# Patient Record
Sex: FEMALE | Race: WHITE | Employment: OTHER | URBAN - METROPOLITAN AREA
[De-identification: names, ages, dates, MRNs, and addresses within clinical notes are randomized per-mention and may not be internally consistent; named-entity substitution may affect disease eponyms.]

---

## 2017-05-26 ENCOUNTER — APPOINTMENT (OUTPATIENT)
Dept: GENERAL RADIOLOGY | Age: 77
DRG: 641 | End: 2017-05-26
Payer: MEDICARE

## 2017-05-26 ENCOUNTER — HOSPITAL ENCOUNTER (INPATIENT)
Age: 77
LOS: 3 days | Discharge: HOME HEALTH | DRG: 641 | End: 2017-05-29
Admitting: FAMILY MEDICINE
Payer: MEDICARE

## 2017-05-26 DIAGNOSIS — E86.0 DEHYDRATION: ICD-10-CM

## 2017-05-26 DIAGNOSIS — E87.6 HYPOKALEMIA: Primary | ICD-10-CM

## 2017-05-26 DIAGNOSIS — R53.81 OTHER MALAISE AND FATIGUE: ICD-10-CM

## 2017-05-26 DIAGNOSIS — R53.83 OTHER MALAISE AND FATIGUE: ICD-10-CM

## 2017-05-26 LAB
-: ABNORMAL
ABSOLUTE EOS #: 0 K/UL (ref 0–0.4)
ABSOLUTE LYMPH #: 0.7 K/UL (ref 1–4.8)
ABSOLUTE MONO #: 0.2 K/UL (ref 0.2–0.8)
ALBUMIN SERPL-MCNC: 4.3 G/DL (ref 3.5–5.2)
ALBUMIN/GLOBULIN RATIO: ABNORMAL (ref 1–2.5)
ALP BLD-CCNC: 86 U/L (ref 35–104)
ALT SERPL-CCNC: 31 U/L (ref 5–33)
AMORPHOUS: ABNORMAL
AMYLASE: 81 U/L (ref 28–100)
ANION GAP SERPL CALCULATED.3IONS-SCNC: 24 MMOL/L (ref 9–17)
ANION GAP SERPL CALCULATED.3IONS-SCNC: 24 MMOL/L (ref 9–17)
AST SERPL-CCNC: 33 U/L
BACTERIA: ABNORMAL
BASOPHILS # BLD: 0 %
BASOPHILS ABSOLUTE: 0 K/UL (ref 0–0.2)
BILIRUB SERPL-MCNC: 1.02 MG/DL (ref 0.3–1.2)
BILIRUBIN DIRECT: 0.26 MG/DL
BILIRUBIN URINE: ABNORMAL
BILIRUBIN, INDIRECT: 0.76 MG/DL (ref 0–1)
BUN BLDV-MCNC: 15 MG/DL (ref 8–23)
BUN BLDV-MCNC: 16 MG/DL (ref 8–23)
BUN/CREAT BLD: 21 (ref 9–20)
BUN/CREAT BLD: 23 (ref 9–20)
CALCIUM SERPL-MCNC: 9.2 MG/DL (ref 8.6–10.4)
CALCIUM SERPL-MCNC: 9.7 MG/DL (ref 8.6–10.4)
CASTS UA: ABNORMAL /LPF
CHLORIDE BLD-SCNC: 90 MMOL/L (ref 98–107)
CHLORIDE BLD-SCNC: 91 MMOL/L (ref 98–107)
CO2: 24 MMOL/L (ref 20–31)
CO2: 26 MMOL/L (ref 20–31)
COLOR: YELLOW
COMMENT UA: ABNORMAL
CREAT SERPL-MCNC: 0.66 MG/DL (ref 0.5–0.9)
CREAT SERPL-MCNC: 0.78 MG/DL (ref 0.5–0.9)
CRYSTALS, UA: ABNORMAL /HPF
DIFFERENTIAL TYPE: ABNORMAL
EOSINOPHILS RELATIVE PERCENT: 0 %
EPITHELIAL CELLS UA: ABNORMAL /HPF
GFR AFRICAN AMERICAN: >60 ML/MIN
GFR AFRICAN AMERICAN: >60 ML/MIN
GFR NON-AFRICAN AMERICAN: >60 ML/MIN
GFR NON-AFRICAN AMERICAN: >60 ML/MIN
GFR SERPL CREATININE-BSD FRML MDRD: ABNORMAL ML/MIN/{1.73_M2}
GLOBULIN: ABNORMAL G/DL (ref 1.5–3.8)
GLUCOSE BLD-MCNC: 55 MG/DL (ref 70–99)
GLUCOSE BLD-MCNC: 85 MG/DL (ref 70–99)
GLUCOSE URINE: NEGATIVE
HCT VFR BLD CALC: 53.6 % (ref 36–46)
HEMOGLOBIN: 18 G/DL (ref 12–16)
KETONES, URINE: ABNORMAL
LACTIC ACID, WHOLE BLOOD: ABNORMAL MMOL/L (ref 0.7–2.1)
LACTIC ACID: 2.9 MMOL/L (ref 0.5–2.2)
LEUKOCYTE ESTERASE, URINE: NEGATIVE
LIPASE: 50 U/L (ref 13–60)
LYMPHOCYTES # BLD: 8 %
MCH RBC QN AUTO: 29 PG (ref 26–34)
MCHC RBC AUTO-ENTMCNC: 33.6 G/DL (ref 31–37)
MCV RBC AUTO: 86.5 FL (ref 80–100)
MONOCYTES # BLD: 3 %
MUCUS: ABNORMAL
MYOGLOBIN: 180 NG/ML (ref 25–58)
NITRITE, URINE: NEGATIVE
OTHER OBSERVATIONS UA: ABNORMAL
PDW BLD-RTO: 13.6 % (ref 11.5–14.5)
PH UA: 6 (ref 5–8)
PLATELET # BLD: 148 K/UL (ref 130–400)
PLATELET ESTIMATE: ABNORMAL
PMV BLD AUTO: 9.9 FL (ref 6–12)
POTASSIUM SERPL-SCNC: 2.6 MMOL/L (ref 3.7–5.3)
POTASSIUM SERPL-SCNC: 3.1 MMOL/L (ref 3.7–5.3)
PROTEIN UA: ABNORMAL
RBC # BLD: 6.2 M/UL (ref 4–5.2)
RBC # BLD: ABNORMAL 10*6/UL
RBC UA: ABNORMAL /HPF (ref 0–2)
RENAL EPITHELIAL, UA: ABNORMAL /HPF
SEG NEUTROPHILS: 89 %
SEGMENTED NEUTROPHILS ABSOLUTE COUNT: 7.9 K/UL (ref 1.8–7.7)
SODIUM BLD-SCNC: 138 MMOL/L (ref 135–144)
SODIUM BLD-SCNC: 141 MMOL/L (ref 135–144)
SPECIFIC GRAVITY UA: 1.01 (ref 1–1.03)
TOTAL PROTEIN: 8 G/DL (ref 6.4–8.3)
TRICHOMONAS: ABNORMAL
TROPONIN INTERP: ABNORMAL
TROPONIN T: <0.03 NG/ML
TURBIDITY: CLEAR
URINE HGB: NEGATIVE
UROBILINOGEN, URINE: NORMAL
WBC # BLD: 8.9 K/UL (ref 3.5–11)
WBC # BLD: ABNORMAL 10*3/UL
WBC UA: ABNORMAL /HPF (ref 0–5)
YEAST: ABNORMAL

## 2017-05-26 PROCEDURE — 84484 ASSAY OF TROPONIN QUANT: CPT

## 2017-05-26 PROCEDURE — 71020 XR CHEST STANDARD TWO VW: CPT

## 2017-05-26 PROCEDURE — 83605 ASSAY OF LACTIC ACID: CPT

## 2017-05-26 PROCEDURE — 99285 EMERGENCY DEPT VISIT HI MDM: CPT

## 2017-05-26 PROCEDURE — 85025 COMPLETE CBC W/AUTO DIFF WBC: CPT

## 2017-05-26 PROCEDURE — 72100 X-RAY EXAM L-S SPINE 2/3 VWS: CPT

## 2017-05-26 PROCEDURE — 6360000002 HC RX W HCPCS: Performed by: NURSE PRACTITIONER

## 2017-05-26 PROCEDURE — 6370000000 HC RX 637 (ALT 250 FOR IP): Performed by: NURSE PRACTITIONER

## 2017-05-26 PROCEDURE — 82150 ASSAY OF AMYLASE: CPT

## 2017-05-26 PROCEDURE — 1200000000 HC SEMI PRIVATE

## 2017-05-26 PROCEDURE — 80048 BASIC METABOLIC PNL TOTAL CA: CPT

## 2017-05-26 PROCEDURE — 87040 BLOOD CULTURE FOR BACTERIA: CPT

## 2017-05-26 PROCEDURE — 83690 ASSAY OF LIPASE: CPT

## 2017-05-26 PROCEDURE — 72220 X-RAY EXAM SACRUM TAILBONE: CPT

## 2017-05-26 PROCEDURE — 80076 HEPATIC FUNCTION PANEL: CPT

## 2017-05-26 PROCEDURE — 6360000002 HC RX W HCPCS

## 2017-05-26 PROCEDURE — 73630 X-RAY EXAM OF FOOT: CPT

## 2017-05-26 PROCEDURE — 83874 ASSAY OF MYOGLOBIN: CPT

## 2017-05-26 PROCEDURE — 87086 URINE CULTURE/COLONY COUNT: CPT

## 2017-05-26 PROCEDURE — 90471 IMMUNIZATION ADMIN: CPT | Performed by: NURSE PRACTITIONER

## 2017-05-26 PROCEDURE — 81001 URINALYSIS AUTO W/SCOPE: CPT

## 2017-05-26 PROCEDURE — 90715 TDAP VACCINE 7 YRS/> IM: CPT | Performed by: NURSE PRACTITIONER

## 2017-05-26 PROCEDURE — 87205 SMEAR GRAM STAIN: CPT

## 2017-05-26 PROCEDURE — 93005 ELECTROCARDIOGRAM TRACING: CPT

## 2017-05-26 RX ORDER — DIPYRIDAMOLE 25 MG
25 TABLET ORAL 4 TIMES DAILY
COMMUNITY
End: 2019-02-13

## 2017-05-26 RX ORDER — ASPIRIN AND DIPYRIDAMOLE 25; 200 MG/1; MG/1
1 CAPSULE, EXTENDED RELEASE ORAL 2 TIMES DAILY
COMMUNITY
End: 2019-02-13

## 2017-05-26 RX ORDER — SODIUM CHLORIDE 0.9 % (FLUSH) 0.9 %
10 SYRINGE (ML) INJECTION PRN
Status: DISCONTINUED | OUTPATIENT
Start: 2017-05-26 | End: 2017-05-27 | Stop reason: SDUPTHER

## 2017-05-26 RX ORDER — LISINOPRIL 20 MG/1
20 TABLET ORAL DAILY
COMMUNITY

## 2017-05-26 RX ORDER — POT CHLORIDE/POT BICARB/CIT AC 25 MEQ
50 TABLET, EFFERVESCENT ORAL DAILY
Status: DISCONTINUED | OUTPATIENT
Start: 2017-05-26 | End: 2017-05-29 | Stop reason: HOSPADM

## 2017-05-26 RX ORDER — DONEPEZIL HYDROCHLORIDE 10 MG/1
10 TABLET, FILM COATED ORAL NIGHTLY
COMMUNITY

## 2017-05-26 RX ORDER — ACETAMINOPHEN 325 MG/1
650 TABLET ORAL EVERY 4 HOURS PRN
Status: DISCONTINUED | OUTPATIENT
Start: 2017-05-26 | End: 2017-05-27 | Stop reason: SDUPTHER

## 2017-05-26 RX ORDER — ATORVASTATIN CALCIUM 20 MG/1
20 TABLET, FILM COATED ORAL DAILY
COMMUNITY

## 2017-05-26 RX ORDER — GLIPIZIDE 5 MG/1
5-10 TABLET ORAL SEE ADMIN INSTRUCTIONS
COMMUNITY

## 2017-05-26 RX ORDER — SODIUM CHLORIDE 0.9 % (FLUSH) 0.9 %
10 SYRINGE (ML) INJECTION EVERY 12 HOURS SCHEDULED
Status: DISCONTINUED | OUTPATIENT
Start: 2017-05-27 | End: 2017-05-27 | Stop reason: SDUPTHER

## 2017-05-26 RX ORDER — POTASSIUM CHLORIDE 7.45 MG/ML
10 INJECTION INTRAVENOUS PRN
Status: DISCONTINUED | OUTPATIENT
Start: 2017-05-26 | End: 2017-05-27 | Stop reason: SDUPTHER

## 2017-05-26 RX ADMIN — Medication 50 MEQ: at 20:41

## 2017-05-26 RX ADMIN — TETANUS TOXOID, REDUCED DIPHTHERIA TOXOID AND ACELLULAR PERTUSSIS VACCINE, ADSORBED 0.5 ML: 5; 2.5; 8; 8; 2.5 SUSPENSION INTRAMUSCULAR at 19:06

## 2017-05-26 RX ADMIN — POTASSIUM CHLORIDE 10 MEQ: 7.46 INJECTION, SOLUTION INTRAVENOUS at 22:33

## 2017-05-26 ASSESSMENT — PAIN DESCRIPTION - ONSET: ONSET: ON-GOING

## 2017-05-26 ASSESSMENT — PAIN DESCRIPTION - LOCATION: LOCATION: FOOT

## 2017-05-26 ASSESSMENT — ENCOUNTER SYMPTOMS
ABDOMINAL PAIN: 0
VOMITING: 0
BACK PAIN: 1
SHORTNESS OF BREATH: 0
NAUSEA: 0

## 2017-05-26 ASSESSMENT — PAIN SCALES - GENERAL: PAINLEVEL_OUTOF10: 9

## 2017-05-26 ASSESSMENT — PAIN DESCRIPTION - PROGRESSION: CLINICAL_PROGRESSION: NOT CHANGED

## 2017-05-26 ASSESSMENT — PAIN DESCRIPTION - PAIN TYPE: TYPE: ACUTE PAIN

## 2017-05-26 ASSESSMENT — PAIN DESCRIPTION - ORIENTATION: ORIENTATION: LEFT

## 2017-05-26 ASSESSMENT — PAIN DESCRIPTION - FREQUENCY: FREQUENCY: CONTINUOUS

## 2017-05-27 PROBLEM — F02.80 ALZHEIMER'S DISEASE (HCC): Status: ACTIVE | Noted: 2017-05-27

## 2017-05-27 PROBLEM — E87.6 HYPOKALEMIA: Status: ACTIVE | Noted: 2017-05-27

## 2017-05-27 PROBLEM — E16.2 HYPOGLYCEMIA: Status: ACTIVE | Noted: 2017-05-27

## 2017-05-27 PROBLEM — G30.9 ALZHEIMER'S DISEASE (HCC): Status: ACTIVE | Noted: 2017-05-27

## 2017-05-27 LAB
ABSOLUTE EOS #: 0 K/UL (ref 0–0.4)
ABSOLUTE LYMPH #: 2 K/UL (ref 1–4.8)
ABSOLUTE MONO #: 0.5 K/UL (ref 0.2–0.8)
ANION GAP SERPL CALCULATED.3IONS-SCNC: 16 MMOL/L (ref 9–17)
ANION GAP SERPL CALCULATED.3IONS-SCNC: 20 MMOL/L (ref 9–17)
BASOPHILS # BLD: 0 %
BASOPHILS ABSOLUTE: 0 K/UL (ref 0–0.2)
BUN BLDV-MCNC: 11 MG/DL (ref 8–23)
BUN BLDV-MCNC: 13 MG/DL (ref 8–23)
BUN/CREAT BLD: 20 (ref 9–20)
BUN/CREAT BLD: 23 (ref 9–20)
CALCIUM SERPL-MCNC: 8.8 MG/DL (ref 8.6–10.4)
CALCIUM SERPL-MCNC: 8.9 MG/DL (ref 8.6–10.4)
CHLORIDE BLD-SCNC: 92 MMOL/L (ref 98–107)
CHLORIDE BLD-SCNC: 98 MMOL/L (ref 98–107)
CO2: 26 MMOL/L (ref 20–31)
CO2: 26 MMOL/L (ref 20–31)
CREAT SERPL-MCNC: 0.56 MG/DL (ref 0.5–0.9)
CREAT SERPL-MCNC: 0.56 MG/DL (ref 0.5–0.9)
CULTURE: NO GROWTH
CULTURE: NORMAL
DIFFERENTIAL TYPE: ABNORMAL
EKG ATRIAL RATE: 95 BPM
EKG P AXIS: 8 DEGREES
EKG P-R INTERVAL: 168 MS
EKG Q-T INTERVAL: 452 MS
EKG QRS DURATION: 86 MS
EKG QTC CALCULATION (BAZETT): 568 MS
EKG R AXIS: -27 DEGREES
EKG T AXIS: 57 DEGREES
EKG VENTRICULAR RATE: 95 BPM
EOSINOPHILS RELATIVE PERCENT: 1 %
GFR AFRICAN AMERICAN: >60 ML/MIN
GFR AFRICAN AMERICAN: >60 ML/MIN
GFR NON-AFRICAN AMERICAN: >60 ML/MIN
GFR NON-AFRICAN AMERICAN: >60 ML/MIN
GFR SERPL CREATININE-BSD FRML MDRD: ABNORMAL ML/MIN/{1.73_M2}
GLUCOSE BLD-MCNC: 108 MG/DL (ref 70–99)
GLUCOSE BLD-MCNC: 123 MG/DL (ref 65–105)
GLUCOSE BLD-MCNC: 168 MG/DL (ref 70–99)
GLUCOSE BLD-MCNC: 182 MG/DL (ref 65–105)
GLUCOSE BLD-MCNC: 202 MG/DL (ref 65–105)
GLUCOSE BLD-MCNC: 252 MG/DL (ref 65–105)
GLUCOSE BLD-MCNC: 275 MG/DL (ref 65–105)
GLUCOSE BLD-MCNC: 33 MG/DL (ref 65–105)
HCT VFR BLD CALC: 47.5 % (ref 36–46)
HEMOGLOBIN: 16 G/DL (ref 12–16)
LYMPHOCYTES # BLD: 24 %
Lab: NORMAL
MCH RBC QN AUTO: 29.3 PG (ref 26–34)
MCHC RBC AUTO-ENTMCNC: 33.6 G/DL (ref 31–37)
MCV RBC AUTO: 87.1 FL (ref 80–100)
MONOCYTES # BLD: 6 %
PDW BLD-RTO: 13.4 % (ref 11.5–14.5)
PLATELET # BLD: 140 K/UL (ref 130–400)
PLATELET ESTIMATE: ABNORMAL
PMV BLD AUTO: 10.4 FL (ref 6–12)
POTASSIUM SERPL-SCNC: 2.8 MMOL/L (ref 3.7–5.3)
POTASSIUM SERPL-SCNC: 3.2 MMOL/L (ref 3.7–5.3)
POTASSIUM SERPL-SCNC: 3.7 MMOL/L (ref 3.7–5.3)
RBC # BLD: 5.46 M/UL (ref 4–5.2)
RBC # BLD: ABNORMAL 10*6/UL
SEG NEUTROPHILS: 69 %
SEGMENTED NEUTROPHILS ABSOLUTE COUNT: 5.5 K/UL (ref 1.8–7.7)
SODIUM BLD-SCNC: 138 MMOL/L (ref 135–144)
SODIUM BLD-SCNC: 140 MMOL/L (ref 135–144)
SPECIMEN DESCRIPTION: NORMAL
SPECIMEN DESCRIPTION: NORMAL
STATUS: NORMAL
WBC # BLD: 8 K/UL (ref 3.5–11)
WBC # BLD: ABNORMAL 10*3/UL

## 2017-05-27 PROCEDURE — 6370000000 HC RX 637 (ALT 250 FOR IP): Performed by: NURSE PRACTITIONER

## 2017-05-27 PROCEDURE — 94760 N-INVAS EAR/PLS OXIMETRY 1: CPT

## 2017-05-27 PROCEDURE — 80048 BASIC METABOLIC PNL TOTAL CA: CPT

## 2017-05-27 PROCEDURE — G8987 SELF CARE CURRENT STATUS: HCPCS

## 2017-05-27 PROCEDURE — 36415 COLL VENOUS BLD VENIPUNCTURE: CPT

## 2017-05-27 PROCEDURE — G8988 SELF CARE GOAL STATUS: HCPCS

## 2017-05-27 PROCEDURE — 1200000000 HC SEMI PRIVATE

## 2017-05-27 PROCEDURE — 85025 COMPLETE CBC W/AUTO DIFF WBC: CPT

## 2017-05-27 PROCEDURE — 97166 OT EVAL MOD COMPLEX 45 MIN: CPT

## 2017-05-27 PROCEDURE — 97116 GAIT TRAINING THERAPY: CPT

## 2017-05-27 PROCEDURE — 94150 VITAL CAPACITY TEST: CPT

## 2017-05-27 PROCEDURE — 97535 SELF CARE MNGMENT TRAINING: CPT

## 2017-05-27 PROCEDURE — 2580000003 HC RX 258: Performed by: FAMILY MEDICINE

## 2017-05-27 PROCEDURE — 82947 ASSAY GLUCOSE BLOOD QUANT: CPT

## 2017-05-27 PROCEDURE — 6360000002 HC RX W HCPCS: Performed by: FAMILY MEDICINE

## 2017-05-27 PROCEDURE — 84132 ASSAY OF SERUM POTASSIUM: CPT

## 2017-05-27 PROCEDURE — 97530 THERAPEUTIC ACTIVITIES: CPT

## 2017-05-27 PROCEDURE — 97162 PT EVAL MOD COMPLEX 30 MIN: CPT

## 2017-05-27 PROCEDURE — G8978 MOBILITY CURRENT STATUS: HCPCS

## 2017-05-27 PROCEDURE — 83036 HEMOGLOBIN GLYCOSYLATED A1C: CPT

## 2017-05-27 PROCEDURE — G8979 MOBILITY GOAL STATUS: HCPCS

## 2017-05-27 PROCEDURE — 6370000000 HC RX 637 (ALT 250 FOR IP): Performed by: FAMILY MEDICINE

## 2017-05-27 RX ORDER — DEXTROSE AND SODIUM CHLORIDE 5; .45 G/100ML; G/100ML
INJECTION, SOLUTION INTRAVENOUS CONTINUOUS
Status: DISCONTINUED | OUTPATIENT
Start: 2017-05-27 | End: 2017-05-27

## 2017-05-27 RX ORDER — ATORVASTATIN CALCIUM 20 MG/1
20 TABLET, FILM COATED ORAL DAILY
Status: DISCONTINUED | OUTPATIENT
Start: 2017-05-27 | End: 2017-05-29 | Stop reason: HOSPADM

## 2017-05-27 RX ORDER — DONEPEZIL HYDROCHLORIDE 10 MG/1
10 TABLET, FILM COATED ORAL NIGHTLY
Status: DISCONTINUED | OUTPATIENT
Start: 2017-05-27 | End: 2017-05-29 | Stop reason: HOSPADM

## 2017-05-27 RX ORDER — ACETAMINOPHEN 325 MG/1
650 TABLET ORAL EVERY 4 HOURS PRN
Status: DISCONTINUED | OUTPATIENT
Start: 2017-05-27 | End: 2017-05-29 | Stop reason: HOSPADM

## 2017-05-27 RX ORDER — SODIUM CHLORIDE 0.9 % (FLUSH) 0.9 %
10 SYRINGE (ML) INJECTION EVERY 12 HOURS SCHEDULED
Status: DISCONTINUED | OUTPATIENT
Start: 2017-05-27 | End: 2017-05-29 | Stop reason: HOSPADM

## 2017-05-27 RX ORDER — POTASSIUM CHLORIDE 7.45 MG/ML
10 INJECTION INTRAVENOUS PRN
Status: DISCONTINUED | OUTPATIENT
Start: 2017-05-27 | End: 2017-05-27 | Stop reason: ALTCHOICE

## 2017-05-27 RX ORDER — POTASSIUM CHLORIDE 20MEQ/15ML
40 LIQUID (ML) ORAL PRN
Status: DISCONTINUED | OUTPATIENT
Start: 2017-05-27 | End: 2017-05-27 | Stop reason: ALTCHOICE

## 2017-05-27 RX ORDER — DEXTROSE MONOHYDRATE 25 G/50ML
12.5 INJECTION, SOLUTION INTRAVENOUS PRN
Status: DISCONTINUED | OUTPATIENT
Start: 2017-05-27 | End: 2017-05-27 | Stop reason: SDUPTHER

## 2017-05-27 RX ORDER — POTASSIUM CHLORIDE 29.8 MG/ML
20 INJECTION INTRAVENOUS PRN
Status: DISCONTINUED | OUTPATIENT
Start: 2017-05-27 | End: 2017-05-27 | Stop reason: ALTCHOICE

## 2017-05-27 RX ORDER — NICOTINE POLACRILEX 4 MG
15 LOZENGE BUCCAL PRN
Status: DISCONTINUED | OUTPATIENT
Start: 2017-05-27 | End: 2017-05-29 | Stop reason: HOSPADM

## 2017-05-27 RX ORDER — GLIPIZIDE 5 MG/1
5 TABLET ORAL
Status: DISCONTINUED | OUTPATIENT
Start: 2017-05-27 | End: 2017-05-29 | Stop reason: HOSPADM

## 2017-05-27 RX ORDER — DIPYRIDAMOLE 25 MG
25 TABLET ORAL 4 TIMES DAILY
Status: DISCONTINUED | OUTPATIENT
Start: 2017-05-27 | End: 2017-05-29 | Stop reason: HOSPADM

## 2017-05-27 RX ORDER — LISINOPRIL 20 MG/1
20 TABLET ORAL DAILY
Status: DISCONTINUED | OUTPATIENT
Start: 2017-05-27 | End: 2017-05-29 | Stop reason: HOSPADM

## 2017-05-27 RX ORDER — POTASSIUM CHLORIDE 7.45 MG/ML
10 INJECTION INTRAVENOUS PRN
Status: DISCONTINUED | OUTPATIENT
Start: 2017-05-27 | End: 2017-05-29 | Stop reason: HOSPADM

## 2017-05-27 RX ORDER — ASPIRIN AND DIPYRIDAMOLE 25; 200 MG/1; MG/1
1 CAPSULE, EXTENDED RELEASE ORAL 2 TIMES DAILY
Status: DISCONTINUED | OUTPATIENT
Start: 2017-05-27 | End: 2017-05-29 | Stop reason: HOSPADM

## 2017-05-27 RX ORDER — DEXTROSE MONOHYDRATE 50 MG/ML
100 INJECTION, SOLUTION INTRAVENOUS PRN
Status: DISCONTINUED | OUTPATIENT
Start: 2017-05-27 | End: 2017-05-27 | Stop reason: SDUPTHER

## 2017-05-27 RX ORDER — DEXTROSE MONOHYDRATE 25 G/50ML
12.5 INJECTION, SOLUTION INTRAVENOUS PRN
Status: DISCONTINUED | OUTPATIENT
Start: 2017-05-27 | End: 2017-05-29 | Stop reason: HOSPADM

## 2017-05-27 RX ORDER — DEXTROSE MONOHYDRATE 50 MG/ML
100 INJECTION, SOLUTION INTRAVENOUS PRN
Status: DISCONTINUED | OUTPATIENT
Start: 2017-05-27 | End: 2017-05-29 | Stop reason: HOSPADM

## 2017-05-27 RX ORDER — ONDANSETRON 2 MG/ML
4 INJECTION INTRAMUSCULAR; INTRAVENOUS EVERY 6 HOURS PRN
Status: DISCONTINUED | OUTPATIENT
Start: 2017-05-27 | End: 2017-05-29 | Stop reason: HOSPADM

## 2017-05-27 RX ORDER — SODIUM CHLORIDE 0.9 % (FLUSH) 0.9 %
10 SYRINGE (ML) INJECTION PRN
Status: DISCONTINUED | OUTPATIENT
Start: 2017-05-27 | End: 2017-05-29 | Stop reason: HOSPADM

## 2017-05-27 RX ORDER — NICOTINE POLACRILEX 4 MG
15 LOZENGE BUCCAL PRN
Status: DISCONTINUED | OUTPATIENT
Start: 2017-05-27 | End: 2017-05-27 | Stop reason: SDUPTHER

## 2017-05-27 RX ORDER — POTASSIUM CHLORIDE 20 MEQ/1
40 TABLET, EXTENDED RELEASE ORAL PRN
Status: DISCONTINUED | OUTPATIENT
Start: 2017-05-27 | End: 2017-05-27 | Stop reason: ALTCHOICE

## 2017-05-27 RX ORDER — ALBUTEROL SULFATE 2.5 MG/3ML
2.5 SOLUTION RESPIRATORY (INHALATION)
Status: DISCONTINUED | OUTPATIENT
Start: 2017-05-27 | End: 2017-05-29 | Stop reason: HOSPADM

## 2017-05-27 RX ADMIN — DIPYRIDAMOLE 25 MG: 25 TABLET ORAL at 21:15

## 2017-05-27 RX ADMIN — ATORVASTATIN CALCIUM 20 MG: 20 TABLET, FILM COATED ORAL at 08:45

## 2017-05-27 RX ADMIN — Medication 10 ML: at 21:15

## 2017-05-27 RX ADMIN — METFORMIN HYDROCHLORIDE 1000 MG: 500 TABLET, FILM COATED ORAL at 17:35

## 2017-05-27 RX ADMIN — POTASSIUM CHLORIDE 10 MEQ: 7.46 INJECTION, SOLUTION INTRAVENOUS at 07:51

## 2017-05-27 RX ADMIN — POTASSIUM CHLORIDE 10 MEQ: 7.46 INJECTION, SOLUTION INTRAVENOUS at 05:23

## 2017-05-27 RX ADMIN — POTASSIUM CHLORIDE 10 MEQ: 7.46 INJECTION, SOLUTION INTRAVENOUS at 03:27

## 2017-05-27 RX ADMIN — ASPIRIN AND DIPYRIDAMOLE 1 CAPSULE: 25; 200 CAPSULE ORAL at 02:30

## 2017-05-27 RX ADMIN — ASPIRIN AND DIPYRIDAMOLE 1 CAPSULE: 25; 200 CAPSULE ORAL at 08:44

## 2017-05-27 RX ADMIN — Medication 50 MEQ: at 09:09

## 2017-05-27 RX ADMIN — DONEPEZIL HYDROCHLORIDE 10 MG: 10 TABLET, FILM COATED ORAL at 02:30

## 2017-05-27 RX ADMIN — DIPYRIDAMOLE 25 MG: 25 TABLET ORAL at 08:46

## 2017-05-27 RX ADMIN — LISINOPRIL 20 MG: 20 TABLET ORAL at 08:45

## 2017-05-27 RX ADMIN — INSULIN LISPRO 1 UNITS: 100 INJECTION, SOLUTION INTRAVENOUS; SUBCUTANEOUS at 21:27

## 2017-05-27 RX ADMIN — DONEPEZIL HYDROCHLORIDE 10 MG: 10 TABLET, FILM COATED ORAL at 21:15

## 2017-05-27 RX ADMIN — DIPYRIDAMOLE 25 MG: 25 TABLET ORAL at 12:59

## 2017-05-27 RX ADMIN — GLIPIZIDE 5 MG: 5 TABLET ORAL at 08:44

## 2017-05-27 RX ADMIN — GLIPIZIDE 5 MG: 5 TABLET ORAL at 17:36

## 2017-05-27 RX ADMIN — DEXTROSE AND SODIUM CHLORIDE: 5; 450 INJECTION, SOLUTION INTRAVENOUS at 01:22

## 2017-05-27 RX ADMIN — METFORMIN HYDROCHLORIDE 1000 MG: 500 TABLET, FILM COATED ORAL at 08:44

## 2017-05-27 RX ADMIN — ENOXAPARIN SODIUM 40 MG: 40 INJECTION SUBCUTANEOUS at 08:45

## 2017-05-27 RX ADMIN — POTASSIUM CHLORIDE 10 MEQ: 7.46 INJECTION, SOLUTION INTRAVENOUS at 08:51

## 2017-05-27 RX ADMIN — ASPIRIN AND DIPYRIDAMOLE 1 CAPSULE: 25; 200 CAPSULE ORAL at 21:15

## 2017-05-27 RX ADMIN — DIPYRIDAMOLE 25 MG: 25 TABLET ORAL at 17:35

## 2017-05-27 RX ADMIN — POTASSIUM CHLORIDE 10 MEQ: 7.46 INJECTION, SOLUTION INTRAVENOUS at 06:31

## 2017-05-27 RX ADMIN — POTASSIUM CHLORIDE 10 MEQ: 7.46 INJECTION, SOLUTION INTRAVENOUS at 04:23

## 2017-05-27 ASSESSMENT — PAIN SCALES - GENERAL: PAINLEVEL_OUTOF10: 0

## 2017-05-28 LAB
ALBUMIN SERPL-MCNC: 3.4 G/DL (ref 3.5–5.2)
ANION GAP SERPL CALCULATED.3IONS-SCNC: 16 MMOL/L (ref 9–17)
BUN BLDV-MCNC: 8 MG/DL (ref 8–23)
BUN/CREAT BLD: 15 (ref 9–20)
CALCIUM SERPL-MCNC: 8.8 MG/DL (ref 8.6–10.4)
CHLORIDE BLD-SCNC: 101 MMOL/L (ref 98–107)
CO2: 24 MMOL/L (ref 20–31)
CREAT SERPL-MCNC: 0.52 MG/DL (ref 0.5–0.9)
GFR AFRICAN AMERICAN: >60 ML/MIN
GFR NON-AFRICAN AMERICAN: >60 ML/MIN
GFR SERPL CREATININE-BSD FRML MDRD: ABNORMAL ML/MIN/{1.73_M2}
GFR SERPL CREATININE-BSD FRML MDRD: ABNORMAL ML/MIN/{1.73_M2}
GLUCOSE BLD-MCNC: 107 MG/DL (ref 65–105)
GLUCOSE BLD-MCNC: 151 MG/DL (ref 70–99)
GLUCOSE BLD-MCNC: 166 MG/DL (ref 65–105)
GLUCOSE BLD-MCNC: 185 MG/DL (ref 65–105)
GLUCOSE BLD-MCNC: 232 MG/DL (ref 65–105)
GLUCOSE BLD-MCNC: 259 MG/DL (ref 65–105)
POTASSIUM SERPL-SCNC: 3.1 MMOL/L (ref 3.7–5.3)
PREALBUMIN: 12.3 MG/DL (ref 20–40)
SODIUM BLD-SCNC: 141 MMOL/L (ref 135–144)
T3 FREE: 2.94 PG/ML (ref 2.02–4.43)
THYROXINE, FREE: 1.52 NG/DL (ref 0.93–1.7)
TSH SERPL DL<=0.05 MIU/L-ACNC: 2.73 MIU/L (ref 0.3–5)

## 2017-05-28 PROCEDURE — 84443 ASSAY THYROID STIM HORMONE: CPT

## 2017-05-28 PROCEDURE — 93005 ELECTROCARDIOGRAM TRACING: CPT

## 2017-05-28 PROCEDURE — 6370000000 HC RX 637 (ALT 250 FOR IP): Performed by: FAMILY MEDICINE

## 2017-05-28 PROCEDURE — 1200000000 HC SEMI PRIVATE

## 2017-05-28 PROCEDURE — 84439 ASSAY OF FREE THYROXINE: CPT

## 2017-05-28 PROCEDURE — 82040 ASSAY OF SERUM ALBUMIN: CPT

## 2017-05-28 PROCEDURE — 84481 FREE ASSAY (FT-3): CPT

## 2017-05-28 PROCEDURE — 83036 HEMOGLOBIN GLYCOSYLATED A1C: CPT

## 2017-05-28 PROCEDURE — 2580000003 HC RX 258: Performed by: FAMILY MEDICINE

## 2017-05-28 PROCEDURE — 80048 BASIC METABOLIC PNL TOTAL CA: CPT

## 2017-05-28 PROCEDURE — 6370000000 HC RX 637 (ALT 250 FOR IP): Performed by: NURSE PRACTITIONER

## 2017-05-28 PROCEDURE — 36415 COLL VENOUS BLD VENIPUNCTURE: CPT

## 2017-05-28 PROCEDURE — 6360000002 HC RX W HCPCS: Performed by: FAMILY MEDICINE

## 2017-05-28 PROCEDURE — 82947 ASSAY GLUCOSE BLOOD QUANT: CPT

## 2017-05-28 PROCEDURE — 84134 ASSAY OF PREALBUMIN: CPT

## 2017-05-28 RX ORDER — POTASSIUM CHLORIDE 20 MEQ/1
40 TABLET, EXTENDED RELEASE ORAL PRN
Status: DISCONTINUED | OUTPATIENT
Start: 2017-05-28 | End: 2017-05-29 | Stop reason: HOSPADM

## 2017-05-28 RX ORDER — POTASSIUM CHLORIDE 7.45 MG/ML
10 INJECTION INTRAVENOUS PRN
Status: DISCONTINUED | OUTPATIENT
Start: 2017-05-28 | End: 2017-05-29 | Stop reason: HOSPADM

## 2017-05-28 RX ORDER — POTASSIUM CHLORIDE 20MEQ/15ML
40 LIQUID (ML) ORAL PRN
Status: DISCONTINUED | OUTPATIENT
Start: 2017-05-28 | End: 2017-05-29 | Stop reason: HOSPADM

## 2017-05-28 RX ADMIN — DIPYRIDAMOLE 25 MG: 25 TABLET ORAL at 08:40

## 2017-05-28 RX ADMIN — POTASSIUM CHLORIDE 40 MEQ: 20 TABLET, EXTENDED RELEASE ORAL at 11:25

## 2017-05-28 RX ADMIN — Medication 10 ML: at 22:22

## 2017-05-28 RX ADMIN — ASPIRIN AND DIPYRIDAMOLE 1 CAPSULE: 25; 200 CAPSULE ORAL at 22:21

## 2017-05-28 RX ADMIN — DIPYRIDAMOLE 25 MG: 25 TABLET ORAL at 13:00

## 2017-05-28 RX ADMIN — METFORMIN HYDROCHLORIDE 1000 MG: 500 TABLET, FILM COATED ORAL at 08:41

## 2017-05-28 RX ADMIN — DONEPEZIL HYDROCHLORIDE 10 MG: 10 TABLET, FILM COATED ORAL at 22:21

## 2017-05-28 RX ADMIN — DIPYRIDAMOLE 25 MG: 25 TABLET ORAL at 16:20

## 2017-05-28 RX ADMIN — ATORVASTATIN CALCIUM 20 MG: 20 TABLET, FILM COATED ORAL at 08:41

## 2017-05-28 RX ADMIN — GLIPIZIDE 5 MG: 5 TABLET ORAL at 16:20

## 2017-05-28 RX ADMIN — ASPIRIN AND DIPYRIDAMOLE 1 CAPSULE: 25; 200 CAPSULE ORAL at 08:40

## 2017-05-28 RX ADMIN — INSULIN LISPRO 3 UNITS: 100 INJECTION, SOLUTION INTRAVENOUS; SUBCUTANEOUS at 16:21

## 2017-05-28 RX ADMIN — GLIPIZIDE 5 MG: 5 TABLET ORAL at 08:41

## 2017-05-28 RX ADMIN — INSULIN LISPRO 1 UNITS: 100 INJECTION, SOLUTION INTRAVENOUS; SUBCUTANEOUS at 08:52

## 2017-05-28 RX ADMIN — Medication 50 MEQ: at 08:42

## 2017-05-28 RX ADMIN — ENOXAPARIN SODIUM 40 MG: 40 INJECTION SUBCUTANEOUS at 08:40

## 2017-05-28 RX ADMIN — INSULIN LISPRO 1 UNITS: 100 INJECTION, SOLUTION INTRAVENOUS; SUBCUTANEOUS at 13:01

## 2017-05-28 RX ADMIN — METFORMIN HYDROCHLORIDE 1000 MG: 500 TABLET, FILM COATED ORAL at 16:20

## 2017-05-28 RX ADMIN — Medication 10 ML: at 10:49

## 2017-05-28 RX ADMIN — LISINOPRIL 20 MG: 20 TABLET ORAL at 08:40

## 2017-05-28 RX ADMIN — DIPYRIDAMOLE 25 MG: 25 TABLET ORAL at 22:21

## 2017-05-28 ASSESSMENT — PAIN SCALES - GENERAL
PAINLEVEL_OUTOF10: 0

## 2017-05-29 VITALS
HEART RATE: 85 BPM | BODY MASS INDEX: 21.49 KG/M2 | OXYGEN SATURATION: 95 % | HEIGHT: 67 IN | DIASTOLIC BLOOD PRESSURE: 82 MMHG | TEMPERATURE: 98.5 F | RESPIRATION RATE: 16 BRPM | SYSTOLIC BLOOD PRESSURE: 144 MMHG | WEIGHT: 136.9 LBS

## 2017-05-29 LAB
ANION GAP SERPL CALCULATED.3IONS-SCNC: 15 MMOL/L (ref 9–17)
BUN BLDV-MCNC: 11 MG/DL (ref 8–23)
BUN/CREAT BLD: 22 (ref 9–20)
CALCIUM SERPL-MCNC: 8.6 MG/DL (ref 8.6–10.4)
CHLORIDE BLD-SCNC: 102 MMOL/L (ref 98–107)
CO2: 21 MMOL/L (ref 20–31)
CREAT SERPL-MCNC: 0.51 MG/DL (ref 0.5–0.9)
ESTIMATED AVERAGE GLUCOSE: 240 MG/DL
ESTIMATED AVERAGE GLUCOSE: 243 MG/DL
GFR AFRICAN AMERICAN: >60 ML/MIN
GFR NON-AFRICAN AMERICAN: >60 ML/MIN
GFR SERPL CREATININE-BSD FRML MDRD: ABNORMAL ML/MIN/{1.73_M2}
GFR SERPL CREATININE-BSD FRML MDRD: ABNORMAL ML/MIN/{1.73_M2}
GLUCOSE BLD-MCNC: 159 MG/DL (ref 70–99)
GLUCOSE BLD-MCNC: 161 MG/DL (ref 65–105)
GLUCOSE BLD-MCNC: 177 MG/DL (ref 65–105)
HBA1C MFR BLD: 10 % (ref 4–6)
HBA1C MFR BLD: 10.1 % (ref 4–6)
POTASSIUM SERPL-SCNC: 4.8 MMOL/L (ref 3.7–5.3)
SODIUM BLD-SCNC: 138 MMOL/L (ref 135–144)

## 2017-05-29 PROCEDURE — 2580000003 HC RX 258: Performed by: FAMILY MEDICINE

## 2017-05-29 PROCEDURE — 82947 ASSAY GLUCOSE BLOOD QUANT: CPT

## 2017-05-29 PROCEDURE — 6370000000 HC RX 637 (ALT 250 FOR IP): Performed by: FAMILY MEDICINE

## 2017-05-29 PROCEDURE — 80048 BASIC METABOLIC PNL TOTAL CA: CPT

## 2017-05-29 RX ORDER — POTASSIUM CHLORIDE 20 MEQ/1
20 TABLET, EXTENDED RELEASE ORAL
Status: DISCONTINUED | OUTPATIENT
Start: 2017-05-30 | End: 2017-05-29 | Stop reason: HOSPADM

## 2017-05-29 RX ORDER — POTASSIUM CHLORIDE 20 MEQ/1
20 TABLET, EXTENDED RELEASE ORAL 2 TIMES DAILY WITH MEALS
Status: DISCONTINUED | OUTPATIENT
Start: 2017-05-29 | End: 2017-05-29

## 2017-05-29 RX ORDER — POTASSIUM CHLORIDE 20 MEQ/1
20 TABLET, EXTENDED RELEASE ORAL
Qty: 60 TABLET | Refills: 3 | Status: SHIPPED | OUTPATIENT
Start: 2017-05-30

## 2017-05-29 RX ADMIN — METFORMIN HYDROCHLORIDE 1000 MG: 500 TABLET, FILM COATED ORAL at 07:55

## 2017-05-29 RX ADMIN — DIPYRIDAMOLE 25 MG: 25 TABLET ORAL at 07:55

## 2017-05-29 RX ADMIN — INSULIN LISPRO 1 UNITS: 100 INJECTION, SOLUTION INTRAVENOUS; SUBCUTANEOUS at 12:08

## 2017-05-29 RX ADMIN — ATORVASTATIN CALCIUM 20 MG: 20 TABLET, FILM COATED ORAL at 07:55

## 2017-05-29 RX ADMIN — Medication 10 ML: at 07:56

## 2017-05-29 RX ADMIN — GLIPIZIDE 5 MG: 5 TABLET ORAL at 07:55

## 2017-05-29 RX ADMIN — ASPIRIN AND DIPYRIDAMOLE 1 CAPSULE: 25; 200 CAPSULE ORAL at 07:55

## 2017-05-29 RX ADMIN — DIPYRIDAMOLE 25 MG: 25 TABLET ORAL at 12:12

## 2017-05-29 RX ADMIN — LISINOPRIL 20 MG: 20 TABLET ORAL at 07:55

## 2017-05-29 RX ADMIN — INSULIN LISPRO 1 UNITS: 100 INJECTION, SOLUTION INTRAVENOUS; SUBCUTANEOUS at 08:01

## 2017-05-29 ASSESSMENT — PAIN SCALES - GENERAL: PAINLEVEL_OUTOF10: 0

## 2017-05-30 LAB
EKG ATRIAL RATE: 85 BPM
EKG P AXIS: 14 DEGREES
EKG P-R INTERVAL: 178 MS
EKG Q-T INTERVAL: 382 MS
EKG QRS DURATION: 86 MS
EKG QTC CALCULATION (BAZETT): 454 MS
EKG R AXIS: -31 DEGREES
EKG T AXIS: 69 DEGREES
EKG VENTRICULAR RATE: 85 BPM

## 2017-06-01 LAB
CULTURE: ABNORMAL
CULTURE: NORMAL
CULTURE: NORMAL
Lab: ABNORMAL
Lab: NORMAL
SPECIMEN DESCRIPTION: ABNORMAL
SPECIMEN DESCRIPTION: ABNORMAL
SPECIMEN DESCRIPTION: NORMAL
SPECIMEN DESCRIPTION: NORMAL
STATUS: ABNORMAL
STATUS: NORMAL

## 2019-02-13 ENCOUNTER — HOSPITAL ENCOUNTER (EMERGENCY)
Age: 79
Discharge: HOME | End: 2019-02-13
Attending: EMERGENCY MEDICINE
Payer: MEDICARE

## 2019-02-13 ENCOUNTER — APPOINTMENT (OUTPATIENT)
Dept: CT IMAGING | Age: 79
End: 2019-02-13
Payer: MEDICARE

## 2019-02-13 ENCOUNTER — APPOINTMENT (OUTPATIENT)
Dept: GENERAL RADIOLOGY | Age: 79
End: 2019-02-13
Payer: MEDICARE

## 2019-02-13 VITALS
OXYGEN SATURATION: 98 % | RESPIRATION RATE: 15 BRPM | SYSTOLIC BLOOD PRESSURE: 150 MMHG | WEIGHT: 130 LBS | HEART RATE: 75 BPM | DIASTOLIC BLOOD PRESSURE: 72 MMHG | BODY MASS INDEX: 21.66 KG/M2 | HEIGHT: 65 IN | TEMPERATURE: 97.5 F

## 2019-02-13 DIAGNOSIS — R63.0 LACK OF APPETITE: ICD-10-CM

## 2019-02-13 DIAGNOSIS — R73.9 HYPERGLYCEMIA: Primary | ICD-10-CM

## 2019-02-13 LAB
-: ABNORMAL
ABSOLUTE EOS #: 0.1 K/UL (ref 0–0.4)
ABSOLUTE IMMATURE GRANULOCYTE: ABNORMAL K/UL (ref 0–0.3)
ABSOLUTE LYMPH #: 0.8 K/UL (ref 1–4.8)
ABSOLUTE MONO #: 0.3 K/UL (ref 0.2–0.8)
ALBUMIN SERPL-MCNC: 4.1 G/DL (ref 3.5–5.2)
ALBUMIN/GLOBULIN RATIO: ABNORMAL (ref 1–2.5)
ALP BLD-CCNC: 93 U/L (ref 35–104)
ALT SERPL-CCNC: 19 U/L (ref 5–33)
AMORPHOUS: ABNORMAL
ANION GAP SERPL CALCULATED.3IONS-SCNC: 20 MMOL/L (ref 9–17)
AST SERPL-CCNC: 20 U/L
BACTERIA: ABNORMAL
BASOPHILS # BLD: 0 % (ref 0–2)
BASOPHILS ABSOLUTE: 0 K/UL (ref 0–0.2)
BILIRUB SERPL-MCNC: 0.57 MG/DL (ref 0.3–1.2)
BILIRUBIN URINE: NEGATIVE
BUN BLDV-MCNC: 10 MG/DL (ref 8–23)
BUN/CREAT BLD: 14 (ref 9–20)
CALCIUM SERPL-MCNC: 9.9 MG/DL (ref 8.6–10.4)
CASTS UA: ABNORMAL /LPF
CHLORIDE BLD-SCNC: 100 MMOL/L (ref 98–107)
CO2: 22 MMOL/L (ref 20–31)
COLOR: YELLOW
COMMENT UA: ABNORMAL
CREAT SERPL-MCNC: 0.7 MG/DL (ref 0.5–0.9)
CRYSTALS, UA: ABNORMAL /HPF
DIFFERENTIAL TYPE: ABNORMAL
EOSINOPHILS RELATIVE PERCENT: 1 % (ref 1–4)
EPITHELIAL CELLS UA: ABNORMAL /HPF (ref 0–5)
GFR AFRICAN AMERICAN: >60 ML/MIN
GFR NON-AFRICAN AMERICAN: >60 ML/MIN
GFR SERPL CREATININE-BSD FRML MDRD: ABNORMAL ML/MIN/{1.73_M2}
GFR SERPL CREATININE-BSD FRML MDRD: ABNORMAL ML/MIN/{1.73_M2}
GLUCOSE BLD-MCNC: 185 MG/DL (ref 70–99)
GLUCOSE URINE: ABNORMAL
HCT VFR BLD CALC: 46.7 % (ref 36–46)
HEMOGLOBIN: 16 G/DL (ref 12–16)
IMMATURE GRANULOCYTES: ABNORMAL %
KETONES, URINE: ABNORMAL
LEUKOCYTE ESTERASE, URINE: NEGATIVE
LIPASE: 25 U/L (ref 13–60)
LYMPHOCYTES # BLD: 11 % (ref 24–44)
MCH RBC QN AUTO: 28.7 PG (ref 26–34)
MCHC RBC AUTO-ENTMCNC: 34.2 G/DL (ref 31–37)
MCV RBC AUTO: 83.9 FL (ref 80–100)
MONOCYTES # BLD: 4 % (ref 1–7)
MUCUS: ABNORMAL
NITRITE, URINE: NEGATIVE
NRBC AUTOMATED: ABNORMAL PER 100 WBC
OTHER OBSERVATIONS UA: ABNORMAL
PDW BLD-RTO: 12.6 % (ref 11.5–14.5)
PH UA: 6 (ref 5–8)
PLATELET # BLD: 183 K/UL (ref 130–400)
PLATELET ESTIMATE: ABNORMAL
PMV BLD AUTO: 8.7 FL (ref 6–12)
POTASSIUM SERPL-SCNC: 3.9 MMOL/L (ref 3.7–5.3)
PROTEIN UA: ABNORMAL
RBC # BLD: 5.56 M/UL (ref 4–5.2)
RBC # BLD: ABNORMAL 10*6/UL
RBC UA: ABNORMAL /HPF (ref 0–2)
RENAL EPITHELIAL, UA: ABNORMAL /HPF
SEG NEUTROPHILS: 84 % (ref 36–66)
SEGMENTED NEUTROPHILS ABSOLUTE COUNT: 5.6 K/UL (ref 1.8–7.7)
SODIUM BLD-SCNC: 142 MMOL/L (ref 135–144)
SPECIFIC GRAVITY UA: 1.02 (ref 1–1.03)
TOTAL PROTEIN: 7.2 G/DL (ref 6.4–8.3)
TRICHOMONAS: ABNORMAL
TROPONIN INTERP: NORMAL
TROPONIN T: NORMAL NG/ML
TROPONIN, HIGH SENSITIVITY: <6 NG/L (ref 0–14)
TURBIDITY: CLEAR
URINE HGB: NEGATIVE
UROBILINOGEN, URINE: NORMAL
WBC # BLD: 6.7 K/UL (ref 3.5–11)
WBC # BLD: ABNORMAL 10*3/UL
WBC UA: ABNORMAL /HPF (ref 0–5)
YEAST: ABNORMAL

## 2019-02-13 PROCEDURE — 84484 ASSAY OF TROPONIN QUANT: CPT

## 2019-02-13 PROCEDURE — 99285 EMERGENCY DEPT VISIT HI MDM: CPT

## 2019-02-13 PROCEDURE — 71045 X-RAY EXAM CHEST 1 VIEW: CPT

## 2019-02-13 PROCEDURE — 81001 URINALYSIS AUTO W/SCOPE: CPT

## 2019-02-13 PROCEDURE — 96360 HYDRATION IV INFUSION INIT: CPT

## 2019-02-13 PROCEDURE — 85025 COMPLETE CBC W/AUTO DIFF WBC: CPT

## 2019-02-13 PROCEDURE — 6360000004 HC RX CONTRAST MEDICATION: Performed by: NURSE PRACTITIONER

## 2019-02-13 PROCEDURE — 93005 ELECTROCARDIOGRAM TRACING: CPT

## 2019-02-13 PROCEDURE — 80053 COMPREHEN METABOLIC PANEL: CPT

## 2019-02-13 PROCEDURE — 2580000003 HC RX 258: Performed by: NURSE PRACTITIONER

## 2019-02-13 PROCEDURE — 96361 HYDRATE IV INFUSION ADD-ON: CPT

## 2019-02-13 PROCEDURE — 83690 ASSAY OF LIPASE: CPT

## 2019-02-13 PROCEDURE — 74177 CT ABD & PELVIS W/CONTRAST: CPT

## 2019-02-13 RX ORDER — CIPROFLOXACIN 500 MG/1
500 TABLET, FILM COATED ORAL 2 TIMES DAILY
COMMUNITY
Start: 2019-02-05 | End: 2019-02-15

## 2019-02-13 RX ORDER — 0.9 % SODIUM CHLORIDE 0.9 %
80 INTRAVENOUS SOLUTION INTRAVENOUS ONCE
Status: COMPLETED | OUTPATIENT
Start: 2019-02-13 | End: 2019-02-13

## 2019-02-13 RX ORDER — ESCITALOPRAM OXALATE 10 MG/1
10 TABLET ORAL DAILY
Status: ON HOLD | COMMUNITY
End: 2020-11-24 | Stop reason: ALTCHOICE

## 2019-02-13 RX ORDER — KETOCONAZOLE 20 MG/G
CREAM TOPICAL DAILY
Status: ON HOLD | COMMUNITY
End: 2020-11-24 | Stop reason: ALTCHOICE

## 2019-02-13 RX ORDER — 0.9 % SODIUM CHLORIDE 0.9 %
500 INTRAVENOUS SOLUTION INTRAVENOUS ONCE
Status: COMPLETED | OUTPATIENT
Start: 2019-02-13 | End: 2019-02-13

## 2019-02-13 RX ORDER — SODIUM CHLORIDE 0.9 % (FLUSH) 0.9 %
10 SYRINGE (ML) INJECTION PRN
Status: DISCONTINUED | OUTPATIENT
Start: 2019-02-13 | End: 2019-02-13 | Stop reason: HOSPADM

## 2019-02-13 RX ADMIN — Medication 10 ML: at 18:16

## 2019-02-13 RX ADMIN — IOPAMIDOL 75 ML: 755 INJECTION, SOLUTION INTRAVENOUS at 18:16

## 2019-02-13 RX ADMIN — SODIUM CHLORIDE 80 ML: 9 INJECTION, SOLUTION INTRAVENOUS at 18:16

## 2019-02-13 RX ADMIN — SODIUM CHLORIDE 500 ML: 9 INJECTION, SOLUTION INTRAVENOUS at 14:56

## 2019-02-14 LAB
EKG ATRIAL RATE: 71 BPM
EKG P AXIS: -11 DEGREES
EKG P-R INTERVAL: 182 MS
EKG Q-T INTERVAL: 422 MS
EKG QRS DURATION: 86 MS
EKG QTC CALCULATION (BAZETT): 458 MS
EKG R AXIS: -30 DEGREES
EKG T AXIS: 38 DEGREES
EKG VENTRICULAR RATE: 71 BPM

## 2019-08-11 ENCOUNTER — APPOINTMENT (OUTPATIENT)
Dept: CT IMAGING | Age: 79
End: 2019-08-11
Payer: MEDICARE

## 2019-08-11 ENCOUNTER — APPOINTMENT (OUTPATIENT)
Dept: GENERAL RADIOLOGY | Age: 79
End: 2019-08-11
Payer: MEDICARE

## 2019-08-11 ENCOUNTER — HOSPITAL ENCOUNTER (EMERGENCY)
Age: 79
Discharge: HOME | End: 2019-08-11
Attending: EMERGENCY MEDICINE
Payer: MEDICARE

## 2019-08-11 VITALS
RESPIRATION RATE: 16 BRPM | BODY MASS INDEX: 20.49 KG/M2 | WEIGHT: 120 LBS | HEART RATE: 72 BPM | DIASTOLIC BLOOD PRESSURE: 69 MMHG | HEIGHT: 64 IN | SYSTOLIC BLOOD PRESSURE: 127 MMHG | OXYGEN SATURATION: 95 % | TEMPERATURE: 98.5 F

## 2019-08-11 DIAGNOSIS — S16.1XXA STRAIN OF NECK MUSCLE, INITIAL ENCOUNTER: ICD-10-CM
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
## 2019-08-11 DIAGNOSIS — S70.00XA CONTUSION OF HIP, UNSPECIFIED LATERALITY, INITIAL ENCOUNTER: ICD-10-CM

## 2019-08-11 LAB
ABSOLUTE EOS #: 0.11 K/UL (ref 0–0.44)
ABSOLUTE IMMATURE GRANULOCYTE: 0.01 K/UL (ref 0–0.3)
ABSOLUTE LYMPH #: 1.27 K/UL (ref 1.1–3.7)
ABSOLUTE MONO #: 0.37 K/UL (ref 0.1–1.2)
ANION GAP SERPL CALCULATED.3IONS-SCNC: 12 MMOL/L (ref 9–17)
BASOPHILS # BLD: 1 % (ref 0–2)
BASOPHILS ABSOLUTE: 0.04 K/UL (ref 0–0.2)
BUN BLDV-MCNC: 23 MG/DL (ref 8–23)
BUN/CREAT BLD: 34 (ref 9–20)
CALCIUM SERPL-MCNC: 9.1 MG/DL (ref 8.6–10.4)
CHLORIDE BLD-SCNC: 106 MMOL/L (ref 98–107)
CO2: 24 MMOL/L (ref 20–31)
CREAT SERPL-MCNC: 0.68 MG/DL (ref 0.5–0.9)
DIFFERENTIAL TYPE: NORMAL
EOSINOPHILS RELATIVE PERCENT: 2 % (ref 1–4)
GFR AFRICAN AMERICAN: >60 ML/MIN
GFR NON-AFRICAN AMERICAN: >60 ML/MIN
GFR SERPL CREATININE-BSD FRML MDRD: ABNORMAL ML/MIN/{1.73_M2}
GFR SERPL CREATININE-BSD FRML MDRD: ABNORMAL ML/MIN/{1.73_M2}
GLUCOSE BLD-MCNC: 76 MG/DL (ref 65–105)
GLUCOSE BLD-MCNC: 80 MG/DL (ref 70–99)
HCT VFR BLD CALC: 40.3 % (ref 36.3–47.1)
HEMOGLOBIN: 12.9 G/DL (ref 11.9–15.1)
IMMATURE GRANULOCYTES: 0 %
LYMPHOCYTES # BLD: 25 % (ref 24–43)
MCH RBC QN AUTO: 28.4 PG (ref 25.2–33.5)
MCHC RBC AUTO-ENTMCNC: 32 G/DL (ref 28.4–34.8)
MCV RBC AUTO: 88.8 FL (ref 82.6–102.9)
MONOCYTES # BLD: 7 % (ref 3–12)
MYOGLOBIN: 41 NG/ML (ref 25–58)
NRBC AUTOMATED: 0 PER 100 WBC
PDW BLD-RTO: 13.4 % (ref 11.8–14.4)
PLATELET # BLD: 141 K/UL (ref 138–453)
PLATELET ESTIMATE: NORMAL
PMV BLD AUTO: 10.8 FL (ref 8.1–13.5)
POTASSIUM SERPL-SCNC: 4.1 MMOL/L (ref 3.7–5.3)
RBC # BLD: 4.54 M/UL (ref 3.95–5.11)
RBC # BLD: NORMAL 10*6/UL
SEG NEUTROPHILS: 65 % (ref 36–65)
SEGMENTED NEUTROPHILS ABSOLUTE COUNT: 3.25 K/UL (ref 1.5–8.1)
SODIUM BLD-SCNC: 142 MMOL/L (ref 135–144)
TROPONIN INTERP: NORMAL
TROPONIN T: NORMAL NG/ML
TROPONIN, HIGH SENSITIVITY: <6 NG/L (ref 0–14)
WBC # BLD: 5.1 K/UL (ref 3.5–11.3)
WBC # BLD: NORMAL 10*3/UL

## 2019-08-11 PROCEDURE — 99285 EMERGENCY DEPT VISIT HI MDM: CPT

## 2019-08-11 PROCEDURE — 80048 BASIC METABOLIC PNL TOTAL CA: CPT

## 2019-08-11 PROCEDURE — 83874 ASSAY OF MYOGLOBIN: CPT

## 2019-08-11 PROCEDURE — 73521 X-RAY EXAM HIPS BI 2 VIEWS: CPT

## 2019-08-11 PROCEDURE — 85025 COMPLETE CBC W/AUTO DIFF WBC: CPT

## 2019-08-11 PROCEDURE — 93005 ELECTROCARDIOGRAM TRACING: CPT | Performed by: NURSE PRACTITIONER

## 2019-08-11 PROCEDURE — 84484 ASSAY OF TROPONIN QUANT: CPT

## 2019-08-11 PROCEDURE — 2580000003 HC RX 258: Performed by: NURSE PRACTITIONER

## 2019-08-11 PROCEDURE — 72125 CT NECK SPINE W/O DYE: CPT

## 2019-08-11 PROCEDURE — 70450 CT HEAD/BRAIN W/O DYE: CPT

## 2019-08-11 PROCEDURE — 73060 X-RAY EXAM OF HUMERUS: CPT

## 2019-08-11 PROCEDURE — 82947 ASSAY GLUCOSE BLOOD QUANT: CPT

## 2019-08-11 PROCEDURE — 71045 X-RAY EXAM CHEST 1 VIEW: CPT

## 2019-08-11 PROCEDURE — 36415 COLL VENOUS BLD VENIPUNCTURE: CPT

## 2019-08-11 RX ORDER — 0.9 % SODIUM CHLORIDE 0.9 %
500 INTRAVENOUS SOLUTION INTRAVENOUS ONCE
Status: COMPLETED | OUTPATIENT
Start: 2019-08-11 | End: 2019-08-11

## 2019-08-11 RX ADMIN — SODIUM CHLORIDE 500 ML: 0.9 INJECTION, SOLUTION INTRAVENOUS at 18:43

## 2019-08-11 ASSESSMENT — PAIN SCALES - GENERAL: PAINLEVEL_OUTOF10: 7

## 2019-08-11 ASSESSMENT — PAIN DESCRIPTION - PAIN TYPE: TYPE: ACUTE PAIN

## 2019-08-11 NOTE — ED PROVIDER NOTES
Disp-60 tablet, R-3Print      atorvastatin (LIPITOR) 20 MG tablet Take 20 mg by mouth dailyHistorical Med      donepezil (ARICEPT) 10 MG tablet Take 10 mg by mouth nightlyHistorical Med      glipiZIDE (GLUCOTROL) 5 MG tablet Take 5 mg by mouth 2 times daily (before meals)Historical Med      lisinopril (PRINIVIL;ZESTRIL) 20 MG tablet Take 20 mg by mouth dailyHistorical Med      metFORMIN (GLUCOPHAGE) 1000 MG tablet Take 1,000 mg by mouth 2 times daily (with meals)Historical Med             PAST MEDICAL HISTORY         Diagnosis Date    Dementia     Dementia     Diabetes mellitus ([facility] [address]     Fracture     proximal lt humerus. no surgery. 10/2018    Hyperlipidemia     Hypertension     UTI (urinary tract infection)        SURGICAL HISTORY     History reviewed. No pertinent surgical history. FAMILY HISTORY     History reviewed. No pertinent family history. No family status information on file. SOCIAL HISTORY      reports that she has quit smoking. She has never used smokeless tobacco. She reports that she does not drink alcohol or use drugs. REVIEW OF SYSTEMS    (2-9 systems for level 4, 10 or more for level 5)     Review of Systems   Unable to perform ROS: Dementia        Except as noted above the remainder of the review of systems was reviewed and negative. PHYSICAL EXAM    (up to 7 for level 4, 8 or more for level 5)     Vitals:    08/11/19 2018 08/11/19 2033 08/11/19 2048 08/11/19 2103   BP: 112/61 (!) 140/74 126/68 127/69   Pulse: 69 74 73 72   Resp:       Temp:       SpO2:       Weight:       Height:             Physical Exam   Constitutional: She is oriented to person, place, and time. She appears well-developed and well-nourished. Eyes: Conjunctivae are normal. Right eye exhibits no discharge. Left eye exhibits no discharge. Neck: Normal range of motion. No spinous process tenderness and no muscular tenderness present.    Cardiovascular: Normal rate, regular rhythm and intact distal pulses. Pulmonary/Chest: Effort normal and breath sounds normal. No respiratory distress. Abdominal: Soft. Bowel sounds are normal. There is no tenderness. Musculoskeletal: Normal range of motion. She exhibits no edema. Neurological: She is alert and oriented to person, place, and time. Skin: Skin is warm and dry. Capillary refill takes less than 2 seconds. No erythema. Psychiatric: She has a normal mood and affect. Her behavior is normal.           DIAGNOSTIC RESULTS     EKG: All EKG's are interpreted by the Emergency Department Physician who either signs or Co-signs this chart in the absence of a cardiologist.    EKG was interpreted by Dr. Trever Burt:   Non-plain film images such as CT, Ultrasound and MRI are read by the radiologist. Trevin Navarro radiographic images are visualized and preliminarily interpreted by the emergency physician with the below findings:    Interpretation per the Radiologist below, if available at the time of this note:    XR HIP BILATERAL W AP PELVIS (2 VIEWS)   Final Result   Subacute appearing, impacted fracture of the left humeral neck. No pelvic fracture visualized. XR HUMERUS LEFT (MIN 2 VIEWS)   Final Result   Subacute appearing, impacted fracture of the left humeral neck. No pelvic fracture visualized. CT Head WO Contrast   Final Result   No acute intracranial abnormality. No cervical fracture identified. CT CERVICAL SPINE WO CONTRAST   Final Result   No acute intracranial abnormality. No cervical fracture identified. XR CHEST PORTABLE   Final Result   No acute pulmonary process is visualized. Incidentally noted acute, impacted fracture of the left humeral neck.                  LABS:  Labs Reviewed   BASIC METABOLIC PANEL - Abnormal; Notable for the following components:       Result Value    Bun/Cre Ratio 34 (*)     All other components within normal limits   CBC WITH AUTO DIFFERENTIAL   TROP/MYOGLOBIN   POC

## 2019-08-12 LAB
EKG ATRIAL RATE: 73 BPM
EKG P AXIS: 44 DEGREES
EKG P-R INTERVAL: 180 MS
EKG Q-T INTERVAL: 392 MS
EKG QRS DURATION: 82 MS
EKG QTC CALCULATION (BAZETT): 431 MS
EKG R AXIS: -22 DEGREES
EKG T AXIS: 77 DEGREES
EKG VENTRICULAR RATE: 73 BPM

## 2020-11-23 ENCOUNTER — APPOINTMENT (OUTPATIENT)
Dept: CT IMAGING | Age: 80
DRG: 871 | End: 2020-11-23
Payer: MEDICARE

## 2020-11-23 ENCOUNTER — APPOINTMENT (OUTPATIENT)
Dept: GENERAL RADIOLOGY | Age: 80
DRG: 871 | End: 2020-11-23
Payer: MEDICARE

## 2020-11-23 ENCOUNTER — HOSPITAL ENCOUNTER (INPATIENT)
Age: 80
LOS: 3 days | Discharge: HOME HEALTH | DRG: 871 | End: 2020-11-26
Attending: EMERGENCY MEDICINE | Admitting: FAMILY MEDICINE
Payer: MEDICARE

## 2020-11-23 PROBLEM — J18.9 COMMUNITY ACQUIRED PNEUMONIA OF LEFT LOWER LOBE OF LUNG: Status: ACTIVE | Noted: 2020-11-23

## 2020-11-23 PROBLEM — E83.52 HYPERCALCEMIA: Status: ACTIVE | Noted: 2020-11-23

## 2020-11-23 PROBLEM — A41.9 SEPSIS DUE TO PNEUMONIA (HCC): Status: ACTIVE | Noted: 2020-11-23

## 2020-11-23 PROBLEM — J18.9 SEPSIS DUE TO PNEUMONIA (HCC): Status: ACTIVE | Noted: 2020-11-23

## 2020-11-23 PROBLEM — R73.9 HYPERGLYCEMIA: Status: ACTIVE | Noted: 2020-11-23

## 2020-11-23 LAB
ABSOLUTE EOS #: 0 K/UL (ref 0–0.4)
ABSOLUTE IMMATURE GRANULOCYTE: 0 K/UL (ref 0–0.3)
ABSOLUTE LYMPH #: 0.29 K/UL (ref 1–4.8)
ABSOLUTE MONO #: 0.44 K/UL (ref 0.2–0.8)
ALBUMIN SERPL-MCNC: 4.6 G/DL (ref 3.5–5.2)
ALBUMIN/GLOBULIN RATIO: ABNORMAL (ref 1–2.5)
ALP BLD-CCNC: 123 U/L (ref 35–104)
ALT SERPL-CCNC: 15 U/L (ref 5–33)
ANION GAP SERPL CALCULATED.3IONS-SCNC: 12 MMOL/L (ref 9–17)
ANION GAP SERPL CALCULATED.3IONS-SCNC: 16 MMOL/L (ref 9–17)
AST SERPL-CCNC: 22 U/L
BASOPHILS # BLD: 0 %
BASOPHILS ABSOLUTE: 0 K/UL (ref 0–0.2)
BETA-HYDROXYBUTYRATE: 0.49 MMOL/L (ref 0.02–0.27)
BILIRUB SERPL-MCNC: 1.37 MG/DL (ref 0.3–1.2)
BILIRUBIN URINE: NEGATIVE
BUN BLDV-MCNC: 32 MG/DL (ref 8–23)
BUN BLDV-MCNC: 32 MG/DL (ref 8–23)
BUN/CREAT BLD: 29 (ref 9–20)
BUN/CREAT BLD: 31 (ref 9–20)
C-REACTIVE PROTEIN: 4.4 MG/L (ref 0–5)
CALCIUM IONIZED: 1.79 MMOL/L (ref 1.13–1.33)
CALCIUM SERPL-MCNC: 12.5 MG/DL (ref 8.6–10.4)
CALCIUM SERPL-MCNC: 15.1 MG/DL (ref 8.6–10.4)
CHLORIDE BLD-SCNC: 105 MMOL/L (ref 98–107)
CHLORIDE BLD-SCNC: 111 MMOL/L (ref 98–107)
CO2: 19 MMOL/L (ref 20–31)
CO2: 19 MMOL/L (ref 20–31)
COLOR: YELLOW
COMMENT UA: NORMAL
CREAT SERPL-MCNC: 1.03 MG/DL (ref 0.5–0.9)
CREAT SERPL-MCNC: 1.11 MG/DL (ref 0.5–0.9)
D-DIMER QUANTITATIVE: >20 MG/L FEU (ref 0–0.59)
DIFFERENTIAL TYPE: ABNORMAL
EOSINOPHILS RELATIVE PERCENT: 0 % (ref 1–4)
FERRITIN: 629 UG/L (ref 13–150)
GFR AFRICAN AMERICAN: 58 ML/MIN
GFR AFRICAN AMERICAN: >60 ML/MIN
GFR NON-AFRICAN AMERICAN: 48 ML/MIN
GFR NON-AFRICAN AMERICAN: 52 ML/MIN
GFR SERPL CREATININE-BSD FRML MDRD: ABNORMAL ML/MIN/{1.73_M2}
GLUCOSE BLD-MCNC: 175 MG/DL (ref 65–105)
GLUCOSE BLD-MCNC: 213 MG/DL (ref 70–99)
GLUCOSE BLD-MCNC: 222 MG/DL (ref 65–105)
GLUCOSE BLD-MCNC: 313 MG/DL (ref 65–105)
GLUCOSE BLD-MCNC: 397 MG/DL (ref 65–105)
GLUCOSE BLD-MCNC: 405 MG/DL (ref 70–99)
GLUCOSE URINE: NEGATIVE
HCT VFR BLD CALC: 58.4 % (ref 36.3–47.1)
HEMOGLOBIN: 18.6 G/DL (ref 11.9–15.1)
IMMATURE GRANULOCYTES: 0 %
KETONES, URINE: NEGATIVE
LACTATE DEHYDROGENASE: 219 U/L (ref 135–214)
LACTIC ACID, SEPSIS WHOLE BLOOD: ABNORMAL MMOL/L (ref 0.5–1.9)
LACTIC ACID, SEPSIS: 6.5 MMOL/L (ref 0.5–1.9)
LACTIC ACID: 4.9 MMOL/L (ref 0.5–2.2)
LACTIC ACID: 6.8 MMOL/L (ref 0.5–2.2)
LEUKOCYTE ESTERASE, URINE: NEGATIVE
LYMPHOCYTES # BLD: 2 % (ref 24–44)
MCH RBC QN AUTO: 29.8 PG (ref 25.2–33.5)
MCHC RBC AUTO-ENTMCNC: 31.8 G/DL (ref 28.4–34.8)
MCV RBC AUTO: 93.6 FL (ref 82.6–102.9)
MONOCYTES # BLD: 3 % (ref 1–7)
MYOGLOBIN: 37 NG/ML (ref 25–58)
MYOGLOBIN: 66 NG/ML (ref 25–58)
MYOGLOBIN: 67 NG/ML (ref 25–58)
NITRITE, URINE: NEGATIVE
NRBC AUTOMATED: ABNORMAL PER 100 WBC
PDW BLD-RTO: 13.6 % (ref 11.8–14.4)
PH UA: 5.5 (ref 5–8)
PLATELET # BLD: 230 K/UL (ref 138–453)
PLATELET ESTIMATE: ABNORMAL
PMV BLD AUTO: 11.1 FL (ref 8.1–13.5)
POTASSIUM SERPL-SCNC: 4 MMOL/L (ref 3.7–5.3)
POTASSIUM SERPL-SCNC: 4.4 MMOL/L (ref 3.7–5.3)
PROCALCITONIN: 0.49 NG/ML
PROTEIN UA: NEGATIVE
PTH INTACT: 2.11 PG/ML (ref 15–65)
RBC # BLD: 6.24 M/UL (ref 3.95–5.11)
RBC # BLD: ABNORMAL 10*6/UL
SARS-COV-2, RAPID: NOT DETECTED
SARS-COV-2: NORMAL
SARS-COV-2: NORMAL
SEG NEUTROPHILS: 95 % (ref 36–66)
SEGMENTED NEUTROPHILS ABSOLUTE COUNT: 13.87 K/UL (ref 1.8–7.7)
SODIUM BLD-SCNC: 140 MMOL/L (ref 135–144)
SODIUM BLD-SCNC: 142 MMOL/L (ref 135–144)
SOURCE: NORMAL
SPECIFIC GRAVITY UA: 1.02 (ref 1–1.03)
TOTAL PROTEIN: 7.9 G/DL (ref 6.4–8.3)
TROPONIN INTERP: ABNORMAL
TROPONIN T: ABNORMAL NG/ML
TROPONIN, HIGH SENSITIVITY: 64 NG/L (ref 0–14)
TROPONIN, HIGH SENSITIVITY: 74 NG/L (ref 0–14)
TROPONIN, HIGH SENSITIVITY: 83 NG/L (ref 0–14)
TURBIDITY: CLEAR
URINE HGB: NEGATIVE
UROBILINOGEN, URINE: NORMAL
WBC # BLD: 14.6 K/UL (ref 3.5–11.3)
WBC # BLD: ABNORMAL 10*3/UL

## 2020-11-23 PROCEDURE — 83874 ASSAY OF MYOGLOBIN: CPT

## 2020-11-23 PROCEDURE — 83605 ASSAY OF LACTIC ACID: CPT

## 2020-11-23 PROCEDURE — 87040 BLOOD CULTURE FOR BACTERIA: CPT

## 2020-11-23 PROCEDURE — 93005 ELECTROCARDIOGRAM TRACING: CPT | Performed by: PHYSICIAN ASSISTANT

## 2020-11-23 PROCEDURE — 6360000004 HC RX CONTRAST MEDICATION: Performed by: NURSE PRACTITIONER

## 2020-11-23 PROCEDURE — 82728 ASSAY OF FERRITIN: CPT

## 2020-11-23 PROCEDURE — 2580000003 HC RX 258: Performed by: PHYSICIAN ASSISTANT

## 2020-11-23 PROCEDURE — 83615 LACTATE (LD) (LDH) ENZYME: CPT

## 2020-11-23 PROCEDURE — 80048 BASIC METABOLIC PNL TOTAL CA: CPT

## 2020-11-23 PROCEDURE — 6370000000 HC RX 637 (ALT 250 FOR IP): Performed by: NURSE PRACTITIONER

## 2020-11-23 PROCEDURE — 80053 COMPREHEN METABOLIC PANEL: CPT

## 2020-11-23 PROCEDURE — 99283 EMERGENCY DEPT VISIT LOW MDM: CPT

## 2020-11-23 PROCEDURE — 82330 ASSAY OF CALCIUM: CPT

## 2020-11-23 PROCEDURE — 82010 KETONE BODYS QUAN: CPT

## 2020-11-23 PROCEDURE — 71260 CT THORAX DX C+: CPT

## 2020-11-23 PROCEDURE — 36415 COLL VENOUS BLD VENIPUNCTURE: CPT

## 2020-11-23 PROCEDURE — 6360000002 HC RX W HCPCS: Performed by: NURSE PRACTITIONER

## 2020-11-23 PROCEDURE — 6360000002 HC RX W HCPCS: Performed by: PHYSICIAN ASSISTANT

## 2020-11-23 PROCEDURE — 99222 1ST HOSP IP/OBS MODERATE 55: CPT | Performed by: FAMILY MEDICINE

## 2020-11-23 PROCEDURE — 96365 THER/PROPH/DIAG IV INF INIT: CPT

## 2020-11-23 PROCEDURE — 74177 CT ABD & PELVIS W/CONTRAST: CPT

## 2020-11-23 PROCEDURE — APPSS45 APP SPLIT SHARED TIME 31-45 MINUTES: Performed by: NURSE PRACTITIONER

## 2020-11-23 PROCEDURE — U0002 COVID-19 LAB TEST NON-CDC: HCPCS

## 2020-11-23 PROCEDURE — 2580000003 HC RX 258: Performed by: NURSE PRACTITIONER

## 2020-11-23 PROCEDURE — 1200000000 HC SEMI PRIVATE

## 2020-11-23 PROCEDURE — 2580000003 HC RX 258: Performed by: FAMILY MEDICINE

## 2020-11-23 PROCEDURE — 0202U NFCT DS 22 TRGT SARS-COV-2: CPT

## 2020-11-23 PROCEDURE — 70450 CT HEAD/BRAIN W/O DYE: CPT

## 2020-11-23 PROCEDURE — 86140 C-REACTIVE PROTEIN: CPT

## 2020-11-23 PROCEDURE — 85025 COMPLETE CBC W/AUTO DIFF WBC: CPT

## 2020-11-23 PROCEDURE — 85379 FIBRIN DEGRADATION QUANT: CPT

## 2020-11-23 PROCEDURE — 83970 ASSAY OF PARATHORMONE: CPT

## 2020-11-23 PROCEDURE — 6370000000 HC RX 637 (ALT 250 FOR IP): Performed by: PHYSICIAN ASSISTANT

## 2020-11-23 PROCEDURE — 96375 TX/PRO/DX INJ NEW DRUG ADDON: CPT

## 2020-11-23 PROCEDURE — 84484 ASSAY OF TROPONIN QUANT: CPT

## 2020-11-23 PROCEDURE — 81003 URINALYSIS AUTO W/O SCOPE: CPT

## 2020-11-23 PROCEDURE — 82947 ASSAY GLUCOSE BLOOD QUANT: CPT

## 2020-11-23 PROCEDURE — 71045 X-RAY EXAM CHEST 1 VIEW: CPT

## 2020-11-23 PROCEDURE — 84145 PROCALCITONIN (PCT): CPT

## 2020-11-23 RX ORDER — SODIUM CHLORIDE 0.9 % (FLUSH) 0.9 %
10 SYRINGE (ML) INJECTION PRN
Status: DISCONTINUED | OUTPATIENT
Start: 2020-11-23 | End: 2020-11-23 | Stop reason: SDUPTHER

## 2020-11-23 RX ORDER — LEVOFLOXACIN 5 MG/ML
750 INJECTION, SOLUTION INTRAVENOUS
Status: DISCONTINUED | OUTPATIENT
Start: 2020-11-23 | End: 2020-11-26

## 2020-11-23 RX ORDER — PROMETHAZINE HYDROCHLORIDE 12.5 MG/1
12.5 TABLET ORAL EVERY 6 HOURS PRN
Status: DISCONTINUED | OUTPATIENT
Start: 2020-11-23 | End: 2020-11-26 | Stop reason: HOSPADM

## 2020-11-23 RX ORDER — 0.9 % SODIUM CHLORIDE 0.9 %
30 INTRAVENOUS SOLUTION INTRAVENOUS ONCE
Status: COMPLETED | OUTPATIENT
Start: 2020-11-23 | End: 2020-11-23

## 2020-11-23 RX ORDER — DEXTROSE MONOHYDRATE 25 G/50ML
12.5 INJECTION, SOLUTION INTRAVENOUS PRN
Status: DISCONTINUED | OUTPATIENT
Start: 2020-11-23 | End: 2020-11-26 | Stop reason: HOSPADM

## 2020-11-23 RX ORDER — NICOTINE 21 MG/24HR
1 PATCH, TRANSDERMAL 24 HOURS TRANSDERMAL DAILY PRN
Status: DISCONTINUED | OUTPATIENT
Start: 2020-11-23 | End: 2020-11-26 | Stop reason: HOSPADM

## 2020-11-23 RX ORDER — DEXTROSE MONOHYDRATE 50 MG/ML
100 INJECTION, SOLUTION INTRAVENOUS PRN
Status: DISCONTINUED | OUTPATIENT
Start: 2020-11-23 | End: 2020-11-26 | Stop reason: HOSPADM

## 2020-11-23 RX ORDER — SODIUM CHLORIDE 0.9 % (FLUSH) 0.9 %
10 SYRINGE (ML) INJECTION EVERY 12 HOURS SCHEDULED
Status: DISCONTINUED | OUTPATIENT
Start: 2020-11-23 | End: 2020-11-23 | Stop reason: SDUPTHER

## 2020-11-23 RX ORDER — IPRATROPIUM BROMIDE AND ALBUTEROL SULFATE 2.5; .5 MG/3ML; MG/3ML
1 SOLUTION RESPIRATORY (INHALATION)
Status: DISCONTINUED | OUTPATIENT
Start: 2020-11-24 | End: 2020-11-26 | Stop reason: HOSPADM

## 2020-11-23 RX ORDER — ACETAMINOPHEN 650 MG/1
650 SUPPOSITORY RECTAL EVERY 6 HOURS PRN
Status: DISCONTINUED | OUTPATIENT
Start: 2020-11-23 | End: 2020-11-26 | Stop reason: HOSPADM

## 2020-11-23 RX ORDER — ATORVASTATIN CALCIUM 20 MG/1
20 TABLET, FILM COATED ORAL DAILY
Status: DISCONTINUED | OUTPATIENT
Start: 2020-11-24 | End: 2020-11-26 | Stop reason: HOSPADM

## 2020-11-23 RX ORDER — DONEPEZIL HYDROCHLORIDE 10 MG/1
10 TABLET, FILM COATED ORAL NIGHTLY
Status: DISCONTINUED | OUTPATIENT
Start: 2020-11-23 | End: 2020-11-26 | Stop reason: HOSPADM

## 2020-11-23 RX ORDER — ONDANSETRON 2 MG/ML
4 INJECTION INTRAMUSCULAR; INTRAVENOUS EVERY 6 HOURS PRN
Status: DISCONTINUED | OUTPATIENT
Start: 2020-11-23 | End: 2020-11-26 | Stop reason: HOSPADM

## 2020-11-23 RX ORDER — DIPYRIDAMOLE 25 MG
25 TABLET ORAL 3 TIMES DAILY
COMMUNITY

## 2020-11-23 RX ORDER — ALBUTEROL SULFATE 2.5 MG/3ML
2.5 SOLUTION RESPIRATORY (INHALATION)
Status: DISCONTINUED | OUTPATIENT
Start: 2020-11-23 | End: 2020-11-26 | Stop reason: HOSPADM

## 2020-11-23 RX ORDER — LISINOPRIL 10 MG/1
20 TABLET ORAL DAILY
Status: CANCELLED | OUTPATIENT
Start: 2020-11-23

## 2020-11-23 RX ORDER — GLIPIZIDE 10 MG/1
10 TABLET ORAL
Status: DISCONTINUED | OUTPATIENT
Start: 2020-11-24 | End: 2020-11-26 | Stop reason: HOSPADM

## 2020-11-23 RX ORDER — SODIUM CHLORIDE 0.9 % (FLUSH) 0.9 %
10 SYRINGE (ML) INJECTION PRN
Status: DISCONTINUED | OUTPATIENT
Start: 2020-11-23 | End: 2020-11-26 | Stop reason: HOSPADM

## 2020-11-23 RX ORDER — POTASSIUM CHLORIDE 20 MEQ/1
20 TABLET, EXTENDED RELEASE ORAL
Status: DISCONTINUED | OUTPATIENT
Start: 2020-11-24 | End: 2020-11-26 | Stop reason: HOSPADM

## 2020-11-23 RX ORDER — ALOGLIPTIN 6.25 MG/1
6.25 TABLET, FILM COATED ORAL DAILY
Status: DISCONTINUED | OUTPATIENT
Start: 2020-11-24 | End: 2020-11-26 | Stop reason: HOSPADM

## 2020-11-23 RX ORDER — ESCITALOPRAM OXALATE 10 MG/1
10 TABLET ORAL DAILY
Status: DISCONTINUED | OUTPATIENT
Start: 2020-11-24 | End: 2020-11-25

## 2020-11-23 RX ORDER — 0.9 % SODIUM CHLORIDE 0.9 %
80 INTRAVENOUS SOLUTION INTRAVENOUS ONCE
Status: COMPLETED | OUTPATIENT
Start: 2020-11-23 | End: 2020-11-23

## 2020-11-23 RX ORDER — SODIUM CHLORIDE 0.9 % (FLUSH) 0.9 %
10 SYRINGE (ML) INJECTION EVERY 12 HOURS SCHEDULED
Status: DISCONTINUED | OUTPATIENT
Start: 2020-11-23 | End: 2020-11-26 | Stop reason: HOSPADM

## 2020-11-23 RX ORDER — ACETAMINOPHEN 325 MG/1
650 TABLET ORAL EVERY 6 HOURS PRN
Status: DISCONTINUED | OUTPATIENT
Start: 2020-11-23 | End: 2020-11-26 | Stop reason: HOSPADM

## 2020-11-23 RX ORDER — SODIUM CHLORIDE 9 MG/ML
INJECTION, SOLUTION INTRAVENOUS CONTINUOUS
Status: DISCONTINUED | OUTPATIENT
Start: 2020-11-23 | End: 2020-11-26 | Stop reason: HOSPADM

## 2020-11-23 RX ORDER — DIPYRIDAMOLE 25 MG
25 TABLET ORAL 3 TIMES DAILY
Status: DISCONTINUED | OUTPATIENT
Start: 2020-11-23 | End: 2020-11-26 | Stop reason: HOSPADM

## 2020-11-23 RX ORDER — ONDANSETRON 2 MG/ML
4 INJECTION INTRAMUSCULAR; INTRAVENOUS ONCE
Status: COMPLETED | OUTPATIENT
Start: 2020-11-23 | End: 2020-11-23

## 2020-11-23 RX ORDER — LEVOFLOXACIN 5 MG/ML
750 INJECTION, SOLUTION INTRAVENOUS EVERY 24 HOURS
Status: DISCONTINUED | OUTPATIENT
Start: 2020-11-23 | End: 2020-11-23

## 2020-11-23 RX ORDER — NICOTINE POLACRILEX 4 MG
15 LOZENGE BUCCAL PRN
Status: DISCONTINUED | OUTPATIENT
Start: 2020-11-23 | End: 2020-11-26 | Stop reason: HOSPADM

## 2020-11-23 RX ADMIN — IOPAMIDOL 75 ML: 755 INJECTION, SOLUTION INTRAVENOUS at 17:23

## 2020-11-23 RX ADMIN — ONDANSETRON 4 MG: 2 INJECTION INTRAMUSCULAR; INTRAVENOUS at 16:25

## 2020-11-23 RX ADMIN — CEFTRIAXONE SODIUM 1 G: 1 INJECTION, POWDER, FOR SOLUTION INTRAMUSCULAR; INTRAVENOUS at 14:53

## 2020-11-23 RX ADMIN — INSULIN HUMAN 5 UNITS: 100 INJECTION, SOLUTION PARENTERAL at 14:54

## 2020-11-23 RX ADMIN — SODIUM CHLORIDE 1000 ML: 9 INJECTION, SOLUTION INTRAVENOUS at 15:32

## 2020-11-23 RX ADMIN — SODIUM CHLORIDE: 9 INJECTION, SOLUTION INTRAVENOUS at 22:30

## 2020-11-23 RX ADMIN — INSULIN LISPRO 8 UNITS: 100 INJECTION, SOLUTION INTRAVENOUS; SUBCUTANEOUS at 19:01

## 2020-11-23 RX ADMIN — LEVOFLOXACIN 750 MG: 5 INJECTION, SOLUTION INTRAVENOUS at 23:00

## 2020-11-23 RX ADMIN — Medication 10 ML: at 17:23

## 2020-11-23 RX ADMIN — AZITHROMYCIN MONOHYDRATE 500 MG: 500 INJECTION, POWDER, LYOPHILIZED, FOR SOLUTION INTRAVENOUS at 15:32

## 2020-11-23 RX ADMIN — SODIUM CHLORIDE 80 ML: 9 INJECTION, SOLUTION INTRAVENOUS at 17:23

## 2020-11-23 ASSESSMENT — ENCOUNTER SYMPTOMS
COLOR CHANGE: 0
DIARRHEA: 0
COUGH: 0
ABDOMINAL PAIN: 0
SHORTNESS OF BREATH: 0
EYE DISCHARGE: 0
SINUS PRESSURE: 0
ABDOMINAL DISTENTION: 0
SINUS PAIN: 0
APNEA: 0
NAUSEA: 0
EYE PAIN: 0

## 2020-11-23 NOTE — ED NOTES
Pt. Arrives per [facility] after episode of syncope on way to [place]. Pt. Lives at home with son who was assisting her to the BR, when she stopped and slumped down in his arms. He was able to secure a chair under her-no falls. Son states she was unresponsive for about 5 minutes. She was starting to [employer] when EMS arrived. Pt. Incontinent. Perineal area red and raw. Mottling noted to legs and hands.       Laure Schmid RN  11/23/20 2567

## 2020-11-23 NOTE — ED PROVIDER NOTES
Bilirubin 1.37 (*)     GFR Non- 48 (*)     GFR  58 (*)     All other components within normal limits   LACTATE, SEPSIS - Abnormal; Notable for the following components:    Lactic Acid, Sepsis 6.5 (*)     All other components within normal limits   LACTIC ACID - Abnormal; Notable for the following components:    Lactic Acid 6.8 (*)     All other components within normal limits   TROP/MYOGLOBIN - Abnormal; Notable for the following components:    Troponin, High Sensitivity 64 (*)     Myoglobin 67 (*)     All other components within normal limits   BETA-HYDROXYBUTYRATE - Abnormal; Notable for the following components:    Beta-Hydroxybutyrate 0.49 (*)     All other components within normal limits   POC GLUCOSE FINGERSTICK - Abnormal; Notable for the following components:    POC Glucose 397 (*)     All other components within normal limits   CULTURE, BLOOD 1   CULTURE, BLOOD 2   RESPIRATORY PANEL, MOLECULAR, WITH COVID-19   URINE RT REFLEX TO CULTURE   COVID-19   TROP/MYOGLOBIN   TROP/MYOGLOBIN   PROCALCITONIN   C-REACTIVE PROTEIN   LACTATE DEHYDROGENASE   FERRITIN   D-DIMER, QUANTITATIVE   POCT URINALYSIS DIPSTICK     CONSULTS:  IP CONSULT TO HOSPITALIST  FINAL IMPRESSION      1. Pneumonia due to organism    2. Septicemia ([address]    3. Hyperglycemia            PASTMEDICAL HISTORY     Past Medical History:   Diagnosis Date    Dementia     Dementia     Diabetes mellitus ([facility] [address]     Fracture     proximal lt humerus. no surgery. 10/2018    Hyperlipidemia     Hypertension     UTI (urinary tract infection)      SURGICAL HISTORY     No past surgical history on file.   CURRENT MEDICATIONS       Previous Medications    ATORVASTATIN (LIPITOR) 20 MG TABLET    Take 20 mg by mouth daily    DIPYRIDAMOLE (PERSANTINE) 25 MG TABLET    Take 25 mg by mouth    DONEPEZIL (ARICEPT) 10 MG TABLET    Take 10 mg by mouth nightly    ESCITALOPRAM (LEXAPRO) 10 MG TABLET    Take 10 mg by mouth daily    GLIPIZIDE (GLUCOTROL) 5 MG TABLET    Take 10 mg by mouth     KETOCONAZOLE (NIZORAL) 2 % CREAM    Apply topically daily Apply topically daily. LISINOPRIL (PRINIVIL;ZESTRIL) 20 MG TABLET    Take 20 mg by mouth daily    METFORMIN (GLUCOPHAGE) 1000 MG TABLET    Take 1,000 mg by mouth 2 times daily (with meals)    POTASSIUM CHLORIDE (KLOR-CON M) 20 MEQ EXTENDED RELEASE TABLET    Take 1 tablet by mouth daily (with breakfast)    SITAGLIPTIN (JANUVIA) 25 MG TABLET    Take 25 mg by mouth daily     ALLERGIES     is allergic to penicillins and tramadol. FAMILY HISTORY     has no family status information on file. SOCIAL HISTORY       Social History     Tobacco Use    Smoking status: Former Smoker    Smokeless tobacco: Never Used    Tobacco comment: quit many years ago, cannot remember date   Substance Use Topics    Alcohol use: No    Drug use: No       I personally evaluated and examined the patient in conjunction with the APC and agree with the assessment, treatment plan, and disposition of the patient as recorded by the APC.    César Oreilly MD  Attending Emergency Physician        Steve Keith MD  11/23/20 2521

## 2020-11-23 NOTE — H&P
[facility]  Office: [address], DO, Paula Paul, DO, Doug Henning, DO, Kristine Estrada, DO, Valiant Sicard, MD, Amber Obrien MD, Shoaib Arora MD, Polina Coronado MD, Bryan Dorsey MD, Kelly Ramirez MD, Charly Putnam MD, David Morales MD, Jackelin Moura MD, Sarmad Bruno, DO, Ansley Mahan MD, Maria Esther Lewis MD, Kary Chin DO, Elana Howard MD,  Sharona Coleman DO, Kirti Hartley MD, Kim Donnelly MD, Vaibhav Keating, [facility], [address], [facility], Odilia Antonio, [facility], Brian Smith, CNS, Rose Portillo, CNP, Lillian Rowland, CNP, Sandy Vee, CNP, Lico Childs, CNP, Cheri Casanova, CNP, Jorge Roberts PA-C, Sharona Melara, [facility], Kevin Henry, CNP, Jonathan Robins, CNP, Sarahy Figueroa, CNP, Erendira Monroe, CNP, Robinson Hudson, [facility]    HISTORY AND PHYSICAL EXAMINATION            Date:   11/23/2020  Patient name:  Steffi Tan  Date of admission:  11/23/2020 12:25 PM  MRN:   [id number]  Account:  [de-identified]  YOB: 1940  PCP:    Lisa Orozco MD  Room:   [place] [address]  Code Status:    Prior    Chief Complaint:     Chief Complaint   Patient presents with    Loss of Consciousness       History Obtained From:     patient    History of Present Illness:     Steffi Tan is a 68 y.o. Non-/non  female who presents with Loss of Consciousness   and is admitted to the hospital for the management of Sepsis due to pneumonia [facility]). Patient reports to the emergency department by EMS. Patient apparently had a near syncopal episode while walking to the bathroom with her son. Patient is in deteriorating health and is cared for her son in the home. Patient is a very poor historian and cannot very well verbalize her current condition nor does she understand why she is being in admitted. Patient is alert and oriented to person and season however that is all.   Emergency department evaluation reveals an elevated calcium, white count is increased, no fever, and extensive infiltrate to the left lung. Urine is clean. Patient is hypotensive. Patient will be admitted for sepsis secondary to community-acquired pneumonia. Additionally there is a nodule versus mass in the right middle lobe of lung. Patient only complaint at this time is general fatigue and severe difficulty with completing her ADLs. Patient denies chest pain or shortness of breath. Patient denies his changes in her bowel habits, she denies nausea or vomiting. Past Medical History:     Past Medical History:   Diagnosis Date    [facility]     Dementia ([facility]     Diabetes mellitus ([facility]     Fracture     proximal lt humerus. no surgery. 10/2018    Hyperlipidemia     Hypertension     UTI (urinary tract infection)         Past Surgical History:     No past surgical history on file. Medications Prior to Admission:     Prior to Admission medications    Medication Sig Start Date End Date Taking?  Authorizing Provider   dipyridamole (PERSANTINE) 25 MG tablet Take 25 mg by mouth   Yes Historical Provider, MD   SITagliptin (JANUVIA) 25 MG tablet Take 25 mg by mouth daily   Yes Historical Provider, MD   potassium chloride (KLOR-CON M) 20 MEQ extended release tablet Take 1 tablet by mouth daily (with breakfast) 5/30/17  Yes Aamir Arias MD   atorvastatin (LIPITOR) 20 MG tablet Take 20 mg by mouth daily   Yes Historical Provider, MD   donepezil (ARICEPT) 10 MG tablet Take 10 mg by mouth nightly   Yes Historical Provider, MD   glipiZIDE (GLUCOTROL) 5 MG tablet Take 10 mg by mouth    Yes Historical Provider, MD   lisinopril (PRINIVIL;ZESTRIL) 20 MG tablet Take 20 mg by mouth daily   Yes Historical Provider, MD   metFORMIN (GLUCOPHAGE) 1000 MG tablet Take 1,000 mg by mouth 2 times daily (with meals)   Yes Historical Provider, MD   escitalopram (LEXAPRO) 10 MG tablet Take 10 mg by mouth daily    Historical Provider, MD   ketoconazole (NIZORAL) 2 % cream Apply topically daily Apply topically daily. Historical Provider, MD        Allergies:     Penicillins and Tramadol    Social History:     Tobacco:    reports that she has quit smoking. She has never used smokeless tobacco.  Alcohol:      reports no history of alcohol use. Drug Use:  reports no history of drug use. Family History:     Family History   Family history unknown: Yes       Review of Systems:     Positive and Negative as described in HPI. Review of Systems   Constitutional: Positive for activity change and fatigue. Negative for fever. HENT: Negative for sinus pressure and sinus pain. Eyes: Negative for pain and discharge. Respiratory: Negative for apnea, cough and shortness of breath. Gastrointestinal: Negative for abdominal distention, abdominal pain, diarrhea and nausea. Endocrine: Negative for cold intolerance and heat intolerance. Genitourinary: Negative for urgency. Musculoskeletal: Negative for arthralgias, gait problem and myalgias. Skin: Negative for color change, pallor and rash. Neurological: Positive for weakness. Negative for speech difficulty and numbness. Hematological: Negative for adenopathy. Does not bruise/bleed easily. Psychiatric/Behavioral: Positive for confusion. Negative for agitation. Physical Exam:   BP (!) 151/141   Pulse 99   Temp 97.8 °F (36.6 °C)   Resp 22   Wt 120 lb (54.4 kg)   SpO2 98%   BMI 20.60 kg/m²   Temp (24hrs), Av.8 °F (36.6 °C), Min:97.8 °F (36.6 °C), Max:97.8 °F (36.6 °C)    Recent Labs     20  1323   POCGLU 397*     No intake or output data in the 24 hours ending 20 1706    Physical Exam  Constitutional:       Appearance: She is cachectic. She is ill-appearing. HENT:      Head: Normocephalic and atraumatic. Nose: Nose normal.      Mouth/Throat:      Mouth: Mucous membranes are dry. Eyes:      Pupils: Pupils are equal, round, and reactive to light.    Neck:      Musculoskeletal: Normal range of motion and neck supple. No neck rigidity or muscular tenderness. Cardiovascular:      Rate and Rhythm: Normal rate and regular rhythm. Pulses: Normal pulses. Heart sounds: Normal heart sounds. No murmur. Pulmonary:      Effort: Pulmonary effort is normal. No respiratory distress. Breath sounds: Decreased air movement present. Examination of the right-middle field reveals decreased breath sounds. Examination of the right-lower field reveals decreased breath sounds. Examination of the left-lower field reveals decreased breath sounds. Decreased breath sounds present. No wheezing. Abdominal:      General: Abdomen is flat. Bowel sounds are normal. There is no distension. Palpations: Abdomen is soft. Tenderness: There is no abdominal tenderness. Musculoskeletal: Normal range of motion. General: No swelling or tenderness. Skin:     General: Skin is warm and dry. Capillary Refill: Capillary refill takes 2 to 3 seconds. Coloration: Skin is not pale. Findings: No bruising. Neurological:      General: No focal deficit present. Mental Status: She is alert and oriented to person, place, and time. Motor: Weakness (general) present. Psychiatric:         Mood and Affect: Mood normal.         Speech: Speech is delayed. Behavior: Behavior is slowed. Cognition and Memory: Cognition is impaired. Memory is impaired. She exhibits impaired recent memory and impaired remote memory.          Investigations:      Laboratory Testing:  Recent Results (from the past 24 hour(s))   Urinalysis Reflex to Culture    Collection Time: 11/23/20 12:55 PM    Specimen: Urine, clean catch   Result Value Ref Range    Color, UA YELLOW YELLOW    Turbidity UA CLEAR CLEAR    Glucose, Ur NEGATIVE NEGATIVE    Bilirubin Urine NEGATIVE NEGATIVE    Ketones, Urine NEGATIVE NEGATIVE    Specific Gravity, UA 1.018 1.005 - 1.030    Urine Hgb NEGATIVE NEGATIVE    pH, UA 5.5 5.0 - 8.0    Protein, UA NEGATIVE NEGATIVE    Urobilinogen, Urine Normal Normal    Nitrite, Urine NEGATIVE NEGATIVE    Leukocyte Esterase, Urine NEGATIVE NEGATIVE    Urinalysis Comments NOT REPORTED    POC Glucose Fingerstick    Collection Time: 11/23/20  1:23 PM   Result Value Ref Range    POC Glucose 397 (H) 65 - 105 mg/dL   CBC Auto Differential    Collection Time: 11/23/20  1:24 PM   Result Value Ref Range    WBC 14.6 (H) 3.5 - 11.3 k/uL    RBC 6.24 (H) 3.95 - 5.11 m/uL    Hemoglobin 18.6 (H) 11.9 - 15.1 g/dL    Hematocrit 58.4 (H) 36.3 - 47.1 %    MCV 93.6 82.6 - 102.9 fL    MCH 29.8 25.2 - 33.5 pg    MCHC 31.8 28.4 - 34.8 g/dL    RDW 13.6 11.8 - 14.4 %    Platelets 807 164 - 493 k/uL    MPV 11.1 8.1 - 13.5 fL    NRBC Automated NOT REPORTED 0.0 per 100 WBC    Differential Type NOT REPORTED     WBC Morphology NOT REPORTED     RBC Morphology NOT REPORTED     Platelet Estimate NOT REPORTED     Seg Neutrophils 95 (H) 36 - 66 %    Lymphocytes 2 (L) 24 - 44 %    Monocytes 3 1 - 7 %    Eosinophils % 0 (L) 1 - 4 %    Basophils 0 %    Immature Granulocytes 0 0 %    Segs Absolute 13.87 (H) 1.8 - 7.7 k/uL    Absolute Lymph # 0.29 (L) 1.0 - 4.8 k/uL    Absolute Mono # 0.44 0.2 - 0.8 k/uL    Absolute Eos # 0.00 0.0 - 0.4 k/uL    Basophils Absolute 0.00 0.0 - 0.2 k/uL    Absolute Immature Granulocyte 0.00 0.00 - 0.30 k/uL   Comprehensive Metabolic Panel    Collection Time: 11/23/20  1:24 PM   Result Value Ref Range    Glucose 405 (HH) 70 - 99 mg/dL    BUN 32 (H) 8 - 23 mg/dL    CREATININE 1.11 (H) 0.50 - 0.90 mg/dL    Bun/Cre Ratio 29 (H) 9 - 20    Calcium 15.1 (HH) 8.6 - 10.4 mg/dL    Sodium 140 135 - 144 mmol/L    Potassium 4.4 3.7 - 5.3 mmol/L    Chloride 105 98 - 107 mmol/L    CO2 19 (L) 20 - 31 mmol/L    Anion Gap 16 9 - 17 mmol/L    Alkaline Phosphatase 123 (H) 35 - 104 U/L    ALT 15 5 - 33 U/L    AST 22 <32 U/L    Total Bilirubin 1.37 (H) 0.3 - 1.2 mg/dL    Total Protein 7.9 6.4 - 8.3 g/dL    Alb 4.6 3.5 - 5.2 g/dL Albumin/Globulin Ratio NOT REPORTED 1.0 - 2.5    GFR Non- 48 (L) >60 mL/min    GFR  58 (L) >60 mL/min    GFR Comment          GFR Staging NOT REPORTED    Lactic Acid    Collection Time: 11/23/20  1:24 PM   Result Value Ref Range    Lactic Acid 6.8 (H) 0.5 - 2.2 mmol/L   TROP/MYOGLOBIN    Collection Time: 11/23/20  1:24 PM   Result Value Ref Range    Troponin, High Sensitivity 64 (HH) 0 - 14 ng/L    Troponin T NOT REPORTED <0.03 ng/mL    Troponin Interp NOT REPORTED     Myoglobin 67 (H) 25 - 58 ng/mL   Beta-Hydroxybutyrate    Collection Time: 11/23/20  1:24 PM   Result Value Ref Range    Beta-Hydroxybutyrate 0.49 (H) 0.02 - 0.27 mmol/L   D-Dimer, Quantitative    Collection Time: 11/23/20  1:24 PM   Result Value Ref Range    D-Dimer, Quant >20.00 (H) 0.00 - 0.59 mg/L FEU   COVID-19    Collection Time: 11/23/20  3:03 PM    Specimen: Other   Result Value Ref Range    SARS-CoV-2          SARS-CoV-2, Rapid Not Detected Not Detected    Source . NASOPHARYNGEAL SWAB     SARS-CoV-2         Lactate, Sepsis    Collection Time: 11/23/20  3:55 PM   Result Value Ref Range    Lactic Acid, Sepsis 6.5 (H) 0.5 - 1.9 mmol/L    Lactic Acid, Sepsis, Whole Blood NOT REPORTED 0.5 - 1.9 mmol/L   TROP/MYOGLOBIN    Collection Time: 11/23/20  3:55 PM   Result Value Ref Range    Troponin, High Sensitivity 74 (HH) 0 - 14 ng/L    Troponin T NOT REPORTED <0.03 ng/mL    Troponin Interp NOT REPORTED     Myoglobin 66 (H) 25 - 58 ng/mL   LACTATE DEHYDROGENASE    Collection Time: 11/23/20  3:55 PM   Result Value Ref Range     (H) 135 - 214 U/L   Ferritin    Collection Time: 11/23/20  3:55 PM   Result Value Ref Range    Ferritin 629 (H) 13 - 150 ug/L       Imaging/Diagnostics:  Ct Head Wo Contrast    Result Date: 11/23/2020  No acute intracranial abnormality.      Xr Chest Portable    Result Date: 11/23/2020  Extensive asymmetric left lung infiltrate, pneumonia the likely etiology Possible right upper lung parenchymal nodule. Repeat PA and apical lordotic views of the chest are suggested for further assessment when the patient is able cooperate for optimal imaging       Assessment :      Hospital Problems           Last Modified POA    * (Principal) Sepsis due to pneumonia ([address] 11/23/2020 Yes    Alzheimer's disease ([facility] [address] 11/23/2020 Yes    Hypoglycemia 11/23/2020 Yes    Community acquired pneumonia of left lower lobe of lung 11/23/2020 Yes    Hypertension 11/23/2020 Yes    Hyperlipidemia 11/23/2020 Yes    Diabetes mellitus ([address] 11/23/2020 Yes          Plan:     Patient status inpatient in the  Progressive Unit/Step down    1. CT chest abdomen pelvis including CTA chest secondary to significantly increased inflammatory markers including D-dimer  2. Sepsis algorithm with 30 cc/kg fluid bolus, broad-spectrum antibiotics, add procalcitonin, trend lactate, blood cultures pending. 3. Add ionized calcium  4. Broad-spectrum antibiotics for likely community-acquired pneumonia left lung  5. Glycemic control with sliding scale insulin  6. All medications for hyperlipidemia and hypertension  7. Restart home medications for Alzheimer's dementia  8. PT/OT and , anticipate placement  9. Additional work-up may be warranted depending upon imaging. Consultations:   IP CONSULT TO HOSPITALIST    Patient is admitted as inpatient status because of co-morbidities listed above, severity of signs and symptoms as outlined, requirement for current medical therapies and most importantly because of direct risk to patient if care not provided in a hospital setting. Expected length of stay > 48 hours.     PEYMAN Gilbert NP  11/23/2020  5:06 PM    Copy sent to Dr. Mathew Torres MD

## 2020-11-23 NOTE — ED PROVIDER NOTES
[address] [place] [address] ED  [facility]      Pt Name: Stacy Dillard  MRN: [id number]  [place] 1940  Date ofevaluation: 11/23/2020  Provider: Dajuan Fisher Dr       Chief Complaint   Patient presents with    Loss of Consciousness         HISTORY OF PRESENT ILLNESS  (Location/Symptom, Timing/Onset, Context/Setting, Quality, Duration, Modifying Factors, Severity.)   Stacy Dillard is a 68 y.o. female who presents to the emergency department with syncope. Patient was on to the restroom today and syncopized according to this son. Patient denies any pain or injuries. Patient has dementia. No definite alleviating aggravating factors. No fever chills. Nursing Notes were reviewed. ALLERGIES     Penicillins and Tramadol    CURRENT MEDICATIONS       Previous Medications    ATORVASTATIN (LIPITOR) 20 MG TABLET    Take 20 mg by mouth daily    DIPYRIDAMOLE (PERSANTINE) 25 MG TABLET    Take 25 mg by mouth    DONEPEZIL (ARICEPT) 10 MG TABLET    Take 10 mg by mouth nightly    ESCITALOPRAM (LEXAPRO) 10 MG TABLET    Take 10 mg by mouth daily    GLIPIZIDE (GLUCOTROL) 5 MG TABLET    Take 10 mg by mouth     KETOCONAZOLE (NIZORAL) 2 % CREAM    Apply topically daily Apply topically daily. LISINOPRIL (PRINIVIL;ZESTRIL) 20 MG TABLET    Take 20 mg by mouth daily    METFORMIN (GLUCOPHAGE) 1000 MG TABLET    Take 1,000 mg by mouth 2 times daily (with meals)    POTASSIUM CHLORIDE (KLOR-CON M) 20 MEQ EXTENDED RELEASE TABLET    Take 1 tablet by mouth daily (with breakfast)    SITAGLIPTIN (JANUVIA) 25 MG TABLET    Take 25 mg by mouth daily       PAST MEDICAL HISTORY         Diagnosis Date    Dementia ([address]     Dementia ([address]     Diabetes mellitus ([address]     Fracture     proximal lt humerus. no surgery. 10/2018    Hyperlipidemia     Hypertension     UTI (urinary tract infection)        SURGICAL HISTORY     No past surgical history on file.       FAMILY HISTORY           Family history unknown: Yes     No family status information on file. SOCIAL HISTORY      reports that she has quit smoking. She has never used smokeless tobacco. She reports that she does not drink alcohol or use drugs. REVIEW OFSYSTEMS    (2-9 systems for level 4, 10 or more for level 5)   Review of Systems    Except as noted above the remainder of the review of systems was reviewed and negative. PHYSICAL EXAM    (up to 7 for level 4, 8 or more for level 5)     ED Triage Vitals [11/23/20 1237]   BP Temp Temp src Pulse Resp SpO2 Height Weight   94/62 -- -- 113 20 -- -- 120 lb (54.4 kg)      Physical Exam  Constitutional:       Appearance: She is well-developed. HENT:      Head: Normocephalic and atraumatic. Neck:      Musculoskeletal: Normal range of motion and neck supple. Cardiovascular:      Rate and Rhythm: Normal rate and regular rhythm. Pulmonary:      Effort: Pulmonary effort is normal. No respiratory distress. Breath sounds: Normal breath sounds. No wheezing. Abdominal:      Palpations: Abdomen is soft. Musculoskeletal: Normal range of motion. Skin:     General: Skin is warm. Findings: No rash. Neurological:      Mental Status: She is alert and oriented to person, place, and time. Psychiatric:         Speech: Speech normal.         Behavior: Behavior normal.         Cognition and Memory: Cognition is not impaired. Memory is not impaired.                  DIAGNOSTIC RESULTS     EKG: All EKG's are interpreted by the Emergency Department Physician who either signs or Co-signs this chart in the absence of a cardiologist.        RADIOLOGY:   Non-plain film images such as CT, Ultrasound and MRI are read by the radiologist. Plain radiographic images arevisualized and preliminarily interpreted by the emergency physician with the below findings:        Interpretation per the Radiologist below, if available at thetime of this note:          ED BEDSIDE ULTRASOUND:   Performed by ED Physician - none    LABS:  Labs Reviewed   CBC WITH AUTO DIFFERENTIAL - Abnormal; Notable for the following components:       Result Value    WBC 14.6 (*)     RBC 6.24 (*)     Hemoglobin 18.6 (*)     Hematocrit 58.4 (*)     Seg Neutrophils 95 (*)     Lymphocytes 2 (*)     Eosinophils % 0 (*)     Segs Absolute 13.87 (*)     Absolute Lymph # 0.29 (*)     All other components within normal limits   COMPREHENSIVE METABOLIC PANEL - Abnormal; Notable for the following components:    Glucose 405 (*)     BUN 32 (*)     CREATININE 1.11 (*)     Bun/Cre Ratio 29 (*)     Calcium 15.1 (*)     CO2 19 (*)     Alkaline Phosphatase 123 (*)     Total Bilirubin 1.37 (*)     GFR Non- 48 (*)     GFR  58 (*)     All other components within normal limits   LACTATE, SEPSIS - Abnormal; Notable for the following components:    Lactic Acid, Sepsis 6.5 (*)     All other components within normal limits   LACTIC ACID - Abnormal; Notable for the following components:    Lactic Acid 6.8 (*)     All other components within normal limits   TROP/MYOGLOBIN - Abnormal; Notable for the following components:    Troponin, High Sensitivity 74 (*)     Myoglobin 66 (*)     All other components within normal limits   TROP/MYOGLOBIN - Abnormal; Notable for the following components:    Troponin, High Sensitivity 64 (*)     Myoglobin 67 (*)     All other components within normal limits   BETA-HYDROXYBUTYRATE - Abnormal; Notable for the following components:    Beta-Hydroxybutyrate 0.49 (*)     All other components within normal limits   LACTATE DEHYDROGENASE - Abnormal; Notable for the following components:     (*)     All other components within normal limits   FERRITIN - Abnormal; Notable for the following components:    Ferritin 629 (*)     All other components within normal limits   D-DIMER, QUANTITATIVE - Abnormal; Notable for the following components:    D-Dimer, Quant >20.00 (*)     All other components within normal limits POC GLUCOSE FINGERSTICK - Abnormal; Notable for the following components:    POC Glucose 397 (*)     All other components within normal limits   CULTURE, BLOOD 1   CULTURE, BLOOD 2   RESPIRATORY PANEL, MOLECULAR, WITH COVID-19   URINE RT REFLEX TO CULTURE   COVID-19   TROP/MYOGLOBIN   PROCALCITONIN   C-REACTIVE PROTEIN   PTH, INTACT WITH IONIZED CALCIUM   POCT URINALYSIS DIPSTICK       All other labs were within normal range or not returned as of this dictation. EMERGENCY DEPARTMENT COURSE and DIFFERENTIAL DIAGNOSIS/MDM:   Vitals:    Vitals:    11/23/20 1513 11/23/20 1522 11/23/20 1552 11/23/20 1607   BP: (!) 115/90 (!) 126/99 (!) 137/102 (!) 151/141   Pulse: 103 102 102 99   Resp: 22 20 22 22   Temp: 97.8 °F (36.6 °C)      SpO2: 100% 98%     Weight:         1)  Sepsis Identified at Time:      1422. Nurse Ariane Quigley notified in person as well. 2)  Sepsis Alert Protocol Guidelines:  · Blood Cultures drawn before antibiotics  · Broad Spectrum Antibiotics given stat within one hour  · Lactic Acid Q2 hours times 2 occurrences (if first lactic acid > 2.0)    3)  Fluid Bolus Guidelines:    If lactate > 4.0   OR   MAP less than 65  OR  systolic BP < 90 (two separate readings),            then 30ml/kg crystalloid fluid bolus infused, and may give over 4 hour duration. Is the patient Morbidly Obese (BMI > 30):30 cc / kg bolus ordered due to lactic acid over 4. Body mass index is 20.6 kg/m². Patient height not recorded    If Morbidly Obese the Ideal Body  (Lutz formula):   Ideal body weight (IBW) (men) = 50 kg + 2.3 kg x (height, inches - 60)    Ideal body weight (IBW) (women) = 45.5 kg + 2.3 kg x (height, inches - 60)    4)  Repeat Sepsis Exam completed during fluid bolus at time:     1600       5)  Vasopressors: For persistent hypotension after completion of 30ml/kg fluid bolus (need to measure BP Q 15 min in the hour after completion of fluid bolus).              Discuss risks and benefits of vasopressors and alternative therapies with patient and/or DPOA. 3:40 PM EST  Case discussed with Sanam Bentley np [facility] and admission was accepted. Current vitals show heart rate of 104 and BP of 123/82.      30 cc/kg bolus was given due to SIRS criteria with pneumonia on  x-ray along with lactic acid over 4. Repeat lactics and cultures were ordered. Pressors not indicated as patient      CONSULTS:  IP CONSULT TO HOSPITALIST    PROCEDURES:  Procedures    The patient was evaluated during the global COVID-19 pandemic, and that diagnosis was suspected/considered upon their initial presentation. Their evaluation, treatment and testing was consistent with current guidelines for patients who present with complaints or symptoms that may be related to COVID-19. CRITICAL CARE TIME     Due to the high probability of sudden and clinically significant deterioration in the patient's condition she required highest level of my preparedness to intervene urgently. I provided critical care time including documentation time, medication orders and management, reevaluation, vital sign assessment, ordering and reviewing of of lab tests ordering and reviewing of x-ray studies, and admission orders. Aggregate critical care time is between 35  minutes including only time during which I was engaged in work directly related to her care and did not include time spent treating other patients simultaneously. FINAL IMPRESSION      1. Pneumonia due to organism    2. Septicemia ([address]    3. Hyperglycemia          DISPOSITION/PLAN   DISPOSITION Admitted 11/23/2020 03:49:24 PM      PATIENTREFERRED TO:   No follow-up provider specified.     DISCHARGE MEDICATIONS:     New Prescriptions    No medications on file           (Please note that portions of this note were completed with a voice recognition program.  Efforts were made to edit thedictations but occasionally words are mis-transcribed.)    Chas Walsh PA-C Kyle Pritchett PA-C  11/23/20 2503

## 2020-11-23 NOTE — ED NOTES
Bed: 21  Expected date: 11/23/20  Expected time: 12:10 PM  Means of arrival:   Comments:  [facility],[address].  Cally Mcintyre RN  11/23/20 0658

## 2020-11-24 ENCOUNTER — APPOINTMENT (OUTPATIENT)
Dept: GENERAL RADIOLOGY | Age: 80
DRG: 871 | End: 2020-11-24
Payer: MEDICARE

## 2020-11-24 ENCOUNTER — APPOINTMENT (OUTPATIENT)
Dept: CT IMAGING | Age: 80
DRG: 871 | End: 2020-11-24
Payer: MEDICARE

## 2020-11-24 LAB
ADENOVIRUS PCR: NOT DETECTED
ANION GAP SERPL CALCULATED.3IONS-SCNC: 10 MMOL/L (ref 9–17)
ANION GAP SERPL CALCULATED.3IONS-SCNC: 7 MMOL/L (ref 9–17)
ANION GAP SERPL CALCULATED.3IONS-SCNC: 8 MMOL/L (ref 9–17)
ANION GAP SERPL CALCULATED.3IONS-SCNC: 8 MMOL/L (ref 9–17)
ANION GAP SERPL CALCULATED.3IONS-SCNC: 9 MMOL/L (ref 9–17)
ANION GAP SERPL CALCULATED.3IONS-SCNC: 9 MMOL/L (ref 9–17)
BORDETELLA PARAPERTUSSIS: NOT DETECTED
BORDETELLA PERTUSSIS PCR: NOT DETECTED
BUN BLDV-MCNC: 24 MG/DL (ref 8–23)
BUN BLDV-MCNC: 26 MG/DL (ref 8–23)
BUN BLDV-MCNC: 28 MG/DL (ref 8–23)
BUN BLDV-MCNC: 30 MG/DL (ref 8–23)
BUN/CREAT BLD: 26 (ref 9–20)
BUN/CREAT BLD: 27 (ref 9–20)
BUN/CREAT BLD: 30 (ref 9–20)
BUN/CREAT BLD: 30 (ref 9–20)
BUN/CREAT BLD: 32 (ref 9–20)
BUN/CREAT BLD: 33 (ref 9–20)
CALCIUM IONIZED: 1.49 MMOL/L (ref 1.13–1.33)
CALCIUM IONIZED: 1.55 MMOL/L (ref 1.13–1.33)
CALCIUM IONIZED: 1.56 MMOL/L (ref 1.13–1.33)
CALCIUM IONIZED: 1.59 MMOL/L (ref 1.13–1.33)
CALCIUM SERPL-MCNC: 10.3 MG/DL (ref 8.6–10.4)
CALCIUM SERPL-MCNC: 10.3 MG/DL (ref 8.6–10.4)
CALCIUM SERPL-MCNC: 10.6 MG/DL (ref 8.6–10.4)
CALCIUM SERPL-MCNC: 10.7 MG/DL (ref 8.6–10.4)
CALCIUM SERPL-MCNC: 11.2 MG/DL (ref 8.6–10.4)
CALCIUM SERPL-MCNC: 9.7 MG/DL (ref 8.6–10.4)
CHLAMYDIA PNEUMONIAE BY PCR: NOT DETECTED
CHLORIDE BLD-SCNC: 108 MMOL/L (ref 98–107)
CHLORIDE BLD-SCNC: 110 MMOL/L (ref 98–107)
CHLORIDE BLD-SCNC: 111 MMOL/L (ref 98–107)
CHLORIDE BLD-SCNC: 112 MMOL/L (ref 98–107)
CHLORIDE BLD-SCNC: 112 MMOL/L (ref 98–107)
CHLORIDE BLD-SCNC: 113 MMOL/L (ref 98–107)
CO2: 21 MMOL/L (ref 20–31)
CO2: 22 MMOL/L (ref 20–31)
CO2: 23 MMOL/L (ref 20–31)
CO2: 24 MMOL/L (ref 20–31)
CORONAVIRUS 229E PCR: NOT DETECTED
CORONAVIRUS HKU1 PCR: NOT DETECTED
CORONAVIRUS NL63 PCR: NOT DETECTED
CORONAVIRUS OC43 PCR: NOT DETECTED
CREAT SERPL-MCNC: 0.76 MG/DL (ref 0.5–0.9)
CREAT SERPL-MCNC: 0.86 MG/DL (ref 0.5–0.9)
CREAT SERPL-MCNC: 0.87 MG/DL (ref 0.5–0.9)
CREAT SERPL-MCNC: 1 MG/DL (ref 0.5–0.9)
CREAT SERPL-MCNC: 1.05 MG/DL (ref 0.5–0.9)
CREAT SERPL-MCNC: 1.06 MG/DL (ref 0.5–0.9)
D-DIMER QUANTITATIVE: 19.72 MG/L FEU (ref 0–0.59)
EKG ATRIAL RATE: 107 BPM
EKG P AXIS: 71 DEGREES
EKG P-R INTERVAL: 160 MS
EKG Q-T INTERVAL: 330 MS
EKG QRS DURATION: 82 MS
EKG QTC CALCULATION (BAZETT): 440 MS
EKG R AXIS: -44 DEGREES
EKG T AXIS: 80 DEGREES
EKG VENTRICULAR RATE: 107 BPM
GFR AFRICAN AMERICAN: >60 ML/MIN
GFR NON-AFRICAN AMERICAN: 50 ML/MIN
GFR NON-AFRICAN AMERICAN: 51 ML/MIN
GFR NON-AFRICAN AMERICAN: 54 ML/MIN
GFR NON-AFRICAN AMERICAN: >60 ML/MIN
GFR SERPL CREATININE-BSD FRML MDRD: ABNORMAL ML/MIN/{1.73_M2}
GLUCOSE BLD-MCNC: 135 MG/DL (ref 65–105)
GLUCOSE BLD-MCNC: 164 MG/DL (ref 70–99)
GLUCOSE BLD-MCNC: 175 MG/DL (ref 65–105)
GLUCOSE BLD-MCNC: 178 MG/DL (ref 70–99)
GLUCOSE BLD-MCNC: 182 MG/DL (ref 65–105)
GLUCOSE BLD-MCNC: 188 MG/DL (ref 70–99)
GLUCOSE BLD-MCNC: 199 MG/DL (ref 70–99)
GLUCOSE BLD-MCNC: 199 MG/DL (ref 70–99)
GLUCOSE BLD-MCNC: 215 MG/DL (ref 70–99)
HCT VFR BLD CALC: 44.8 % (ref 36.3–47.1)
HEMOGLOBIN: 14.7 G/DL (ref 11.9–15.1)
HUMAN METAPNEUMOVIRUS PCR: NOT DETECTED
INFLUENZA A BY PCR: NOT DETECTED
INFLUENZA A H1 (2009) PCR: NORMAL
INFLUENZA A H1 PCR: NORMAL
INFLUENZA A H3 PCR: NORMAL
INFLUENZA B BY PCR: NOT DETECTED
LACTIC ACID: 2.3 MMOL/L (ref 0.5–2.2)
LACTIC ACID: 2.7 MMOL/L (ref 0.5–2.2)
LACTIC ACID: 3.3 MMOL/L (ref 0.5–2.2)
LV EF: 65 %
LVEF MODALITY: NORMAL
MAGNESIUM: 1.4 MG/DL (ref 1.6–2.6)
MCH RBC QN AUTO: 30.2 PG (ref 25.2–33.5)
MCHC RBC AUTO-ENTMCNC: 32.8 G/DL (ref 28.4–34.8)
MCV RBC AUTO: 92 FL (ref 82.6–102.9)
MYCOPLASMA PNEUMONIAE PCR: NOT DETECTED
MYOGLOBIN: 23 NG/ML (ref 25–58)
MYOGLOBIN: 24 NG/ML (ref 25–58)
MYOGLOBIN: 24 NG/ML (ref 25–58)
MYOGLOBIN: 27 NG/ML (ref 25–58)
MYOGLOBIN: <21 NG/ML (ref 25–58)
MYOGLOBIN: <21 NG/ML (ref 25–58)
NRBC AUTOMATED: 0 PER 100 WBC
PARAINFLUENZA 1 PCR: NOT DETECTED
PARAINFLUENZA 2 PCR: NOT DETECTED
PARAINFLUENZA 3 PCR: NOT DETECTED
PARAINFLUENZA 4 PCR: NOT DETECTED
PDW BLD-RTO: 13.4 % (ref 11.8–14.4)
PLATELET # BLD: 162 K/UL (ref 138–453)
PMV BLD AUTO: 11 FL (ref 8.1–13.5)
POTASSIUM SERPL-SCNC: 3.4 MMOL/L (ref 3.7–5.3)
POTASSIUM SERPL-SCNC: 3.7 MMOL/L (ref 3.7–5.3)
POTASSIUM SERPL-SCNC: 4 MMOL/L (ref 3.7–5.3)
POTASSIUM SERPL-SCNC: 4 MMOL/L (ref 3.7–5.3)
POTASSIUM SERPL-SCNC: 4.1 MMOL/L (ref 3.7–5.3)
POTASSIUM SERPL-SCNC: 4.1 MMOL/L (ref 3.7–5.3)
RBC # BLD: 4.87 M/UL (ref 3.95–5.11)
RESP SYNCYTIAL VIRUS PCR: NOT DETECTED
RHINO/ENTEROVIRUS PCR: NOT DETECTED
SARS-COV-2, PCR: NOT DETECTED
SODIUM BLD-SCNC: 139 MMOL/L (ref 135–144)
SODIUM BLD-SCNC: 140 MMOL/L (ref 135–144)
SODIUM BLD-SCNC: 141 MMOL/L (ref 135–144)
SODIUM BLD-SCNC: 142 MMOL/L (ref 135–144)
SODIUM BLD-SCNC: 143 MMOL/L (ref 135–144)
SODIUM BLD-SCNC: 144 MMOL/L (ref 135–144)
SPECIMEN DESCRIPTION: NORMAL
TROPONIN INTERP: ABNORMAL
TROPONIN T: ABNORMAL NG/ML
TROPONIN, HIGH SENSITIVITY: 56 NG/L (ref 0–14)
TROPONIN, HIGH SENSITIVITY: 56 NG/L (ref 0–14)
TROPONIN, HIGH SENSITIVITY: 58 NG/L (ref 0–14)
TROPONIN, HIGH SENSITIVITY: 59 NG/L (ref 0–14)
TROPONIN, HIGH SENSITIVITY: 59 NG/L (ref 0–14)
TROPONIN, HIGH SENSITIVITY: 70 NG/L (ref 0–14)
VITAMIN D 25-HYDROXY: 22.1 NG/ML (ref 30–100)
WBC # BLD: 18.4 K/UL (ref 3.5–11.3)

## 2020-11-24 PROCEDURE — 82330 ASSAY OF CALCIUM: CPT

## 2020-11-24 PROCEDURE — 2060000000 HC ICU INTERMEDIATE R&B

## 2020-11-24 PROCEDURE — 97535 SELF CARE MNGMENT TRAINING: CPT

## 2020-11-24 PROCEDURE — 71045 X-RAY EXAM CHEST 1 VIEW: CPT

## 2020-11-24 PROCEDURE — 83874 ASSAY OF MYOGLOBIN: CPT

## 2020-11-24 PROCEDURE — 97162 PT EVAL MOD COMPLEX 30 MIN: CPT

## 2020-11-24 PROCEDURE — 99232 SBSQ HOSP IP/OBS MODERATE 35: CPT | Performed by: INTERNAL MEDICINE

## 2020-11-24 PROCEDURE — 97116 GAIT TRAINING THERAPY: CPT

## 2020-11-24 PROCEDURE — 70491 CT SOFT TISSUE NECK W/DYE: CPT

## 2020-11-24 PROCEDURE — 84484 ASSAY OF TROPONIN QUANT: CPT

## 2020-11-24 PROCEDURE — 2580000003 HC RX 258: Performed by: FAMILY MEDICINE

## 2020-11-24 PROCEDURE — 6360000004 HC RX CONTRAST MEDICATION: Performed by: NURSE PRACTITIONER

## 2020-11-24 PROCEDURE — 93306 TTE W/DOPPLER COMPLETE: CPT

## 2020-11-24 PROCEDURE — 6360000002 HC RX W HCPCS: Performed by: NURSE PRACTITIONER

## 2020-11-24 PROCEDURE — 83735 ASSAY OF MAGNESIUM: CPT

## 2020-11-24 PROCEDURE — 94640 AIRWAY INHALATION TREATMENT: CPT

## 2020-11-24 PROCEDURE — 6370000000 HC RX 637 (ALT 250 FOR IP): Performed by: NURSE PRACTITIONER

## 2020-11-24 PROCEDURE — 82947 ASSAY GLUCOSE BLOOD QUANT: CPT

## 2020-11-24 PROCEDURE — 80048 BASIC METABOLIC PNL TOTAL CA: CPT

## 2020-11-24 PROCEDURE — 82306 VITAMIN D 25 HYDROXY: CPT

## 2020-11-24 PROCEDURE — 36415 COLL VENOUS BLD VENIPUNCTURE: CPT

## 2020-11-24 PROCEDURE — 83605 ASSAY OF LACTIC ACID: CPT

## 2020-11-24 PROCEDURE — APPSS45 APP SPLIT SHARED TIME 31-45 MINUTES: Performed by: NURSE PRACTITIONER

## 2020-11-24 PROCEDURE — 97167 OT EVAL HIGH COMPLEX 60 MIN: CPT

## 2020-11-24 PROCEDURE — 2580000003 HC RX 258: Performed by: NURSE PRACTITIONER

## 2020-11-24 PROCEDURE — 97530 THERAPEUTIC ACTIVITIES: CPT

## 2020-11-24 PROCEDURE — 85027 COMPLETE CBC AUTOMATED: CPT

## 2020-11-24 PROCEDURE — 85379 FIBRIN DEGRADATION QUANT: CPT

## 2020-11-24 RX ORDER — SODIUM CHLORIDE 0.9 % (FLUSH) 0.9 %
10 SYRINGE (ML) INJECTION PRN
Status: DISCONTINUED | OUTPATIENT
Start: 2020-11-24 | End: 2020-11-26 | Stop reason: HOSPADM

## 2020-11-24 RX ORDER — 0.9 % SODIUM CHLORIDE 0.9 %
80 INTRAVENOUS SOLUTION INTRAVENOUS ONCE
Status: COMPLETED | OUTPATIENT
Start: 2020-11-24 | End: 2020-11-24

## 2020-11-24 RX ORDER — POTASSIUM CHLORIDE 7.45 MG/ML
10 INJECTION INTRAVENOUS PRN
Status: DISCONTINUED | OUTPATIENT
Start: 2020-11-24 | End: 2020-11-26 | Stop reason: HOSPADM

## 2020-11-24 RX ORDER — HALOPERIDOL 0.5 MG/1
.5-1 TABLET ORAL NIGHTLY PRN
Status: ON HOLD | COMMUNITY
End: 2021-05-14 | Stop reason: HOSPADM

## 2020-11-24 RX ORDER — MAGNESIUM SULFATE 1 G/100ML
1 INJECTION INTRAVENOUS PRN
Status: DISCONTINUED | OUTPATIENT
Start: 2020-11-24 | End: 2020-11-26 | Stop reason: HOSPADM

## 2020-11-24 RX ADMIN — DONEPEZIL HYDROCHLORIDE 10 MG: 10 TABLET, FILM COATED ORAL at 21:48

## 2020-11-24 RX ADMIN — MAGNESIUM SULFATE HEPTAHYDRATE 1 G: 1 INJECTION, SOLUTION INTRAVENOUS at 23:37

## 2020-11-24 RX ADMIN — IPRATROPIUM BROMIDE AND ALBUTEROL SULFATE 1 AMPULE: .5; 3 SOLUTION RESPIRATORY (INHALATION) at 18:14

## 2020-11-24 RX ADMIN — DIPYRIDAMOLE 25 MG: 25 TABLET, FILM COATED ORAL at 14:20

## 2020-11-24 RX ADMIN — DIPYRIDAMOLE 25 MG: 25 TABLET, FILM COATED ORAL at 09:05

## 2020-11-24 RX ADMIN — IPRATROPIUM BROMIDE AND ALBUTEROL SULFATE 1 AMPULE: .5; 3 SOLUTION RESPIRATORY (INHALATION) at 13:35

## 2020-11-24 RX ADMIN — DIPYRIDAMOLE 25 MG: 25 TABLET, FILM COATED ORAL at 21:50

## 2020-11-24 RX ADMIN — SODIUM CHLORIDE 80 ML: 0.9 INJECTION, SOLUTION INTRAVENOUS at 16:42

## 2020-11-24 RX ADMIN — IOPAMIDOL 75 ML: 755 INJECTION, SOLUTION INTRAVENOUS at 16:41

## 2020-11-24 RX ADMIN — INSULIN LISPRO 2 UNITS: 100 INJECTION, SOLUTION INTRAVENOUS; SUBCUTANEOUS at 09:06

## 2020-11-24 RX ADMIN — Medication 10 ML: at 09:05

## 2020-11-24 RX ADMIN — ATORVASTATIN CALCIUM 20 MG: 20 TABLET, FILM COATED ORAL at 09:05

## 2020-11-24 RX ADMIN — ENOXAPARIN SODIUM 40 MG: 40 INJECTION SUBCUTANEOUS at 09:05

## 2020-11-24 RX ADMIN — SODIUM CHLORIDE: 9 INJECTION, SOLUTION INTRAVENOUS at 20:12

## 2020-11-24 RX ADMIN — INSULIN LISPRO 2 UNITS: 100 INJECTION, SOLUTION INTRAVENOUS; SUBCUTANEOUS at 12:58

## 2020-11-24 RX ADMIN — INSULIN LISPRO 1 UNITS: 100 INJECTION, SOLUTION INTRAVENOUS; SUBCUTANEOUS at 21:48

## 2020-11-24 RX ADMIN — INSULIN LISPRO 1 UNITS: 100 INJECTION, SOLUTION INTRAVENOUS; SUBCUTANEOUS at 00:17

## 2020-11-24 RX ADMIN — ALOGLIPTIN 6.25 MG: 6.25 TABLET, FILM COATED ORAL at 09:05

## 2020-11-24 RX ADMIN — SODIUM CHLORIDE: 9 INJECTION, SOLUTION INTRAVENOUS at 09:05

## 2020-11-24 RX ADMIN — POTASSIUM CHLORIDE 20 MEQ: 20 TABLET, EXTENDED RELEASE ORAL at 09:05

## 2020-11-24 ASSESSMENT — PAIN SCALES - GENERAL
PAINLEVEL_OUTOF10: 0
PAINLEVEL_OUTOF10: 0

## 2020-11-24 NOTE — DISCHARGE INSTR - COC
Continuity of Care Form    Patient Name: Agnes Watkins   :  1940  MRN:  [id number]    Admit date:  2020  Discharge date:  2020    Code Status Order: Full Code   Advance Directives:   [facility] Documentation       Date/Time Healthcare Directive Type of Healthcare Directive Copy in [facility] [address] Agent's Name Healthcare Agent's Phone Number    20 [phone]  Yes, patient has an advance directive for healthcare treatment  --  --  --  --  --            Admitting Physician:  Angel Hoover MD  PCP: Aliyah Navarro MD    Discharging Nurse: [facility] Unit/Room#: 0960/2926-21  Discharging Unit Phone Number: [phone]    Emergency Contact:   Extended Emergency Contact Information  Primary Emergency Contact: [address] [place] [address] [address] of [address] [place] [address] Phone: [phone]  Relation: Child    Past Surgical History:  History reviewed. No pertinent surgical history.     Immunization History:   Immunization History   Administered Date(s) Administered    Tdap (Boostrix, Adacel) 2017       Active Problems:  Patient Active Problem List   Diagnosis Code    Alzheimer's disease ([facility] G30.9, F02.80    Hypokalemia E87.6    Hypoglycemia E16.2    Community acquired pneumonia of left lower lobe of lung J18.9    Sepsis due to pneumonia ([facility] J18.9, A41.9    Hypertension I10    Hyperlipidemia E78.5    Diabetes mellitus ([facility] E11.9    Hyperglycemia R73.9    Hypercalcemia E83.52    Pneumonia due to organism J18.9       Isolation/Infection:   Isolation            Droplet Plus          Patient Infection Status       Infection Onset Added Last Indicated Last Indicated By Review Planned Expiration Resolved Resolved By    None active    Resolved    COVID-19 Rule Out 20 Respiratory Panel, Molecular, with COVID-19 (Ordered)   20 Rule-Out Test Resulted    COVID-19 Rule Out 20 COVID-19 (Ordered)   20 signed by Matthias Penaloza RN on 11/26/20 at 4:51 PM EST    CASE MANAGEMENT/SOCIAL WORK SECTION    Inpatient Status Date: ***    Readmission Risk Assessment Score:  Readmission Risk              Risk of Unplanned Readmission:        15           Discharging to Facility/ Agency   · Name: [facility]  · Address: [address] [place] [address]  · Phone:[phone]  · Fax: [phone]      / signature: Electronically signed by Fang Trujillo RN on 11/24/20 at 12:20 PM EST    PHYSICIAN SECTION    Prognosis: Fair    Condition at Discharge: Stable    Rehab Potential (if transferring to Rehab): Fair    Recommended Labs or Other Treatments After Discharge: Follow up with Endocrinology     Physician Certification: I certify the above information and transfer of Salvador Pastor  is necessary for the continuing treatment of the diagnosis listed and that she requires [address] for greater 30 days.      Update Admission H&P: No change in H&P    PHYSICIAN SIGNATURE:  Electronically signed by Marilyn Benítez MD on 11/26/20 at 3:26 PM EST

## 2020-11-24 NOTE — ACP (ADVANCE CARE PLANNING)
Advance Care Planning     Advance Care Planning Activator (Inpatient)  Conversation Note      Date of ACP Conversation: 11/23/2020    Conversation Conducted with: Patient with [address]: Named in Advance Directive or Healthcare Power of  (name) Pravin Hussein    ACP Activator: [address] makes decisions on behalf of the incapacitated patient: Decision Maker is asked to consider and make decisions based on patient values, known preferences, or best interests. Health Care Decision Maker:     Current Designated Health Care Decision Maker:   (If there is a valid [place] named in the [facility] Makers\" box in the ACP activity, but it is not visible above, be sure to open that field and then select the health care decision maker relationship (ie \"primary\") in the blank space to the right of the name.) Validate  this information as still accurate & up-to-date; edit [place] field as needed.)    Note: Assess and validate information in current ACP documents, as indicated. If no Decision Maker listed above or available through scanned documents, then:    If no Authorized Decision Maker has previously been identified, then patient chooses [place]:  \"Who would you like to name as your primary health care decision-maker? \"               Name: Sandy Hdz        Relationship: son          Phone number: [phone]  Will [place] this person be reached easily? \" Yes  \"Who would you like to name as your back-up decision maker? \"   Name: Inocencio Dumont        Relationship: son          Phone number: [phone]  Will [place] this person be reached easily? \" Yes-lives in [place]    Note: If the relationship of these Decision-Makers to the patient does NOT follow your state's Next of Kin hierarchy, recommend that patient complete ACP document that meets state-specific requirements to allow them to act on the patient's behalf when appropriate. Care Preferences    Ventilation: \"If you were in your present state of health and suddenly became very ill and were unable to breathe on your own, what would your preference be about the use of a ventilator (breathing machine) if it were available to you? \"      Would the patient desire the use of ventilator (breathing machine)?: yes    \"If your health worsens and it becomes clear that your chance of recovery is unlikely, what would your preference be about the use of a ventilator (breathing machine) if it were available to you? \"     Would the patient desire the use of ventilator (breathing machine)?: No      Resuscitation  \"CPR works best to restart the heart when there is a sudden event, like a heart attack, in someone who is otherwise healthy. Unfortunately, CPR does not typically restart the heart for people who have serious health conditions or who are very sick. \"    \"In the event your heart stopped as a result of an underlying serious health condition, would you want attempts to be made to restart your heart (answer \"yes\" for attempt to resuscitate) or would you prefer a natural death (answer \"no\" for do not attempt to resuscitate)? \" yes      NOTE: If the patient has a valid advance directive AND now provides care preference(s) that are inconsistent with that prior directive, advise the patient to consider either: creating a new advance directive that complies with state-specific requirements; or, if that is not possible, orally revoking that prior directive in accordance with state-specific requirements, which must be documented in the EHR. [x] Yes   [] No   Educated Patient / Thuy Menjivar regarding differences between Advance Directives and portable DNR orders.     Length of ACP Conversation in minutes:  20  Conversation Outcomes:  [x] ACP discussion completed  [] Existing advance directive reviewed with patient; no changes to patient's previously recorded

## 2020-11-24 NOTE — PROGRESS NOTES
Patient transferred to room 1003 from [facility]    Patient brought with all belongings and meds

## 2020-11-24 NOTE — PROGRESS NOTES
Spoke with patient son Leonie Hoffman, patient family states that he had went into her room once EMS picked up the patient and patient saw emesis on the floor that appeared to have glitter and blood in it. Family states he found the lid of pt lava lamp to be on the floor and part of lava lamp was missing. Patient family believes patient drank lava lamp. Magaly Hdz NP notified. At this time CATHIE Harrison contacted writer via phone. CATHIE Harrison directed writer to contact [employer], [employer] explained to writer to keep eye on patient for N/V. Updated S. Waterhouse on response from [facility] and that poison control states that lava lamp did not cause abnormal lab values. Aicha Staton then called unit to discuss with writer and  After discussion with Aicha Staton who had spoken with , writer informed to keep close eye on patient and we will be monitoring labs closely as well. Writer explained to Narda that patient has dementia and family states she is more tired than normal.   Stephanie RANDLE walked by and we had discussed patient status just to give her update due to her being in house. At this time Ariana Gallagher stated that she did not know if pt was appropriate to this unit d/t lactic being 6.5, at this time she messaged Nasir and waterhouse responded that Robby Oenal thinks she should be moved and they were watching her for an hour. At this time Stephanie decided to place order to move to ICU. Patient had labs drawn at this time. Due to lactic being lower and at 4.9 and patient being more alert, the ICU transfer was cancelled and patient will be monitored in current bed assignment per Ariana Gallagher NP, Stephanie RANDLE would like crit care still consulted d/t lactic acidosis. Will continue to monitor patient.

## 2020-11-24 NOTE — CONSULTS
Pulmonary Medicine and [address] [place] [address] APRN-CNP/[facility] Grace Ann MD      Patient - Vicky Vela   MRN -  [id number]   [facility]   - 1940      Date of Admission -  2020 12:25 PM  Date of evaluation -  2020  Room - [address] [place] [address]   Hospital Day - 1  Tamika Crane MD Primary Care Physician - Florentin Vaughn MD     Reason for Consult    Lactic acidosis    Assessment   · Lactic acidosis  · Leukocytosis/sepsis  · Altered mental status/lethargy with history of dementia  · Subcentimeter pulmonary nodules, largest measuring 4 mm  · Former smoker  · Elevated D-dimer, CTA negative for PE  · Elevated CRP, ferritin and D-dimer, rule out COVID-19  · Hypercalcemia  · Diabetes mellitus with hyperglycemia  · JUAN on CKD  · Elevated troponin,? NSTEMI  · Alzheimer's, HLD, HTN    Recommendations   · Continue IV fluids  · Continue IV Levaquin   · DuoNeb every 4 hours and as needed  · Albuterol and Ipratropium Q 4 hours and prn  · Incentive spirometry every hour while awake  · Monitor blood cultures  · Blood sugar control per primary team  · Continue droplet isolation until Covid swab has resulted  · X-ray chest in am  · Labs: CBC and BMP in am  · DVT prophylaxis with low molecular weight heparin  · PT/OT  · Above assessment and plan will be reviewed with Dr. Grace Ann. Patient plan will be finalized following review by Dr. Grace Ann. · Will follow with you    Problem List      Patient Active Problem List   Diagnosis    Alzheimer's disease ([facility] [address]    Hypokalemia    Hypoglycemia    Community acquired pneumonia of left lower lobe of lung    Sepsis due to pneumonia ([facility] [address] [facility]    Hypertension    Hyperlipidemia    Diabetes mellitus ([facility] [address] [facility]    Hyperglycemia    Hypercalcemia    Pneumonia due to organism       HPI     Vicky Vela is 68 y.o.,  female, admitted because of possible sepsis/lactic acidosis. Patient has history of dementia and is poor historian. HPI obtained from chart.   She presented to the emergency room yesterday after having a syncopal episode when going to the restroom. Initial blood work showed patient to be in lactic acidosis with lactic acid level 6.8. She was also found to have hypercalcemia and hyperglycemia. Sepsis alert was called. X-ray chest was done which showed extensive left lung infiltrate. CTA chest was done secondary to elevated D-dimer. CTA chest was negative for PE and did not show any acute infiltrate or effusion. It did show some noncalcified subcentimeter pulmonary nodules. Reportedly she has a history of smoking, unsure how many pack years and when she quit. PMHx   Past Medical History      Diagnosis Date    Dementia ([facility] [address] [facility]     Diabetes mellitus ([facility] [address] [facility]     Fracture     proximal lt humerus. no surgery. 10/2018    Hyperlipidemia     Hypertension       Past Surgical History    History reviewed. No pertinent surgical history.     Meds    Current Medications    atorvastatin  20 mg Oral Daily    donepezil  10 mg Oral Nightly    escitalopram  10 mg Oral Daily    glipiZIDE  10 mg Oral QAM AC    potassium chloride  20 mEq Oral Daily with breakfast    alogliptin  6.25 mg Oral Daily    dipyridamole  25 mg Oral TID    sodium chloride flush  10 mL Intravenous 2 times per day    enoxaparin  40 mg Subcutaneous Daily    ipratropium-albuterol  1 ampule Inhalation Q4H WA    insulin lispro  0-12 Units Subcutaneous TID WC    insulin lispro  0-6 Units Subcutaneous Nightly    levofloxacin  750 mg Intravenous Q48H     sodium chloride flush, acetaminophen **OR** acetaminophen, magnesium hydroxide, promethazine **OR** ondansetron, nicotine, albuterol, glucose, dextrose, glucagon (rDNA), dextrose  IV Drips/Infusions   sodium chloride 100 mL/hr at 11/24/20 0905    dextrose       Home Medications  Medications Prior to Admission: dipyridamole (PERSANTINE) 25 MG tablet, Take 25 mg by mouth  potassium chloride (KLOR-CON M) 20 MEQ extended release tablet, Take 1 tablet by mouth daily (with breakfast)  atorvastatin (LIPITOR) 20 MG tablet, Take 20 mg by mouth daily  donepezil (ARICEPT) 10 MG tablet, Take 10 mg by mouth nightly  lisinopril (PRINIVIL;ZESTRIL) 20 MG tablet, Take 20 mg by mouth daily  metFORMIN (GLUCOPHAGE) 1000 MG tablet, Take 1,000 mg by mouth 2 times daily (with meals)  escitalopram (LEXAPRO) 10 MG tablet, Take 10 mg by mouth daily  SITagliptin (JANUVIA) 25 MG tablet, Take 25 mg by mouth daily  ketoconazole (NIZORAL) 2 % cream, Apply topically daily Apply topically daily. glipiZIDE (GLUCOTROL) 5 MG tablet, Take 10 mg by mouth     Allergies    Penicillins and Tramadol  Social History     Social History     Tobacco Use    Smoking status: Former Smoker    Smokeless tobacco: Never Used    Tobacco comment: quit many years ago, cannot remember date   Substance Use Topics    Alcohol use: No     Family History          Family history unknown: Yes     ROS - 11 systems   Unable to obtain secondary to patient's history of Alzheimer's/dementia  Vitals     height is 6' (1.829 m) and weight is 120 lb (54.4 kg). Her oral temperature is 98.4 °F (36.9 °C). Her blood pressure is 131/69 and her pulse is 91. Her respiration is 20 and oxygen saturation is 99%. Body mass index is 16.27 kg/m². I/O        Intake/Output Summary (Last 24 hours) at 11/24/2020 1037  Last data filed at 11/24/2020 1011  Gross per 24 hour   Intake 300 ml   Output --   Net 300 ml     No intake/output data recorded. Patient Vitals for the past 96 hrs (Last 3 readings):   Weight   11/23/20 1237 120 lb (54.4 kg)     Exam   General Appearance  Awake, alert, pleasantly confused, in no acute distress  HEENT - Head is normocephalic, atraumatic. Pupil reactive to light  Neck - Supple, trachea midline and straight  Lungs - no wheezes, rales or rhonchi, aeration good  Cardiovascular - Heart sounds are normal.  Regular rhythm normal rate without murmur, gallop or rub.   Abdomen - Soft, nontender, nondistended, no masses or organomegaly  Neurologic - CN II-XII are grossly intact. There are no focal motor or sensory deficits  Skin - No bruising or bleeding  Extremities - No cyanosis, clubbing or edema    Labs  - Old records and notes have been reviewed in [facility]   CBC     Lab Results   Component Value Date    WBC 18.4 11/24/2020    RBC 4.87 11/24/2020    HGB 14.7 11/24/2020    HCT 44.8 11/24/2020     11/24/2020    MCV 92.0 11/24/2020    MCH 30.2 11/24/2020    MCHC 32.8 11/24/2020    RDW 13.4 11/24/2020    LYMPHOPCT 2 11/23/2020    MONOPCT 3 11/23/2020    BASOPCT 0 11/23/2020    MONOSABS 0.44 11/23/2020    LYMPHSABS 0.29 11/23/2020    EOSABS 0.00 11/23/2020    BASOSABS 0.00 11/23/2020    DIFFTYPE NOT REPORTED 11/23/2020     BMP   Lab Results   Component Value Date     11/24/2020    K 4.0 11/24/2020     11/24/2020    CO2 21 11/24/2020    BUN 28 11/24/2020    CREATININE 1.05 11/24/2020    GLUCOSE 215 11/24/2020    CALCIUM 10.6 11/24/2020     LFTS  Lab Results   Component Value Date    ALKPHOS 123 11/23/2020    ALT 15 11/23/2020    AST 22 11/23/2020    PROT 7.9 11/23/2020    BILITOT 1.37 11/23/2020    BILIDIR 0.26 05/26/2017    IBILI 0.76 05/26/2017    LABALBU 4.6 11/23/2020   Results for Shantell Folds (MRN [id number]) as of 11/24/2020 10:37   Ref. Range 11/23/2020 15:55   Procalcitonin Latest Ref Range: <0.09 ng/mL 0.49 (H)       Radiology    CXR       CT Scans    (See actual reports for details)    \"Thank you for asking us to see this patient\"    Case discussed with nurse and patient. Questions and concerns addressed.     Electronically signed by     PEYMAN Jo-CNP  Pulmonary Critical Care and Sleep Medicine,  Patient seen under the supervision of Jun Escobar MD, [facility]

## 2020-11-24 NOTE — FLOWSHEET NOTE
Writer sebastian. Writer attempts to call patient via room phone, but patient does not answer. Per staff, patient has dementia. Writer prays for patient. Spiritual Care will follow as needed.        11/24/20 2027   Encounter Summary   Services provided to: Patient not available   Referral/Consult From: Sebastian   Continue Visiting   (11/24/20; attempt via phone)   Complexity of Encounter Low   Length of Encounter 15 minutes   Routine   Type Initial   Intervention Prayer

## 2020-11-24 NOTE — PROGRESS NOTES
Spoke with Dr. Jake Emmanuel for new consult. MD would like repeat COVID 19 swab and patient moved to [facility]. Orders placed.

## 2020-11-24 NOTE — PROGRESS NOTES
Physical Therapy    Facility/Department: [facility] PROGRESSIVE CARE  Initial Assessment    NAME: Tom Ramires  : 1940  MRN: [id number]    Date of Service: 2020    Discharge Recommendations:  Subacute/Skilled Nursing Facility      Pt presented to ED on 20 with syncope. Patient was on to the restroom today and syncopized according to this son. Patient denies any pain or injuries. RN reports patient is medically stable for therapy treatment this date. Chart reviewed prior to treatment and patient is agreeable for therapy. Assessment   Body structures, Functions, Activity limitations: Decreased functional mobility ; Decreased ADL status; Decreased strength;Decreased safe awareness;Decreased balance;Decreased cognition;Decreased endurance  Assessment: Pt with mod to max deficits of bed mobility, transfers, ambulation, balance, cognition, POOR safety awareness and endurance this session. Pt HIGH risk for falls & recommend SNF at D/C to return to baseline function and a safe D/C back to home environment. Pt requires continued PT to maximize independence with functional mobility, balance, safety awareness & activity tolerance. Prognosis: Good  Decision Making: Medium Complexity  Exam: ROM, MMT, functional mobility, activity tolerance, Balance, & [employer] AM-PAC 6 Clicks Basic Mobility  Clinical Presentation: evolving  PT Education: Goals;PT Role;Plan of Care;Transfer Training;Functional Mobility Training;Gait Training  Patient Education: Ed pt on functional mobility, safety awareness, prevention of sedentary complications  REQUIRES PT FOLLOW UP: Yes  Activity Tolerance  Activity Tolerance: Patient limited by fatigue;Patient limited by endurance; Patient limited by cognitive status       Patient Diagnosis(es): The primary encounter diagnosis was Pneumonia due to organism. Diagnoses of Septicemia ([address] and Hyperglycemia were also pertinent to this visit.      has a past medical history of Dementia ([facility] [address] Diabetes mellitus ([facility] [address] Fracture, Hyperlipidemia, and Hypertension. has no past surgical history on file. Restrictions  Restrictions/Precautions  Restrictions/Precautions: General Precautions, Fall Risk  Position Activity Restriction  Other position/activity restrictions: droplet plus for covid rule out, Heels off bed if unable to move LE's, turn/assist Q 2 hrs if unable to turn self, RUE IV, pt has dementia/confusion noted, up as anand, alarms  Vision/Hearing  Vision: Impaired(unable to assess due to pt's cognitive deficits)  Vision Exceptions: Wears glasses for reading  Hearing: Exceptions to WFL(pt states she wears hearing aids and none noted in room)  Hearing Exceptions: Hard of hearing/hearing concerns     Subjective  General  Chart Reviewed: Yes  Patient assessed for rehabilitation services?: Yes  Additional Pertinent Hx: dementia  General Comment  Comments: RN okays PT  Subjective  Subjective: Pt agreeable to PT  Pain Screening  Patient Currently in Pain: Denies  Vital Signs  BP Location: Left Arm  Level of Consciousness: Responds to Voice or New Confusion or Agitation  Patient Currently in Pain: Denies  Oxygen Therapy  O2 Device: None (Room air)       Orientation  Orientation  Overall Orientation Status: Impaired  Orientation Level: Disoriented to time;Disoriented to place; Disoriented to person;Disoriented to situation  Social/Functional History  Social/Functional History  Lives With: (Pt states she lives in [place], [place]  IADL Comments: Pt with confusion and is unable to answer questions regarding PLOF/a poor historian; no info in chart at this time. Cognition   Cognition  Overall Cognitive Status: Exceptions  Arousal/Alertness: Inconsistent responses to stimuli;Delayed responses to stimuli  Following Commands: Inconsistently follows commands; Follows one step commands with increased time; Follows one step commands with repetition  Attention Span: Attends with cues to redirect; Difficulty attending to directions; Difficulty dividing attention  Memory: Decreased short term memory;Decreased recall of recent events;Decreased recall of biographical Information  Safety Judgement: Decreased awareness of need for assistance;Decreased awareness of need for safety  Problem Solving: Assistance required to implement solutions;Assistance required to generate solutions;Assistance required to identify errors made;Assistance required to correct errors made;Decreased awareness of errors  Insights: Not aware of deficits  Initiation: Requires cues for all  Sequencing: Requires cues for all    Objective          AROM RLE (degrees)  RLE AROM: WFL  AROM LLE (degrees)  LLE AROM : WFL  AROM RUE (degrees)  RUE General AROM: See OT assessment  PROM LUE (degrees)  LUE General PROM: See OT assessment  Strength RLE  Comment: 4/5  Strength LLE  Comment: 4/5  Strength RUE  Comment: See OT assessment  Strength LUE  Comment: See OT assessment  Tone RLE  RLE Tone: Normotonic  Tone LLE  LLE Tone: Normotonic  Motor Control  Gross Motor?: WFL  Sensation  Overall Sensation Status: (unable to accurately assess due to pt's cognitive deficits)  Bed mobility  Rolling to Right: Moderate assistance  Supine to Sit: Moderate assistance  Sit to Supine: (Pt agreed to sit up in chair for breakfast)  Comment: Mod verbal instruction/tactile assist for UE hand placement on rail and proper rolling technique + support of LE's to come OOB with increased time needed. Transfers  Sit to Stand:  Moderate Assistance  Stand to sit: Moderate Assistance  Bed to Chair: Moderate assistance  Comment: Ed + tactile assist on correct use of upper body for safe sit/stand  Ambulation  Ambulation?: Yes  Ambulation 1  Surface: level tile  Device: Rolling Walker  Assistance: Maximum assistance  Quality of Gait: step to pattern, needs step by step direction for safety/manuevering R/W  Gait Deviations: Slow Mirela;Decreased step length;Decreased step height  Distance: 15ft   Pt amb to BR for toileting, Mod Assistance to sit to toilet. Pt stood with Mod Assistance,stood 3 minutes for pericare & to pull up brief, amb 2ft to sink for hand hygiene x 2 minutes then ambulated 20 ft with R/walker & sat to chair with Mod Assistance       Balance  Sitting - Static: Good  Sitting - Dynamic: Good  Standing - Static: Good;-(R/W)  Standing - Dynamic: Fair;+(R/W)  Exercises  Comments: Ed pt on functional mobility, safety awareness, prevention of sedentary complications    All lines intact, call light within reach, and patient positioned comfortably at end of treatment. All patient needs addressed prior to ending therapy session. Plan   Plan  Times per week: 1-2X/d, 5-6D/week  Current Treatment Recommendations: Strengthening, Balance Training, Functional Mobility Training, Transfer Training, Endurance Training, Gait Training, Home Exercise Program, Safety Education & Training, Patient/Caregiver Education & Training  Safety Devices  Type of devices: All fall risk precautions in place, Gait belt, Patient at risk for falls, Call light within reach, Left in chair, Chair alarm in place    G-Code       OutComes Score                                                  AM-PAC Score  AM-PAC Inpatient Mobility Raw Score : 12 (11/24/20 1042)  AM-PAC Inpatient T-Scale Score : 35.33 (11/24/20 1042)  Mobility Inpatient CMS 0-100% Score: 68.66 (11/24/20 1042)  Mobility Inpatient CMS G-Code Modifier : CL (11/24/20 1042)          Goals  Short term goals  Time Frame for Short term goals: 12 visits  Short term goal 1: Inc bed-mobility & transfers to independent to enable pt to safely get in/OOB  Short term goal 2: Inc gait to amb 150ft or > indep w/ RW to enable pt to return to previous level of independence  Short term goal 3: Inc strength to [address] standing balance to good with device to facilitate pt independence for performance of ADL's & functional mobility, & reduce fall risk;   Short

## 2020-11-24 NOTE — PROGRESS NOTES
Admitted from ER  to room 2002-2. Pt alert and confused. . Pt oriented to call light system. Assessment and vitals completed. Will monitor patient. Notified JOS Morales NP of many lab values due to multiple abnormal lab values.

## 2020-11-24 NOTE — PROGRESS NOTES
Occupational Therapy   Occupational Therapy Initial Assessment  Date: 2020   Patient Name: Seth Baker  MRN: [id number]     : 1940    RN Zack Reina reports patient is medically stable for therapy treatment this date. Chart reviewed prior to treatment and patient is agreeable for therapy. All lines intact and patient positioned comfortably at end of treatment. All patient needs addressed prior to ending therapy session. Date of Service: 2020    Discharge Recommendations:  Subacute/Skilled Nursing Facility  OT Equipment Recommendations  Equipment Needed: (continue to assess)    Assessment   Performance deficits / Impairments: Decreased functional mobility ; Decreased safe awareness;Decreased balance;Decreased coordination;Decreased ADL status; Decreased cognition;Decreased posture;Decreased endurance;Decreased high-level IADLs;Decreased strength;Decreased fine motor control  Assessment: Skilled OT POC is indicated for maximizing functional I and safety as able to reduce caregiver burden/assist.  Prognosis: Fair  Decision Making: High Complexity  OT Education: OT Role;Plan of Care;Energy Conservation;Transfer Training;Orientation  Patient Education: call light use/fall prevention, safety in function, proper bed mob tech, pursed lip breathing, postural control and weight shifting, benefits of being up OOB as able, recommendations for continued therapy services  Barriers to Learning: memory and cognitive deficts; pt has dementia  REQUIRES OT FOLLOW UP: Yes  Activity Tolerance  Activity Tolerance: Patient limited by fatigue;Treatment limited secondary to decreased cognition  Activity Tolerance: poor plus  Safety Devices  Safety Devices in place: Yes  Type of devices: Call light within reach; Chair alarm in place; Left in chair;Patient at risk for falls;Gait belt;Nurse notified(BLE's elevated on stool/pillow under to increase pt's overall comfort.   RN in with pt upon exit)           Patient Diagnosis(es): The primary encounter diagnosis was Pneumonia due to organism. Diagnoses of Septicemia ([facility] and Hyperglycemia were also pertinent to this visit. has a past medical history of Dementia ([facility] Diabetes mellitus ([facility] Fracture, Hyperlipidemia, and Hypertension. has no past surgical history on file.         PER H&P: Kasie Dupree is a 68 y.o. Non-/non  female who presents with Loss of Consciousness   and is admitted to the hospital for the management of Sepsis due to pneumonia [facility]).    Patient reports to the emergency department by EMS. Patient apparently had a near syncopal episode while walking to the bathroom with her son. Patient is in deteriorating health and is cared for her son in the home. Patient is a very poor historian and cannot very well verbalize her current condition nor does she understand why she is being in admitted. Patient is alert and oriented to person and season however that is all. Emergency department evaluation reveals an elevated calcium, white count is increased, no fever, and extensive infiltrate to the left lung. Restrictions  Restrictions/Precautions  Restrictions/Precautions: General Precautions, Fall Risk  Position Activity Restriction  Other position/activity restrictions: droplet plus for covid rule out, Heels off bed if unable to move LE's, turn/assist Q 2 hrs if unable to turn self, RUE IV, pt has dementia/confusion noted, up as anand, alarms    Subjective   General  Chart Reviewed: Yes  Patient assessed for rehabilitation services?: Yes  Family / Caregiver Present: No  Patient Currently in Pain: Denies  Vital Signs  BP Location: Left Arm  Level of Consciousness: Responds to Voice or New Confusion or Agitation  Patient Currently in Pain: Denies  Oxygen Therapy  O2 Device: None (Room air)  Social/Functional History  Social/Functional History  Lives With: (Pt states she lives in [place], [place].   Per H&P son cares for pt in the home)  IADL Comments: Pt with confusion and is unable to answer questions regarding PLOF/a poor historian; no info in chart at this time. Objective   Vision: Impaired(unable to assess due to pt's cognitive deficits)  Vision Exceptions: Wears glasses for reading  Hearing: Exceptions to WFL(pt states she wears hearing aids and none noted in room)  Hearing Exceptions: Hard of hearing/hearing concerns    Orientation  Overall Orientation Status: Impaired  Orientation Level: Oriented to person;Disoriented to situation;Disoriented to time;Disoriented to place(pt unable to state her birthdate)  Observation/Palpation  Posture: Fair(with RW)  Observation: RUE IV, fragile skin/marks and scabs noted on BUE/LE's  Edema: none  Balance  Sitting Balance: Contact guard assistance  Standing Balance: Moderate assistance(with RW)  Standing Balance  Time: stand anand < 40 seconds with RW for functional tasks  Functional Mobility  Functional - Mobility Device: Rolling Walker  Activity: (bed to bedside chair with increased time)  Assist Level: Moderate assistance  Functional Mobility Comments: MAX verbal instruction/tactile assist for upright posture, RW safety/mgt, BLE and AD sequence, weight shifitng, pursed lip breathing and awareness/assist with lines to increase overall safety. Toilet Transfers  Toilet Transfers Comments: N/T and pt with no needs during session. ADL  Feeding: Setup;Minimal assistance(for stabilization of cups at times with self drinking; SUP and verbal instruction needed for intake of meal)  Grooming: Setup; Moderate assistance  UE Bathing: Setup; Moderate assistance  LE Bathing: Setup;Maximum assistance  UE Dressing: Setup;Maximum assistance(donning 2nd hosp gown as a robe)  LE Dressing: Setup;Maximum assistance(to deacon B socks supine in bed)  Toileting: Maximum assistance  *pt need max verbal instruction/demo for sequencing with ADL tasks.       Tone RUE  RUE Tone: Normotonic  Tone LUE  LUE Tone: Normotonic  Coordination  Movements Are Fluid And Coordinated: No  Coordination and Movement description: Fine motor impairments     Bed mobility  Rolling to Right: Moderate assistance  Supine to Sit: Moderate assistance  Sit to Supine: Unable to assess(Pt agreed to sit up in chair for breakfast)  Comment: MAX verbal instruction/tactile assist for hand placement on bed rail to assist and proper log rolling tech/pursed lip breathing. Transfers  Stand Step Transfers: Moderate assistance(with RW bed to chair and with increased time)  Sit to stand: Moderate assistance  Stand to sit: Moderate assistance  Transfer Comments: MAX verbal instruction/tactile assist for B hand placement, nose over toes as able, upright posture, RW safety/mgt, BLE and AD sequence, weight shifting, controlled stand to sit, squaring self/AD up to surface prior to sitting, pursed lip breathing and awareness/assist with lines to increase safety/reduce falls. Cognition  Overall Cognitive Status: Exceptions  Arousal/Alertness: Inconsistent responses to stimuli;Delayed responses to stimuli  Following Commands: Inconsistently follows commands; Follows one step commands with increased time; Follows one step commands with repetition  Attention Span: Attends with cues to redirect; Difficulty attending to directions; Difficulty dividing attention  Memory: Decreased short term memory;Decreased recall of recent events;Decreased recall of biographical Information  Safety Judgement: Decreased awareness of need for assistance;Decreased awareness of need for safety  Problem Solving: Assistance required to implement solutions;Assistance required to generate solutions;Assistance required to identify errors made;Assistance required to correct errors made;Decreased awareness of errors  Insights: Not aware of deficits  Initiation: Requires cues for all  Sequencing: Requires cues for all  Perception  Overall Perceptual Status: Impaired     Sensation  Overall Sensation Status: (unable to accurately assess due to pt's cognitive deficits)        LUE AROM (degrees)  LUE AROM : WFL  RUE AROM (degrees)  RUE AROM : WFL  LUE Strength  LUE Strength Comment: Pt unable to follow commands for MMT. N/T BUE's                   Plan   Plan  Times per week: 4-5x/week 1x/day as anand  Current Treatment Recommendations: Strengthening, Balance Training, Functional Mobility Training, Safety Education & Training, Positioning, Cognitive/Perceptual Training, Cognitive Reorientation, Equipment Evaluation, Education, & procurement, Self-Care / ADL, Endurance Training, Neuromuscular Re-education, Patient/Caregiver Education & Training                                                      AM-PAC Score   13          Goals  Short term goals  Time Frame for Short term goals: by discharge, pt to demo  Short term goal 1: bed mob tasks with use of rail as needed to CG. Short term goal 2: UB ADL to min assist and LB ADL to mod assist with use of AD as needed. Short term goal 3: ADL transfers and functional mob with AD as needed to CG. Short term goal 4: participation in [address] [place] [address] ther ex/act for 15 mins EOB to increase act anand/maintain functional use. Short term goal 5: toileting tasks with use of AD/grab bar and BSC as needed to mod assist.  Long term goals  Long term goal 1: Pt to stand with SBA and AD anand > 5 mins as able to reduce falls for selfcare. Long term goal 2: Caregivers to be I with pressure relief, BUE HEP, fall prevention and DME/AE and AD recommendations with use of handouts as needed.   Patient Goals   Patient goals : Pt unable to state due to cognitive deficits       Therapy Time   Individual Concurrent Group Co-treatment   Time In 0822(plus 10 min chart review/RN communication)         Time Out 0906         Minutes Janae Bryan

## 2020-11-24 NOTE — PROGRESS NOTES
[facility]  Office: [address], DO, Chip Ellen, DO, Joelle Chance, DO, Amy Méndezkatlyn Blood, DO, Suri Gallego MD, Vicente Brumfield MD, Mary Dimas MD, Melly Dave MD, Jiles Alpers, MD, Priscilla Walden MD, Fam Devlin MD, Jean Cifuentes MD, Jackelin Holden MD, Kiana Neumann, DO, Nik Damico MD, Arturo Tamayo MD, Therese Curiel, DO, Dane King MD,  Ce Galdamez DO, David Mendez MD, Mauricio Gunn MD, Lobito Salazar, [facility], [address], [facility], John Labcandis, CNP, Laura Vallejo, CNS, Lynnette Stubbs, CNP, Papa Ports, CNP, [place] Mins, CNP, John Grdorothy, CNP, Eemka King, CNP, Riley Cuevas PA-C, Fransisco Quintana, [facility], Breanna Gloria, CNP, tSeve Chavis, CNP, Netta Gant, CNP, Yani Platt, [facility], Juan Valverde, [facility]    Progress Note    11/24/2020    1:52 PM    Name:   Baldemar Knapp  MRN:     [id number]     Acct:      [de-identified]   Room:   [facility]/1004-02   Day:  1  Admit Date:  11/23/2020 12:25 PM    PCP:   Reese Rivera MD  Code Status:  Full Code    Subjective:     C/C:   Chief Complaint   Patient presents with    Loss of Consciousness     Interval History Status: improved. Condition is improved. Calcium is improved. Renal function improving. Glucose controlled. Significant improvement in chest x-ray. Blood cultures negative. No fever. D-dimer down slightly. PTH results appreciated, additional imaging will be ordered. Covid negative will be stepdown out of isolation precautions. Brief History:     11/23 -admitted through the emergency department with sepsis secondary to community-acquired pneumonia. Also found to be hypercalcemic with an JUAN and hyperglycemic without ketoacidosis. 11/24 -condition improving with hydration, IV antibiotics, additional imaging ordered for PTH levels. Covid negative stepped out of isolation.     Review of Systems:     Constitutional:  negative for chills, fevers, sweats  Respiratory:  negative for cough, dyspnea on exertion, shortness of breath, wheezing  Cardiovascular:  negative for chest pain, chest pressure/discomfort, lower extremity edema, palpitations  Gastrointestinal:  negative for abdominal pain, constipation, diarrhea, nausea, vomiting  Neurological:  negative for dizziness, headache    Medications: Allergies: Allergies   Allergen Reactions    Penicillins     Tramadol Nausea Only     Pt states she feels loopy and has loss of appetite        Current Meds:   Scheduled Meds:    atorvastatin  20 mg Oral Daily    donepezil  10 mg Oral Nightly    escitalopram  10 mg Oral Daily    glipiZIDE  10 mg Oral QAM AC    potassium chloride  20 mEq Oral Daily with breakfast    alogliptin  6.25 mg Oral Daily    dipyridamole  25 mg Oral TID    sodium chloride flush  10 mL Intravenous 2 times per day    enoxaparin  40 mg Subcutaneous Daily    ipratropium-albuterol  1 ampule Inhalation Q4H WA    insulin lispro  0-12 Units Subcutaneous TID WC    insulin lispro  0-6 Units Subcutaneous Nightly    levofloxacin  750 mg Intravenous Q48H     Continuous Infusions:    sodium chloride 100 mL/hr at 11/24/20 0905    dextrose       PRN Meds: sodium chloride flush, acetaminophen **OR** acetaminophen, magnesium hydroxide, promethazine **OR** ondansetron, nicotine, albuterol, glucose, dextrose, glucagon (rDNA), dextrose    Data:     Past Medical History:   has a past medical history of Dementia ([facility] Diabetes mellitus ([facility] Fracture, Hyperlipidemia, and Hypertension. Social History:   reports that she has quit smoking. She has never used smokeless tobacco. She reports that she does not drink alcohol or use drugs.      Family History:   Family History   Family history unknown: Yes       Vitals:  /70   Pulse 92   Temp 97.7 °F (36.5 °C) (Oral)   Resp 18   Ht 6' (1.829 m)   Wt 120 lb (54.4 kg)   SpO2 97%   BMI 16.27 kg/m²   Temp (24hrs), reactive. 5. Gas within the endometrium, indeterminate. This could be due to recent procedure, but infection/endometritis cannot be excluded. Clinical correlation is recommended. RECOMMENDATIONS: Fleischner Society guidelines for follow-up and management of incidentally detected pulmonary nodules: Multiple Solid Nodules: Nodule size less than 6 mm In a low-risk patient, no routine follow-up. In a high-risk patient, optional CT at 12 months. - Low risk patients include individuals with minimal or absent history of smoking and other known risk factors. - High risk patients include individuals with a history or smoking or known risk factors. Radiology 2017 http://pubs. rsna.org/doi/full/10.1148/radiol. 4433417201       Physical Examination:        General appearance:  alert, cooperative and no distress  Mental Status:  oriented to person, place and time and normal affect  Lungs:  clear to auscultation bilaterally, normal effort  Heart:  regular rate and rhythm, no murmur  Abdomen:  soft, nontender, nondistended, normal bowel sounds, no masses, hepatomegaly, splenomegaly  Extremities:  no edema, redness, tenderness in the calves  Skin:  no gross lesions, rashes, induration    Assessment:        Hospital Problems           Last Modified POA    * (Principal) Sepsis due to pneumonia ([address] 11/23/2020 Yes    Alzheimer's disease ([address] 11/23/2020 Yes    Hypoglycemia 11/23/2020 Yes    Community acquired pneumonia of left lower lobe of lung 11/23/2020 Yes    Hypertension 11/23/2020 Yes    Hyperlipidemia 11/23/2020 Yes    Diabetes mellitus ([address] 11/23/2020 Yes    Hyperglycemia 11/23/2020 Yes    Hypercalcemia 11/23/2020 Yes          Plan:        1. Continue IV hydration, calcium improved, blood pressure improved, lactic acid improved  2. Continue IV antibiotics for community-acquired pneumonia, dramatic improvement on x-ray overnight  3. Continue to monitor calcium levels  4.  CT neck soft tissues with contrast for evaluation of thyroid/parathyroid gland, dramatic improvement in hypercalcemia with IV fluids. 5. Albumin normal, protein normal, still suspect malnutrition and dehydration  6. Will consider treatment for primary hyperthyroidism if diagnosis is confirmed  7. Continue glycemic control, correcting well  8. PT/OT and anticipate placement.     PEYMAN Herron - NP  11/24/2020  1:52 PM

## 2020-11-24 NOTE — PLAN OF CARE
Problem: Falls - Risk of:  Goal: Will remain free from falls  Description: Will remain free from falls  Outcome: Ongoing   Pt fall risk, fall band present, falling star, safety alarm activated and in use as needed. Hourly rounding performed. Pt encouraged to use call light. See Robina Leggett fall risk assessment.

## 2020-11-24 NOTE — PROGRESS NOTES
Patient transferred to med surg unit room 2011. Son notified of transfer.  Report given to Abby Salazar.

## 2020-11-24 NOTE — PROGRESS NOTES
Transitions of Care Pharmacy Service   Medication Review    The patient's list of current home medications has been reviewed. Source(s) of information: Care Everywhere (PCP), family, surescripts refill report      PROVIDER ACTION REQUESTED  Medications that need to be addressed by a physician/nurse practitioner:    Medication Action Requested   Lexapro 10mg   Not a current home med -- please discontinue   Glipizide 10mg daily   Home dose is 10mg AM and 5mg PM instead -- please adjust order as appropriate      Haloperidol prn needs  review/reorder as appropriate           Please feel free to call me with any questions about this encounter. Thank you.     Cristopher Dickson [facility]   Transitions of Care Pharmacy Service  Phone:  [phone]  Fax: [phone]      Electronically signed by Cristopher Dickson, [address] [place] [address] on 11/24/2020 at 3:24 PM           Medications Prior to Admission:   haloperidol (HALDOL) 0.5 MG tablet, Take 0.5-1 mg by mouth nightly as needed (sleep)  dipyridamole (PERSANTINE) 25 MG tablet, Take 25 mg by mouth 3 times daily   potassium chloride (KLOR-CON M) 20 MEQ extended release tablet, Take 1 tablet by mouth daily (with breakfast)  atorvastatin (LIPITOR) 20 MG tablet, Take 20 mg by mouth daily  donepezil (ARICEPT) 10 MG tablet, Take 10 mg by mouth nightly  glipiZIDE (GLUCOTROL) 5 MG tablet, Take 5-10 mg by mouth See Admin Instructions Indications: Takes 10mg AM and 5mg PM   lisinopril (PRINIVIL;ZESTRIL) 20 MG tablet, Take 20 mg by mouth daily  metFORMIN (GLUCOPHAGE) 1000 MG tablet, Take 1,000 mg by mouth 2 times daily (with meals)  SITagliptin (JANUVIA) 25 MG tablet, Take 25 mg by mouth daily

## 2020-11-24 NOTE — PROGRESS NOTES
Pt arrived to floor via stretcher from Med surg   and was transfered to bed. Vitals taken. Admission and assessment complete. No distress noted. See doc flowsheet and admission navigator for details. POC and education initiated and reviewed with patient. Call light within reach, and pt educated on its use. Bed in lowest position, and locked. Side rails up x 2. Denied further questions or needs at this time. Will continue to monitor.

## 2020-11-24 NOTE — PROGRESS NOTES
Poison control was notified on patient situation and potentially drinking lava lamp. Poison control states there is a wax and liquid in the lava lamp. Poison control believes her labs are not related to her lab values. Vomit at home would be from the lava lamp. Vomit or loose stool would be expected from ingesting contents of lava lab.

## 2020-11-25 LAB
ABSOLUTE EOS #: 0.04 K/UL (ref 0–0.44)
ABSOLUTE IMMATURE GRANULOCYTE: 0.06 K/UL (ref 0–0.3)
ABSOLUTE LYMPH #: 0.94 K/UL (ref 1.1–3.7)
ABSOLUTE MONO #: 0.58 K/UL (ref 0.1–1.2)
ANION GAP SERPL CALCULATED.3IONS-SCNC: 6 MMOL/L (ref 9–17)
ANION GAP SERPL CALCULATED.3IONS-SCNC: 6 MMOL/L (ref 9–17)
ANION GAP SERPL CALCULATED.3IONS-SCNC: 8 MMOL/L (ref 9–17)
ANION GAP SERPL CALCULATED.3IONS-SCNC: 9 MMOL/L (ref 9–17)
ANION GAP SERPL CALCULATED.3IONS-SCNC: 9 MMOL/L (ref 9–17)
ANION GAP SERPL CALCULATED.3IONS-SCNC: NORMAL MMOL/L
BASOPHILS # BLD: 0 % (ref 0–2)
BASOPHILS ABSOLUTE: 0.05 K/UL (ref 0–0.2)
BUN BLDV-MCNC: 12 MG/DL (ref 8–23)
BUN BLDV-MCNC: 13 MG/DL (ref 8–23)
BUN BLDV-MCNC: 16 MG/DL (ref 8–23)
BUN BLDV-MCNC: 16 MG/DL (ref 8–23)
BUN BLDV-MCNC: 21 MG/DL (ref 8–23)
BUN BLDV-MCNC: NORMAL MG/DL (ref 8–23)
BUN/CREAT BLD: 23 (ref 9–20)
BUN/CREAT BLD: 24 (ref 9–20)
BUN/CREAT BLD: 29 (ref 9–20)
BUN/CREAT BLD: 29 (ref 9–20)
BUN/CREAT BLD: 33 (ref 9–20)
BUN/CREAT BLD: NORMAL (ref 9–20)
CALCIUM IONIZED: 1.07 MMOL/L (ref 1.13–1.33)
CALCIUM IONIZED: 1.32 MMOL/L (ref 1.13–1.33)
CALCIUM IONIZED: 1.33 MMOL/L (ref 1.13–1.33)
CALCIUM IONIZED: 1.34 MMOL/L (ref 1.13–1.33)
CALCIUM IONIZED: 1.39 MMOL/L (ref 1.13–1.33)
CALCIUM IONIZED: 1.41 MMOL/L (ref 1.13–1.33)
CALCIUM SERPL-MCNC: 9 MG/DL (ref 8.6–10.4)
CALCIUM SERPL-MCNC: 9.5 MG/DL (ref 8.6–10.4)
CALCIUM SERPL-MCNC: 9.6 MG/DL (ref 8.6–10.4)
CALCIUM SERPL-MCNC: NORMAL MG/DL (ref 8.6–10.4)
CHLORIDE BLD-SCNC: 108 MMOL/L (ref 98–107)
CHLORIDE BLD-SCNC: 109 MMOL/L (ref 98–107)
CHLORIDE BLD-SCNC: 109 MMOL/L (ref 98–107)
CHLORIDE BLD-SCNC: 110 MMOL/L (ref 98–107)
CHLORIDE BLD-SCNC: 113 MMOL/L (ref 98–107)
CHLORIDE BLD-SCNC: NORMAL MMOL/L (ref 98–107)
CO2: 22 MMOL/L (ref 20–31)
CO2: 22 MMOL/L (ref 20–31)
CO2: 23 MMOL/L (ref 20–31)
CO2: 23 MMOL/L (ref 20–31)
CO2: 24 MMOL/L (ref 20–31)
CO2: NORMAL MMOL/L (ref 20–31)
CREAT SERPL-MCNC: 0.53 MG/DL (ref 0.5–0.9)
CREAT SERPL-MCNC: 0.54 MG/DL (ref 0.5–0.9)
CREAT SERPL-MCNC: 0.55 MG/DL (ref 0.5–0.9)
CREAT SERPL-MCNC: 0.56 MG/DL (ref 0.5–0.9)
CREAT SERPL-MCNC: 0.64 MG/DL (ref 0.5–0.9)
CREAT SERPL-MCNC: NORMAL MG/DL (ref 0.5–0.9)
D-DIMER QUANTITATIVE: 3.14 MG/L FEU (ref 0–0.59)
DIFFERENTIAL TYPE: ABNORMAL
EOSINOPHILS RELATIVE PERCENT: 0 % (ref 1–4)
GFR AFRICAN AMERICAN: >60 ML/MIN
GFR AFRICAN AMERICAN: NORMAL ML/MIN
GFR NON-AFRICAN AMERICAN: >60 ML/MIN
GFR NON-AFRICAN AMERICAN: NORMAL ML/MIN
GFR SERPL CREATININE-BSD FRML MDRD: ABNORMAL ML/MIN/{1.73_M2}
GFR SERPL CREATININE-BSD FRML MDRD: NORMAL ML/MIN/{1.73_M2}
GFR SERPL CREATININE-BSD FRML MDRD: NORMAL ML/MIN/{1.73_M2}
GLUCOSE BLD-MCNC: 108 MG/DL (ref 70–99)
GLUCOSE BLD-MCNC: 110 MG/DL (ref 65–105)
GLUCOSE BLD-MCNC: 126 MG/DL (ref 70–99)
GLUCOSE BLD-MCNC: 130 MG/DL (ref 65–105)
GLUCOSE BLD-MCNC: 131 MG/DL (ref 70–99)
GLUCOSE BLD-MCNC: 133 MG/DL (ref 70–99)
GLUCOSE BLD-MCNC: 150 MG/DL (ref 65–105)
GLUCOSE BLD-MCNC: 152 MG/DL (ref 65–105)
GLUCOSE BLD-MCNC: 180 MG/DL (ref 70–99)
GLUCOSE BLD-MCNC: NORMAL MG/DL (ref 70–99)
HCT VFR BLD CALC: 40.3 % (ref 36.3–47.1)
HEMOGLOBIN: 12.6 G/DL (ref 11.9–15.1)
IMMATURE GRANULOCYTES: 1 %
LACTIC ACID: 2.6 MMOL/L (ref 0.5–2.2)
LYMPHOCYTES # BLD: 8 % (ref 24–43)
MAGNESIUM: 1.4 MG/DL (ref 1.6–2.6)
MAGNESIUM: 1.5 MG/DL (ref 1.6–2.6)
MAGNESIUM: 1.6 MG/DL (ref 1.6–2.6)
MCH RBC QN AUTO: 29.6 PG (ref 25.2–33.5)
MCHC RBC AUTO-ENTMCNC: 31.3 G/DL (ref 28.4–34.8)
MCV RBC AUTO: 94.8 FL (ref 82.6–102.9)
MONOCYTES # BLD: 5 % (ref 3–12)
MYOGLOBIN: 22 NG/ML (ref 25–58)
MYOGLOBIN: <21 NG/ML (ref 25–58)
MYOGLOBIN: NORMAL NG/ML (ref 25–58)
NRBC AUTOMATED: 0 PER 100 WBC
PDW BLD-RTO: 13.7 % (ref 11.8–14.4)
PLATELET # BLD: 129 K/UL (ref 138–453)
PLATELET ESTIMATE: ABNORMAL
PMV BLD AUTO: 11.6 FL (ref 8.1–13.5)
POTASSIUM SERPL-SCNC: 3.3 MMOL/L (ref 3.7–5.3)
POTASSIUM SERPL-SCNC: 3.4 MMOL/L (ref 3.7–5.3)
POTASSIUM SERPL-SCNC: 3.5 MMOL/L (ref 3.7–5.3)
POTASSIUM SERPL-SCNC: 3.6 MMOL/L (ref 3.7–5.3)
POTASSIUM SERPL-SCNC: 3.7 MMOL/L (ref 3.7–5.3)
POTASSIUM SERPL-SCNC: NORMAL MMOL/L (ref 3.7–5.3)
RBC # BLD: 4.25 M/UL (ref 3.95–5.11)
RBC # BLD: ABNORMAL 10*6/UL
SEG NEUTROPHILS: 86 % (ref 36–65)
SEGMENTED NEUTROPHILS ABSOLUTE COUNT: 10.6 K/UL (ref 1.5–8.1)
SODIUM BLD-SCNC: 139 MMOL/L (ref 135–144)
SODIUM BLD-SCNC: 139 MMOL/L (ref 135–144)
SODIUM BLD-SCNC: 140 MMOL/L (ref 135–144)
SODIUM BLD-SCNC: 141 MMOL/L (ref 135–144)
SODIUM BLD-SCNC: 142 MMOL/L (ref 135–144)
SODIUM BLD-SCNC: NORMAL MMOL/L (ref 135–144)
TROPONIN INTERP: ABNORMAL
TROPONIN INTERP: NORMAL
TROPONIN T: ABNORMAL NG/ML
TROPONIN T: NORMAL NG/ML
TROPONIN, HIGH SENSITIVITY: 28 NG/L (ref 0–14)
TROPONIN, HIGH SENSITIVITY: 29 NG/L (ref 0–14)
TROPONIN, HIGH SENSITIVITY: 32 NG/L (ref 0–14)
TROPONIN, HIGH SENSITIVITY: 38 NG/L (ref 0–14)
TROPONIN, HIGH SENSITIVITY: 40 NG/L (ref 0–14)
TROPONIN, HIGH SENSITIVITY: 49 NG/L (ref 0–14)
TROPONIN, HIGH SENSITIVITY: NORMAL NG/L (ref 0–14)
WBC # BLD: 12.3 K/UL (ref 3.5–11.3)
WBC # BLD: ABNORMAL 10*3/UL

## 2020-11-25 PROCEDURE — 80048 BASIC METABOLIC PNL TOTAL CA: CPT

## 2020-11-25 PROCEDURE — 6360000002 HC RX W HCPCS: Performed by: NURSE PRACTITIONER

## 2020-11-25 PROCEDURE — 2060000000 HC ICU INTERMEDIATE R&B

## 2020-11-25 PROCEDURE — 83735 ASSAY OF MAGNESIUM: CPT

## 2020-11-25 PROCEDURE — 97535 SELF CARE MNGMENT TRAINING: CPT

## 2020-11-25 PROCEDURE — 85379 FIBRIN DEGRADATION QUANT: CPT

## 2020-11-25 PROCEDURE — 2580000003 HC RX 258: Performed by: FAMILY MEDICINE

## 2020-11-25 PROCEDURE — 6370000000 HC RX 637 (ALT 250 FOR IP): Performed by: NURSE PRACTITIONER

## 2020-11-25 PROCEDURE — 94640 AIRWAY INHALATION TREATMENT: CPT

## 2020-11-25 PROCEDURE — APPSS45 APP SPLIT SHARED TIME 31-45 MINUTES: Performed by: NURSE PRACTITIONER

## 2020-11-25 PROCEDURE — 97110 THERAPEUTIC EXERCISES: CPT

## 2020-11-25 PROCEDURE — 99232 SBSQ HOSP IP/OBS MODERATE 35: CPT | Performed by: NURSE PRACTITIONER

## 2020-11-25 PROCEDURE — 94761 N-INVAS EAR/PLS OXIMETRY MLT: CPT

## 2020-11-25 PROCEDURE — 82330 ASSAY OF CALCIUM: CPT

## 2020-11-25 PROCEDURE — 85025 COMPLETE CBC W/AUTO DIFF WBC: CPT

## 2020-11-25 PROCEDURE — 83874 ASSAY OF MYOGLOBIN: CPT

## 2020-11-25 PROCEDURE — 36415 COLL VENOUS BLD VENIPUNCTURE: CPT

## 2020-11-25 PROCEDURE — 83605 ASSAY OF LACTIC ACID: CPT

## 2020-11-25 PROCEDURE — 82947 ASSAY GLUCOSE BLOOD QUANT: CPT

## 2020-11-25 PROCEDURE — 84484 ASSAY OF TROPONIN QUANT: CPT

## 2020-11-25 RX ORDER — ERGOCALCIFEROL 1.25 MG/1
50000 CAPSULE ORAL WEEKLY
Status: DISCONTINUED | OUTPATIENT
Start: 2020-11-25 | End: 2020-11-26 | Stop reason: HOSPADM

## 2020-11-25 RX ORDER — ERGOCALCIFEROL 1.25 MG/1
50000 CAPSULE ORAL WEEKLY
Qty: 8 CAPSULE | Refills: 0 | Status: SHIPPED | OUTPATIENT
Start: 2020-11-30

## 2020-11-25 RX ORDER — LEVOFLOXACIN 750 MG/1
750 TABLET ORAL DAILY
Qty: 7 TABLET | Refills: 0 | Status: SHIPPED | OUTPATIENT
Start: 2020-11-25 | End: 2020-12-02

## 2020-11-25 RX ADMIN — POTASSIUM CHLORIDE 10 MEQ: 7.46 INJECTION, SOLUTION INTRAVENOUS at 10:10

## 2020-11-25 RX ADMIN — IPRATROPIUM BROMIDE AND ALBUTEROL SULFATE 1 AMPULE: .5; 3 SOLUTION RESPIRATORY (INHALATION) at 06:11

## 2020-11-25 RX ADMIN — ATORVASTATIN CALCIUM 20 MG: 20 TABLET, FILM COATED ORAL at 10:14

## 2020-11-25 RX ADMIN — ERGOCALCIFEROL 50000 UNITS: 1.25 CAPSULE ORAL at 17:41

## 2020-11-25 RX ADMIN — POTASSIUM CHLORIDE 10 MEQ: 7.46 INJECTION, SOLUTION INTRAVENOUS at 05:35

## 2020-11-25 RX ADMIN — GLIPIZIDE 10 MG: 10 TABLET ORAL at 05:15

## 2020-11-25 RX ADMIN — IPRATROPIUM BROMIDE AND ALBUTEROL SULFATE 1 AMPULE: .5; 3 SOLUTION RESPIRATORY (INHALATION) at 10:03

## 2020-11-25 RX ADMIN — ENOXAPARIN SODIUM 40 MG: 40 INJECTION SUBCUTANEOUS at 10:13

## 2020-11-25 RX ADMIN — SODIUM CHLORIDE: 9 INJECTION, SOLUTION INTRAVENOUS at 20:40

## 2020-11-25 RX ADMIN — LEVOFLOXACIN 750 MG: 5 INJECTION, SOLUTION INTRAVENOUS at 23:00

## 2020-11-25 RX ADMIN — POTASSIUM CHLORIDE 10 MEQ: 7.46 INJECTION, SOLUTION INTRAVENOUS at 06:39

## 2020-11-25 RX ADMIN — MAGNESIUM SULFATE HEPTAHYDRATE 1 G: 1 INJECTION, SOLUTION INTRAVENOUS at 21:19

## 2020-11-25 RX ADMIN — DIPYRIDAMOLE 25 MG: 25 TABLET, FILM COATED ORAL at 10:14

## 2020-11-25 RX ADMIN — DIPYRIDAMOLE 25 MG: 25 TABLET, FILM COATED ORAL at 16:11

## 2020-11-25 RX ADMIN — SODIUM CHLORIDE: 9 INJECTION, SOLUTION INTRAVENOUS at 10:10

## 2020-11-25 RX ADMIN — POTASSIUM CHLORIDE 10 MEQ: 7.46 INJECTION, SOLUTION INTRAVENOUS at 04:36

## 2020-11-25 RX ADMIN — IPRATROPIUM BROMIDE AND ALBUTEROL SULFATE 1 AMPULE: .5; 3 SOLUTION RESPIRATORY (INHALATION) at 14:50

## 2020-11-25 RX ADMIN — MAGNESIUM SULFATE HEPTAHYDRATE 1 G: 1 INJECTION, SOLUTION INTRAVENOUS at 03:27

## 2020-11-25 RX ADMIN — ALOGLIPTIN 6.25 MG: 6.25 TABLET, FILM COATED ORAL at 10:13

## 2020-11-25 RX ADMIN — INSULIN LISPRO 2 UNITS: 100 INJECTION, SOLUTION INTRAVENOUS; SUBCUTANEOUS at 17:41

## 2020-11-25 RX ADMIN — INSULIN LISPRO 2 UNITS: 100 INJECTION, SOLUTION INTRAVENOUS; SUBCUTANEOUS at 12:35

## 2020-11-25 RX ADMIN — MAGNESIUM SULFATE HEPTAHYDRATE 1 G: 1 INJECTION, SOLUTION INTRAVENOUS at 19:40

## 2020-11-25 RX ADMIN — IPRATROPIUM BROMIDE AND ALBUTEROL SULFATE 1 AMPULE: .5; 3 SOLUTION RESPIRATORY (INHALATION) at 18:46

## 2020-11-25 RX ADMIN — DIPYRIDAMOLE 25 MG: 25 TABLET, FILM COATED ORAL at 21:10

## 2020-11-25 RX ADMIN — DONEPEZIL HYDROCHLORIDE 10 MG: 10 TABLET, FILM COATED ORAL at 21:10

## 2020-11-25 RX ADMIN — POTASSIUM CHLORIDE 20 MEQ: 20 TABLET, EXTENDED RELEASE ORAL at 10:14

## 2020-11-25 ASSESSMENT — PAIN SCALES - GENERAL
PAINLEVEL_OUTOF10: 0

## 2020-11-25 NOTE — PLAN OF CARE
Problem: Skin Integrity:  Goal: Absence of new skin breakdown  Description: Absence of new skin breakdown  11/25/2020 1352 by Kenneth Shore RN  Outcome: Met This Shift  11/25/2020 0059 by Kalpesh Alvarez RN  Outcome: Ongoing     Problem: Falls - Risk of:  Goal: Will remain free from falls  Description: Will remain free from falls  11/25/2020 1352 by Kenneth Shore RN  Outcome: Met This Shift  11/25/2020 0059 by Kalpesh Alvarez RN  Outcome: Ongoing  Goal: Absence of physical injury  Description: Absence of physical injury  11/25/2020 1352 by Kenneth Shore RN  Outcome: Met This Shift  11/25/2020 0059 by Kalpesh Alvarez RN  Outcome: Ongoing     Problem: Skin Integrity:  Goal: Will show no infection signs and symptoms  Description: Will show no infection signs and symptoms  11/25/2020 1352 by Kenneth Shore RN  Outcome: Ongoing  11/25/2020 0059 by Kalpesh Alvarez RN  Outcome: Ongoing     Problem: Breathing Pattern - Ineffective:  Goal: Ability to achieve and maintain a regular respiratory rate will improve  Description: Ability to achieve and maintain a regular respiratory rate will improve  11/25/2020 1352 by Kenneth Shore RN  Outcome: Ongoing  11/25/2020 0059 by Kalpesh Alvarez RN  Outcome: Ongoing

## 2020-11-25 NOTE — PROGRESS NOTES
Pulmonary Critical Care Progress Note  Glenys Lucas, PEYMAN-NADINE/Glenna Granado MD     Patient seen for the follow up of  Sepsis due to pneumonia [facility])     Subjective:  No significant overnight events noted. She is sitting up in the bedside chair, in no distress. She is not on any oxygen. She denies any shortness of breath, cough or chest pain. She is pleasantly confused. Examination:  Vitals: /68   Pulse 71   Temp 97.5 °F (36.4 °C) (Oral)   Resp 16   Ht 6' (1.829 m)   Wt 120 lb (54.4 kg)   SpO2 97%   BMI 16.27 kg/m²   General appearance: alert and cooperative with exam, pleasantly confused, up in chair  Neck: No JVD  Lungs: Moderate air exchange, no wheezing, rales or rhonchi  Heart: regular rate and rhythm, S1, S2 normal, no gallop  Abdomen: Soft, non tender, + BS  Extremities: no cyanosis or clubbing. No significant edema    LABs:  CBC:   Recent Labs     11/23/20  1324 11/24/20  0603   WBC 14.6* 18.4*   HGB 18.6* 14.7   HCT 58.4* 44.8    162     BMP:   Recent Labs     11/25/20  1020 11/25/20  1439    139   K 3.4* 3.7   CO2 23 23   BUN 12 13   CREATININE 0.53 0.54   LABGLOM >60 >60   GLUCOSE 133* 180*     LIVER PROFILE:  Recent Labs     11/23/20  1324   AST 22   ALT 15   LABALBU 4.6     Radiology:  11/24/2020      Impression:  · Lactic acidosis, improving  · Leukocytosis/sepsis  · Altered mental status/lethargy with history of dementia  · Subcentimeter pulmonary nodules, largest measuring 4 mm  · Former smoker  · Elevated D-dimer, CTA negative for PE  · Elevated CRP, ferritin and D-dimer, rule out COVID-19  · Hypercalcemia  · Diabetes mellitus with hyperglycemia  · JUAN on CKD  · Elevated troponin,? NSTEMI  · Alzheimer's, HLD, HTN  · ?  Primary hyperthyroidism    Recommendations:  · Continue IV fluids  · Continue IV Levaquin   · DuoNeb every 4 hours and as needed  · Albuterol and Ipratropium Q 4 hours and prn  · Incentive spirometry every hour while awake  · Monitor blood cultures, no growth x2 days  · Hyperthyroid work-up and blood sugar control per primary team  · Labs: CBC and BMP in am  · X-ray chest in a.m.  · DVT prophylaxis with low molecular weight heparin  · PT/OT  · Follow-up CT chest for pulmonary nodules as an outpatient  · Above assessment and plan will be reviewed with Dr. Joyce Anderson will be finalized following review by Dr. Olivia Blanco.   · Will follow with you      PEYMAN Chapin-CNP   Pulmonary Critical Care and Sleep Medicine,  Patient seen under the supervision of Omega Beltre MD, [facility]

## 2020-11-25 NOTE — PLAN OF CARE
Problem: Skin Integrity:  Goal: Will show no infection signs and symptoms  Description: Will show no infection signs and symptoms  Outcome: Ongoing  Goal: Absence of new skin breakdown  Description: Absence of new skin breakdown  Outcome: Ongoing     Problem: Falls - Risk of:  Goal: Will remain free from falls  Description: Will remain free from falls  Outcome: Ongoing  Goal: Absence of physical injury  Description: Absence of physical injury  Outcome: Ongoing     Problem: Breathing Pattern - Ineffective:  Goal: Ability to achieve and maintain a regular respiratory rate will improve  Description: Ability to achieve and maintain a regular respiratory rate will improve  Outcome: Ongoing

## 2020-11-25 NOTE — PROGRESS NOTES
Physical Therapy  Facility/Department: [facility] MED SURG  Daily Treatment Note  NAME: Leora Brittle  : 1940  MRN: [id number]    Date of Service: 2020    Discharge Recommendations:  Subacute/Skilled Nursing Facility        Assessment   Body structures, Functions, Activity limitations: Decreased functional mobility ; Decreased ADL status; Decreased strength;Decreased safe awareness;Decreased balance;Decreased cognition;Decreased endurance  Assessment: Pt with mod to max deficits of bed mobility, transfers, ambulation, balance, cognition, POOR safety awareness and endurance this session. Pt HIGH risk for falls & recommend SNF at D/C to return to baseline function and a safe D/C back to home environment. Pt requires continued PT to maximize independence with functional mobility, balance, safety awareness & activity tolerance. Activity Tolerance  Activity Tolerance: Patient limited by fatigue;Patient limited by endurance; Patient limited by cognitive status     Patient Diagnosis(es): The primary encounter diagnosis was Pneumonia due to organism. Diagnoses of Septicemia ([facility] and Hyperglycemia were also pertinent to this visit. has a past medical history of Dementia ([facility] [facility] Diabetes mellitus ([facility] Fracture, Hyperlipidemia, and Hypertension. has no past surgical history on file.     Restrictions  Restrictions/Precautions  Restrictions/Precautions: General Precautions, Fall Risk, Up as Tolerated  Required Braces or Orthoses?: No  Position Activity Restriction  Other position/activity restrictions: L UE IV, telemetry, dementia/confusion, up at tolerated, alarms, heel soff bed if unable to move LEs, assist Q 2 hrs if unable to turn self  Subjective   General  Chart Reviewed: Yes  Subjective  Subjective: pt very confused up in chair with chair alarm  Pain Screening  Patient Currently in Pain: Denies  Vital Signs  Patient Currently in Pain: Denies       Orientation     Cognition      Objective      Transfers  Comment:

## 2020-11-25 NOTE — PROGRESS NOTES
Present: No  Vital Signs  Patient Currently in Pain: Denies   Orientation  Orientation  Overall Orientation Status: Impaired  Orientation Level: Oriented to person;Disoriented to place; Disoriented to time;Disoriented to situation  Objective    ADL  Feeding: Setup; Increased time to complete;Verbal cueing(Pt req'd constant supervision/cueing to eat breakfast due to cognitive deficits, complete set up of all food and drink items (cutting food, opening containers/condiments, etc))  UE Dressing: Maximum assistance  LE Dressing: Maximum assistance;Dependent/Total  Toileting: Maximum assistance;Dependent/Total  Additional Comments: Writer assisted RN to change pt's brief and gown in supine position. Pt saturated brief, gown and bedding with urine and was completely unaware. Pt in supine position to get cleaned up and changed. Balance  Sitting Balance: Stand by assistance  Standing Balance: Minimal assistance  Bed mobility  Rolling to Left: Minimal assistance  Rolling to Right: Minimal assistance  Supine to Sit: Moderate assistance  Scooting: Moderate assistance  Comment: Max verbal and [place] cues for sequencing movements and staying on task. Transfers  Stand Step Transfers: Minimal assistance  Sit to stand: Minimal assistance  Stand to sit: Minimal assistance  Transfer Comments: Max verbal and tactile cues for sequencing movements, safety with walker and to stay on task due to cognitive deficits. Total assit with IV mgmt. Cognition  Overall Cognitive Status: Exceptions  Arousal/Alertness: Appropriate responses to stimuli;Delayed responses to stimuli;Inconsistent responses to stimuli  Following Commands: Inconsistently follows commands; Follows one step commands with increased time; Follows one step commands with repetition  Attention Span: Attends with cues to redirect; Difficulty attending to directions; Difficulty dividing attention  Memory: Decreased short term memory;Decreased recall of recent events;Decreased recall of biographical Information  Safety Judgement: Decreased awareness of need for assistance;Decreased awareness of need for safety  Problem Solving: Assistance required to implement solutions;Assistance required to generate solutions;Assistance required to identify errors made;Assistance required to correct errors made;Decreased awareness of errors  Insights: Not aware of deficits  Initiation: Requires cues for all  Sequencing: Requires cues for all     Perception  Overall Perceptual Status: Impaired  Initiation: Hand over hand to initiate tasks                                   Plan   Plan  Times per week: 4-5x/week 1x/day as anand  Current Treatment Recommendations: Strengthening, Balance Training, Functional Mobility Training, Safety Education & Training, Positioning, Cognitive/Perceptual Training, Cognitive Reorientation, Equipment Evaluation, Education, & procurement, Self-Care / ADL, Endurance Training, Neuromuscular Re-education, Patient/Caregiver Education & Training           AM-PAC Score     AM-PAC Inpatient Mobility without Stair Climbing Raw Score : 15 (11/25/20 1054)  AM-[facility] Inpatient without Stair Climbing T-Scale Score : 43.03 (11/25/20 1054)  Mobility Inpatient CMS 0-100% Score: 47.43 (11/25/20 1054)  Mobility Inpatient without Stair CMS G-Code Modifier : CK (11/25/20 1054)  AM-[facility] Inpatient Daily Activity Raw Score: 11 (11/25/20 1054)  AM-PAC Inpatient ADL T-Scale Score : 29.04 (11/25/20 1054)  ADL Inpatient CMS 0-100% Score: 70.42 (11/25/20 1054)  ADL Inpatient CMS G-Code Modifier : CL (11/25/20 1054)    Goals  Short term goals  Time Frame for Short term goals: by discharge, pt to demo  Short term goal 1: bed mob tasks with use of rail as needed to CG. Short term goal 2: UB ADL to min assist and LB ADL to mod assist with use of AD as needed. Short term goal 3: ADL transfers and functional mob with AD as needed to CG.   Short term goal 4: participation in [address] [place] [address] ther ex/act for 15 mins EOB to increase act anand/maintain functional use. Short term goal 5: toileting tasks with use of AD/grab bar and BSC as needed to mod assist.  Long term goals  Long term goal 1: Pt to stand with SBA and AD anand > 5 mins as able to reduce falls for selfcare. Long term goal 2: Caregivers to be I with pressure relief, BUE HEP, fall prevention and DME/AE and AD recommendations with use of handouts as needed.   Patient Goals   Patient goals : Pt unable to state due to cognitive deficits       Therapy Time   Individual Concurrent Group Co-treatment   Time In 1027         Time Out 1105         Minutes [id number] [address]

## 2020-11-25 NOTE — PROGRESS NOTES
[facility]  Office: [address], DO, Althea Worley, DO, Ольга Rodriguez, DO, Deepthi Scott Blood, DO, Marleny Simons MD, Jesusita Valdovinos MD, Beth Acevedo MD, Naheed Corley MD, Nahed Norris MD, Anastasiya Thomason MD, Amina Medellin MD, Maria Del Rosario Montiel MD, Jackelin De Jesus MD, Sylvester Rizvi, DO, Enoch Victoria MD, Carolyn Sarabia MD, Ismael Reagan DO, Avni Caro MD,  Finesse Nunez, DO, Sharon Ureña MD, Syeda Moura MD, Russell Edward, [facility], [facility], [facility], Mariia Masters, CNP, Latia Vizcarra, CNS, Naseem Woodward, CNP, Chaparro Herrera, CNP, Emeka Ramirez, CNP, Dionicia Frankel, CNP, Oliver Major, CNP, Janel Stevenson PA-C, Lazaro Edmonds, [facility], Amber Pedraza, CNP, Zander Jon, CNP, Avni Hess, CNP, Cecil Lerma, CNP, Xu Meza, [facility]   [facility]    Progress Note    11/25/2020    9:03 AM    Name:   Ce Luong  MRN:     [id number]     Acct:      [de-identified]   Room:   2011/2011-02  IP Day:  2  Admit Date:  11/23/2020 12:25 PM    PCP:   Margareth Manning MD  Code Status:  Full Code    Subjective:     C/C:   Chief Complaint   Patient presents with    Loss of Consciousness     Interval History Status: improved. Significant improvement in the last 24 hours. Glycemic control has gone well. Troponins trending down, likely type II ischemic demand secondary to sepsis with JUAN. Renal function has returned to baseline. Electrolyte correction was successful. Continued on IV antibiotics and hydration. Anticipate PT/OT recommendations. Patient will likely benefit from outpatient follow-up with endocrinology for PTH level. Brief History:     11/23 -admitted through the emergency department with sepsis secondary to community-acquired pneumonia. Also found to be hypercalcemic with an JUAN and hyperglycemic without ketoacidosis.     11/24 -condition improving with hydration, IV antibiotics, additional imaging ordered for PTH levels. Covid negative stepped out of isolation. 11/25 -condition continues to improve. Electrolyte correction has gone well. Recommend outpatient follow-up with endocrinology. Appreciate recommendations from PT/OT. Review of Systems:     Constitutional:  negative for chills, fevers, sweats  Respiratory:  negative for cough, dyspnea on exertion, shortness of breath, wheezing  Cardiovascular:  negative for chest pain, chest pressure/discomfort, lower extremity edema, palpitations  Gastrointestinal:  negative for abdominal pain, constipation, diarrhea, nausea, vomiting  Neurological:  negative for dizziness, headache    Medications: Allergies: Allergies   Allergen Reactions    Penicillins     Tramadol Nausea Only     Pt states she feels loopy and has loss of appetite        Current Meds:   Scheduled Meds:    atorvastatin  20 mg Oral Daily    donepezil  10 mg Oral Nightly    escitalopram  10 mg Oral Daily    glipiZIDE  10 mg Oral QAM AC    potassium chloride  20 mEq Oral Daily with breakfast    alogliptin  6.25 mg Oral Daily    dipyridamole  25 mg Oral TID    sodium chloride flush  10 mL Intravenous 2 times per day    enoxaparin  40 mg Subcutaneous Daily    ipratropium-albuterol  1 ampule Inhalation Q4H WA    insulin lispro  0-12 Units Subcutaneous TID WC    insulin lispro  0-6 Units Subcutaneous Nightly    levofloxacin  750 mg Intravenous Q48H     Continuous Infusions:    sodium chloride 100 mL/hr at 11/24/20 2012    dextrose       PRN Meds: sodium chloride flush, magnesium sulfate, potassium chloride, sodium chloride flush, acetaminophen **OR** acetaminophen, magnesium hydroxide, promethazine **OR** ondansetron, nicotine, albuterol, glucose, dextrose, glucagon (rDNA), dextrose    Data:     Past Medical History:   has a past medical history of Dementia ([facility] [address] Diabetes mellitus ([facility] [address] Fracture, Hyperlipidemia, and Hypertension. Social History:   reports that she has quit smoking. Recent Labs     11/23/20  1324 11/23/20  1555  11/23/20 2020 11/23/20  2151 11/24/20  1255 11/24/20  1656 11/24/20  2120 11/25/20  0642   PROT 7.9  --   --   --   --   --   --   --   --    LABALBU 4.6  --   --   --   --   --   --   --   --    AST 22  --   --   --   --   --   --   --   --    ALT 15  --   --   --   --   --   --   --   --    LDH  --  219*  --   --   --   --   --   --   --    ALKPHOS 123*  --   --   --   --   --   --   --   --    BILITOT 1.37*  --   --   --   --   --   --   --   --    POCGLU  --   --    < > 222* 175* 175* 135* 182* 110*    < > = values in this interval not displayed. ABG:No results found for: POCPH, PHART, PH, POCPCO2, TIZ5VIB, PCO2, POCPO2, PO2ART, PO2, POCHCO3, BVY2LYV, HCO3, NBEA, PBEA, BEART, BE, THGBART, THB, KGP2XAS, TBYI5ZHD, Q1HIHPGG, O2SAT, FIO2  Lab Results   Component Value Date/Time    SPECIAL LEFT AC, 6ML, [facility] 11/23/2020 02:10 PM    SPECIAL RIGHT AC, 6ML, [facility] 11/23/2020 02:10 PM     Lab Results   Component Value Date/Time    CULTURE NO GROWTH 2 DAYS 11/23/2020 02:10 PM    CULTURE NO GROWTH 2 DAYS 11/23/2020 02:10 PM       Radiology:  Ct Head Wo Contrast    Result Date: 11/23/2020  No acute intracranial abnormality. Ct Abdomen Pelvis W Iv Contrast Additional Contrast? None    Result Date: 11/23/2020  1. No pulmonary embolus. No acute pulmonary abnormality. 2. Noncalcified pulmonary nodules measuring up to 4 mm. 3. Extensive gastric wall thickening with adjacent stranding, concerning for gastritis. 4. Small free fluid in the pelvis, likely reactive. 5. Gas within the endometrium, indeterminate. This could be due to recent procedure, but infection/endometritis cannot be excluded. Clinical correlation is recommended. RECOMMENDATIONS: Fleischner Society guidelines for follow-up and management of incidentally detected pulmonary nodules: Multiple Solid Nodules: Nodule size less than 6 mm In a low-risk patient, no routine follow-up.  In a high-risk patient, optional CT at 12 months. - Low risk patients include individuals with minimal or absent history of smoking and other known risk factors. - High risk patients include individuals with a history or smoking or known risk factors. Radiology 2017 http://pubs. rsna.org/doi/full/10.1148/radiol. 2582592176     Xr Chest Portable    Result Date: 11/24/2020  No evidence of acute cardiopulmonary disease. Xr Chest Portable    Result Date: 11/23/2020  Extensive asymmetric left lung infiltrate, pneumonia the likely etiology Possible right upper lung parenchymal nodule. Repeat PA and apical lordotic views of the chest are suggested for further assessment when the patient is able cooperate for optimal imaging     Ct Chest Pulmonary Embolism W Contrast    Result Date: 11/23/2020  1. No pulmonary embolus. No acute pulmonary abnormality. 2. Noncalcified pulmonary nodules measuring up to 4 mm. 3. Extensive gastric wall thickening with adjacent stranding, concerning for gastritis. 4. Small free fluid in the pelvis, likely reactive. 5. Gas within the endometrium, indeterminate. This could be due to recent procedure, but infection/endometritis cannot be excluded. Clinical correlation is recommended. RECOMMENDATIONS: Fleischner Society guidelines for follow-up and management of incidentally detected pulmonary nodules: Multiple Solid Nodules: Nodule size less than 6 mm In a low-risk patient, no routine follow-up. In a high-risk patient, optional CT at 12 months. - Low risk patients include individuals with minimal or absent history of smoking and other known risk factors. - High risk patients include individuals with a history or smoking or known risk factors. Radiology 2017 http://pubs. rsna.org/doi/full/10.1148/radiol. 1262486852       Physical Examination:        General appearance:  alert, cooperative and no distress  Mental Status:  oriented to person, place and time and normal affect  Lungs:  clear to auscultation bilaterally, normal effort  Heart:  regular rate and rhythm, no murmur  Abdomen:  soft, nontender, nondistended, normal bowel sounds, no masses, hepatomegaly, splenomegaly  Extremities:  no edema, redness, tenderness in the calves  Skin:  no gross lesions, rashes, induration    Assessment:        Hospital Problems           Last Modified POA    * (Principal) Sepsis due to pneumonia ([facility]) 11/23/2020 Yes    Alzheimer's disease ([facility]) 11/23/2020 Yes    Hypoglycemia 11/23/2020 Yes    Community acquired pneumonia of left lower lobe of lung 11/23/2020 Yes    Hypertension 11/23/2020 Yes    Hyperlipidemia 11/23/2020 Yes    Diabetes mellitus ([facility]) 11/23/2020 Yes    Hyperglycemia 11/23/2020 Yes    Hypercalcemia 11/23/2020 Yes          Plan:        1. Continue IV hydration, calcium improved, blood pressure improved, lactic acid improved, D-dimer improved  2. Continue IV antibiotics for community-acquired pneumonia  3. Continue to monitor calcium levels  4. Will likely require outpatient follow-up with endocrinology for hyperparathyroidism  5. Continue glycemic control, correcting well  6. PT/OT and anticipate placement.     PEYMAN Massey NP  11/25/2020  9:03 AM

## 2020-11-26 ENCOUNTER — APPOINTMENT (OUTPATIENT)
Dept: GENERAL RADIOLOGY | Age: 80
DRG: 871 | End: 2020-11-26
Payer: MEDICARE

## 2020-11-26 VITALS
OXYGEN SATURATION: 100 % | WEIGHT: 122 LBS | RESPIRATION RATE: 15 BRPM | HEIGHT: 72 IN | DIASTOLIC BLOOD PRESSURE: 61 MMHG | SYSTOLIC BLOOD PRESSURE: 112 MMHG | BODY MASS INDEX: 16.52 KG/M2 | TEMPERATURE: 98.2 F | HEART RATE: 89 BPM

## 2020-11-26 LAB
ABSOLUTE EOS #: 0.15 K/UL (ref 0–0.44)
ABSOLUTE IMMATURE GRANULOCYTE: 0.04 K/UL (ref 0–0.3)
ABSOLUTE LYMPH #: 1.45 K/UL (ref 1.1–3.7)
ABSOLUTE MONO #: 0.38 K/UL (ref 0.1–1.2)
ANION GAP SERPL CALCULATED.3IONS-SCNC: 7 MMOL/L (ref 9–17)
BASOPHILS # BLD: 1 % (ref 0–2)
BASOPHILS ABSOLUTE: 0.05 K/UL (ref 0–0.2)
BUN BLDV-MCNC: 11 MG/DL (ref 8–23)
BUN/CREAT BLD: 24 (ref 9–20)
CALCIUM IONIZED: 1.24 MMOL/L (ref 1.13–1.33)
CALCIUM IONIZED: 1.24 MMOL/L (ref 1.13–1.33)
CALCIUM IONIZED: 1.27 MMOL/L (ref 1.13–1.33)
CALCIUM IONIZED: 1.29 MMOL/L (ref 1.13–1.33)
CALCIUM IONIZED: 1.31 MMOL/L (ref 1.13–1.33)
CALCIUM SERPL-MCNC: 8.3 MG/DL (ref 8.6–10.4)
CHLORIDE BLD-SCNC: 113 MMOL/L (ref 98–107)
CO2: 22 MMOL/L (ref 20–31)
CREAT SERPL-MCNC: 0.45 MG/DL (ref 0.5–0.9)
D-DIMER QUANTITATIVE: 2.66 MG/L FEU (ref 0–0.59)
DIFFERENTIAL TYPE: ABNORMAL
EOSINOPHILS RELATIVE PERCENT: 2 % (ref 1–4)
GFR AFRICAN AMERICAN: >60 ML/MIN
GFR NON-AFRICAN AMERICAN: >60 ML/MIN
GFR SERPL CREATININE-BSD FRML MDRD: ABNORMAL ML/MIN/{1.73_M2}
GFR SERPL CREATININE-BSD FRML MDRD: ABNORMAL ML/MIN/{1.73_M2}
GLUCOSE BLD-MCNC: 103 MG/DL (ref 65–105)
GLUCOSE BLD-MCNC: 78 MG/DL (ref 65–105)
GLUCOSE BLD-MCNC: 94 MG/DL (ref 65–105)
GLUCOSE BLD-MCNC: 96 MG/DL (ref 70–99)
HCT VFR BLD CALC: 33.8 % (ref 36.3–47.1)
HEMOGLOBIN: 10.9 G/DL (ref 11.9–15.1)
IMMATURE GRANULOCYTES: 1 %
LYMPHOCYTES # BLD: 17 % (ref 24–43)
MAGNESIUM: 1.8 MG/DL (ref 1.6–2.6)
MCH RBC QN AUTO: 29.8 PG (ref 25.2–33.5)
MCHC RBC AUTO-ENTMCNC: 32.2 G/DL (ref 28.4–34.8)
MCV RBC AUTO: 92.3 FL (ref 82.6–102.9)
MONOCYTES # BLD: 5 % (ref 3–12)
MYOGLOBIN: <21 NG/ML (ref 25–58)
NRBC AUTOMATED: 0 PER 100 WBC
PDW BLD-RTO: 13.4 % (ref 11.8–14.4)
PHOSPHORUS: 1.8 MG/DL (ref 2.6–4.5)
PLATELET # BLD: 121 K/UL (ref 138–453)
PLATELET ESTIMATE: ABNORMAL
PMV BLD AUTO: 11.2 FL (ref 8.1–13.5)
POTASSIUM SERPL-SCNC: 3.3 MMOL/L (ref 3.7–5.3)
RBC # BLD: 3.66 M/UL (ref 3.95–5.11)
RBC # BLD: ABNORMAL 10*6/UL
SEG NEUTROPHILS: 74 % (ref 36–65)
SEGMENTED NEUTROPHILS ABSOLUTE COUNT: 6.25 K/UL (ref 1.5–8.1)
SODIUM BLD-SCNC: 142 MMOL/L (ref 135–144)
TROPONIN INTERP: ABNORMAL
TROPONIN T: ABNORMAL NG/ML
TROPONIN, HIGH SENSITIVITY: 27 NG/L (ref 0–14)
TROPONIN, HIGH SENSITIVITY: 27 NG/L (ref 0–14)
TROPONIN, HIGH SENSITIVITY: 28 NG/L (ref 0–14)
TROPONIN, HIGH SENSITIVITY: 28 NG/L (ref 0–14)
WBC # BLD: 8.3 K/UL (ref 3.5–11.3)
WBC # BLD: ABNORMAL 10*3/UL

## 2020-11-26 PROCEDURE — 36415 COLL VENOUS BLD VENIPUNCTURE: CPT

## 2020-11-26 PROCEDURE — 71045 X-RAY EXAM CHEST 1 VIEW: CPT

## 2020-11-26 PROCEDURE — 6370000000 HC RX 637 (ALT 250 FOR IP): Performed by: NURSE PRACTITIONER

## 2020-11-26 PROCEDURE — 85025 COMPLETE CBC W/AUTO DIFF WBC: CPT

## 2020-11-26 PROCEDURE — 84100 ASSAY OF PHOSPHORUS: CPT

## 2020-11-26 PROCEDURE — 94761 N-INVAS EAR/PLS OXIMETRY MLT: CPT

## 2020-11-26 PROCEDURE — 94640 AIRWAY INHALATION TREATMENT: CPT

## 2020-11-26 PROCEDURE — 83874 ASSAY OF MYOGLOBIN: CPT

## 2020-11-26 PROCEDURE — 85379 FIBRIN DEGRADATION QUANT: CPT

## 2020-11-26 PROCEDURE — 6360000002 HC RX W HCPCS: Performed by: NURSE PRACTITIONER

## 2020-11-26 PROCEDURE — 84484 ASSAY OF TROPONIN QUANT: CPT

## 2020-11-26 PROCEDURE — 99232 SBSQ HOSP IP/OBS MODERATE 35: CPT | Performed by: INTERNAL MEDICINE

## 2020-11-26 PROCEDURE — 80048 BASIC METABOLIC PNL TOTAL CA: CPT

## 2020-11-26 PROCEDURE — 82947 ASSAY GLUCOSE BLOOD QUANT: CPT

## 2020-11-26 PROCEDURE — 83735 ASSAY OF MAGNESIUM: CPT

## 2020-11-26 PROCEDURE — 82330 ASSAY OF CALCIUM: CPT

## 2020-11-26 RX ORDER — LEVOFLOXACIN 5 MG/ML
750 INJECTION, SOLUTION INTRAVENOUS EVERY 24 HOURS
Status: DISCONTINUED | OUTPATIENT
Start: 2020-11-26 | End: 2020-11-26 | Stop reason: HOSPADM

## 2020-11-26 RX ADMIN — ENOXAPARIN SODIUM 40 MG: 40 INJECTION SUBCUTANEOUS at 09:50

## 2020-11-26 RX ADMIN — ALOGLIPTIN 6.25 MG: 6.25 TABLET, FILM COATED ORAL at 09:50

## 2020-11-26 RX ADMIN — POTASSIUM CHLORIDE 20 MEQ: 20 TABLET, EXTENDED RELEASE ORAL at 09:55

## 2020-11-26 RX ADMIN — GLIPIZIDE 10 MG: 10 TABLET ORAL at 09:55

## 2020-11-26 RX ADMIN — IPRATROPIUM BROMIDE AND ALBUTEROL SULFATE 1 AMPULE: .5; 3 SOLUTION RESPIRATORY (INHALATION) at 10:00

## 2020-11-26 RX ADMIN — DIPYRIDAMOLE 25 MG: 25 TABLET, FILM COATED ORAL at 09:51

## 2020-11-26 RX ADMIN — ATORVASTATIN CALCIUM 20 MG: 20 TABLET, FILM COATED ORAL at 09:50

## 2020-11-26 RX ADMIN — IPRATROPIUM BROMIDE AND ALBUTEROL SULFATE 1 AMPULE: .5; 3 SOLUTION RESPIRATORY (INHALATION) at 06:03

## 2020-11-26 RX ADMIN — DIPYRIDAMOLE 25 MG: 25 TABLET, FILM COATED ORAL at 13:20

## 2020-11-26 RX ADMIN — IPRATROPIUM BROMIDE AND ALBUTEROL SULFATE 1 AMPULE: .5; 3 SOLUTION RESPIRATORY (INHALATION) at 14:20

## 2020-11-26 NOTE — PROGRESS NOTES
Pulmonary Critical Care Progress Note  ANGELIQUE Nicole/Glenna Granado MD     Patient seen for the follow up of  Sepsis due to pneumonia [facility])     Subjective:  No significant overnight events noted. He is sitting up in the bed finishing her breakfast.  Her son is at bedside. She is sitting up in the bedside chair, in no distress. She is not on any oxygen. She denies any shortness of breath, cough or chest pain. She is pleasantly confused. Examination:  Vitals: BP (!) 103/59   Pulse 94   Temp 97.5 °F (36.4 °C) (Oral)   Resp 15   Ht 6' (1.829 m)   Wt 122 lb (55.3 kg)   SpO2 97%   BMI 16.55 kg/m²   General appearance: alert and cooperative with exam, pleasantly confused  Neck: No JVD  Lungs: Moderate air exchange, no wheezing, rales or rhonchi  Heart: regular rate and rhythm, S1, S2 normal, no gallop  Abdomen: Soft, non tender, + BS  Extremities: no cyanosis or clubbing. No significant edema    LABs:  CBC:   Recent Labs     11/25/20  1747 11/26/20  0605   WBC 12.3* 8.3   HGB 12.6 10.9*   HCT 40.3 33.8*   * 121*     BMP:   Recent Labs     11/25/20  1747 11/26/20  0605    142   K 3.6* 3.3*   CO2 22 22   BUN 16 11   CREATININE 0.56 0.45*   LABGLOM >60 >60   GLUCOSE 108* 96     LIVER PROFILE:  Recent Labs     11/23/20  1324   AST 22   ALT 15   LABALBU 4.6     Radiology:  11/26/2020       Impression:  · Lactic acidosis, improving  · Leukocytosis/sepsis  · Altered mental status/lethargy with history of dementia  · Subcentimeter pulmonary nodules, largest measuring 4 mm  · Former smoker  · Elevated D-dimer, CTA negative for PE  · Elevated CRP, ferritin and D-dimer,  COVID-19 negative  · Hypercalcemia  · Diabetes mellitus with hyperglycemia  · JUAN on CKD  · Elevated troponin,? NSTEMI  · Alzheimer's, HLD, HTN  · ?  Primary hyperthyroidism    Recommendations:  · Continue IV fluids per primary team  · Continue IV Levaquin   · DuoNeb every 4 hours and as needed  · Albuterol and Ipratropium Q 4 hours and prn  · Incentive spirometry every hour while awake  · Monitor blood cultures, no growth x3 days  · Hyperthyroid work-up and blood sugar control per primary team  · Electrolyte replacement per sliding scale  · Labs: CBC and BMP in am  · DVT prophylaxis with low molecular weight heparin  · PT/OT  · Follow-up CT chest for pulmonary nodules as an outpatient  · Discussed with patient's son  · Discussed with RN  · Above assessment and plan will be reviewed with Dr. Lennox Georgia will be finalized following review by Dr. Chandan Sommers.   · Will follow with you      Nannette Hodgkins, APRN-CNP   Pulmonary Critical Care and Sleep Medicine,  Patient seen under the supervision of Ena Councilman, MD, [facility]

## 2020-11-26 NOTE — PROGRESS NOTES
[facility]  Office: [address], DO, Anthony Parekhángel, DO, Jason Escalante, DO, Kymberly Estrada, DO, Milagros Lucas MD, Noe Leyva MD, Maria G Colmenares MD, Pricila Reyes MD, Trey Benton MD, Justine Fox MD, Nevin Montelongo MD, Noah Luis MD, Mbonu Fleet Cowden, MD, Azael Alfredo DO, Tracy Fair MD, Rose Yen MD, Carmella Garcia, DO, Italo Benito MD,  Hank Orellana DO, Carlyn Coleman MD, Edouard Michaels MD, Sarah Hawthorne, [facility], [facility], CNP, Cynthia Pepper, CNP, Cathy Pacheco, [facility], Harrison Yap, CNP, Damaris Ruby, CNP, James Mahmood, CNP, Manohar Avilez, CNP, Ja Campbell, CNP, Marii Chris PA-C, Nicole Pond, [facility], Lizett Williamson, CNP, Annette Bowers, CNP, Dane Cameron, CNP, Alyce De, CNP, Willie Rosenthal, [facility]   [address]    Progress Note    11/26/2020    7:57 AM    Name:   Evelyn Irizarry  MRN:     [id number]     Acct:      [de-identified]   Room:   2011/2011-02  IP Day:  3  Admit Date:  11/23/2020 12:25 PM    PCP:   Katherine Nunez MD  Code Status:  Full Code    Subjective:     C/C:   Chief Complaint   Patient presents with    Loss of Consciousness     Interval History Status: improved. Pt has no complaints of any kind. Labs continue to improve. Pt is to be discharged to home with home care per the family wishes. SNF is refused. She denies any cough, SOB or pain. However, she is confused at baseline. Brief History:     11/23 -admitted through the emergency department with sepsis secondary to community-acquired pneumonia. Also found to be hypercalcemic with an JUAN and hyperglycemic without ketoacidosis. 11/24 -condition improving with hydration, IV antibiotics, additional imaging ordered for PTH levels. Covid negative stepped out of isolation. 11/25 -condition continues to improve. Electrolyte correction has gone well. Recommend outpatient follow-up with endocrinology.   Appreciate recommendations from PT/OT. 11/26 - labs continue to improve. Pt is to be discharged to home with home care. Pt will require out pt work up for PTH evaluation. Review of Systems:     Constitutional:  negative for chills, fevers, sweats  Respiratory:  negative for cough, dyspnea on exertion, shortness of breath, wheezing  Cardiovascular:  negative for chest pain, chest pressure/discomfort, lower extremity edema, palpitations  Gastrointestinal:  negative for abdominal pain, constipation, diarrhea, nausea, vomiting  Neurological:  negative for dizziness, headache    Medications: Allergies: Allergies   Allergen Reactions    Penicillins     Tramadol Nausea Only     Pt states she feels loopy and has loss of appetite        Current Meds:   Scheduled Meds:    vitamin D  50,000 Units Oral Weekly    atorvastatin  20 mg Oral Daily    donepezil  10 mg Oral Nightly    glipiZIDE  10 mg Oral QAM AC    potassium chloride  20 mEq Oral Daily with breakfast    alogliptin  6.25 mg Oral Daily    dipyridamole  25 mg Oral TID    sodium chloride flush  10 mL Intravenous 2 times per day    enoxaparin  40 mg Subcutaneous Daily    ipratropium-albuterol  1 ampule Inhalation Q4H WA    insulin lispro  0-12 Units Subcutaneous TID WC    insulin lispro  0-6 Units Subcutaneous Nightly    levofloxacin  750 mg Intravenous Q48H     Continuous Infusions:    sodium chloride 100 mL/hr at 11/25/20 2040    dextrose       PRN Meds: sodium chloride flush, magnesium sulfate, potassium chloride, sodium chloride flush, acetaminophen **OR** acetaminophen, magnesium hydroxide, promethazine **OR** ondansetron, nicotine, albuterol, glucose, dextrose, glucagon (rDNA), dextrose    Data:     Past Medical History:   has a past medical history of Dementia ([facility] [address] Diabetes mellitus ([facility] [address] Fracture, Hyperlipidemia, and Hypertension. Social History:   reports that she has quit smoking.  She has never used smokeless tobacco. She reports that she does not drink alcohol or use drugs. Family History:   Family History   Family history unknown: Yes       Vitals:  BP (!) 103/59   Pulse 94   Temp 97.5 °F (36.4 °C) (Oral)   Resp 15   Ht 6' (1.829 m)   Wt 122 lb (55.3 kg)   SpO2 98%   BMI 16.55 kg/m²   Temp (24hrs), Av.7 °F (36.5 °C), Min:97.5 °F (36.4 °C), Max:98.1 °F (36.7 °C)    Recent Labs     20  1127 20  1616 20  2111 20  0621   POCGLU 152* 150* 130* 94       I/O (24Hr):     Intake/Output Summary (Last 24 hours) at 2020 0757  Last data filed at 2020 0522  Gross per 24 hour   Intake 2945 ml   Output --   Net 2945 ml       Labs:  Hematology:  Recent Labs     20  1555 20  0603 20  0605 20  1747 20  0605   WBC  --  18.4*  --  12.3* 8.3   RBC  --  4.87  --  4.25 3.66*   HGB  --  14.7  --  12.6 10.9*   HCT  --  44.8  --  40.3 33.8*   MCV  --  92.0  --  94.8 92.3   MCH  --  30.2  --  29.6 29.8   MCHC  --  32.8  --  31.3 32.2   RDW  --  13.4  --  13.7 13.4   PLT  --  162  --  129* 121*   MPV  --  11.0  --  11.6 11.2   CRP 4.4  --   --   --   --    DDIMER  --  19.72* 3.14*  --  2.66*     Chemistry:  Recent Labs     20  0656 20  1020 20  1439 20  1747 20  2228 20  0121 20  0605    142 139 141  --   --  142   K 3.5* 3.4* 3.7 3.6*  --   --  3.3*   * 113* 108* 110*  --   --  113*   CO2 22 23 23 22  --   --  22   GLUCOSE 131* 133* 180* 108*  --   --  96   BUN 16 12 13 16  --   --  11   CREATININE 0.55 0.53 0.54 0.56  --   --  0.45*   MG 1.6 1.5*  --   --   --   --  1.8   ANIONGAP 9 6* 8* 9  --   --  7*   LABGLOM >60 >60 >60 >60  --   --  >60   GFRAA >60 >60 >60 >60  --   --  >60   CALCIUM 9.5 9.0 9.0 9.0  --   --  8.3*   CAION  --  1.32 1.33 1.34* 1.31  --   --    TROPHS 40* 38* 32* 29* 28* 27* 28*   MYOGLOBIN <21* 22* <21* <21* <21* <21* <21*     Recent Labs     20  1324 20  1555  20  2120 20  0642 20  1127 11/25/20  1616 11/25/20  2111 11/26/20  0621   PROT 7.9  --   --   --   --   --   --   --   --    LABALBU 4.6  --   --   --   --   --   --   --   --    AST 22  --   --   --   --   --   --   --   --    ALT 15  --   --   --   --   --   --   --   --    LDH  --  219*  --   --   --   --   --   --   --    ALKPHOS 123*  --   --   --   --   --   --   --   --    BILITOT 1.37*  --   --   --   --   --   --   --   --    POCGLU  --   --    < > 182* 110* 152* 150* 130* 94    < > = values in this interval not displayed. ABG:No results found for: POCPH, PHART, PH, POCPCO2, HAG4DFO, PCO2, POCPO2, PO2ART, PO2, POCHCO3, RIF9KGF, HCO3, NBEA, PBEA, BEART, BE, THGBART, THB, HSF3ICQ, JBHM0EOV, P7SKRSFD, O2SAT, FIO2  Lab Results   Component Value Date/Time    SPECIAL LEFT AC, 6ML, [facility] 11/23/2020 02:10 PM    SPECIAL RIGHT AC, 6ML, [facility] 11/23/2020 02:10 PM     Lab Results   Component Value Date/Time    CULTURE NO GROWTH 2 DAYS 11/23/2020 02:10 PM    CULTURE NO GROWTH 2 DAYS 11/23/2020 02:10 PM       Radiology:  Ct Head Wo Contrast    Result Date: 11/23/2020  No acute intracranial abnormality. Ct Abdomen Pelvis W Iv Contrast Additional Contrast? None    Result Date: 11/23/2020  1. No pulmonary embolus. No acute pulmonary abnormality. 2. Noncalcified pulmonary nodules measuring up to 4 mm. 3. Extensive gastric wall thickening with adjacent stranding, concerning for gastritis. 4. Small free fluid in the pelvis, likely reactive. 5. Gas within the endometrium, indeterminate. This could be due to recent procedure, but infection/endometritis cannot be excluded. Clinical correlation is recommended. RECOMMENDATIONS: Fleischner Society guidelines for follow-up and management of incidentally detected pulmonary nodules: Multiple Solid Nodules: Nodule size less than 6 mm In a low-risk patient, no routine follow-up. In a high-risk patient, optional CT at 12 months.  - Low risk patients include individuals with minimal or absent history of smoking and other known risk factors. - High risk patients include individuals with a history or smoking or known risk factors. Radiology 2017 http://pubs. rsna.org/doi/full/10.1148/radiol. 8256025551     Xr Chest Portable    Result Date: 11/24/2020  No evidence of acute cardiopulmonary disease. Xr Chest Portable    Result Date: 11/23/2020  Extensive asymmetric left lung infiltrate, pneumonia the likely etiology Possible right upper lung parenchymal nodule. Repeat PA and apical lordotic views of the chest are suggested for further assessment when the patient is able cooperate for optimal imaging     Ct Chest Pulmonary Embolism W Contrast    Result Date: 11/23/2020  1. No pulmonary embolus. No acute pulmonary abnormality. 2. Noncalcified pulmonary nodules measuring up to 4 mm. 3. Extensive gastric wall thickening with adjacent stranding, concerning for gastritis. 4. Small free fluid in the pelvis, likely reactive. 5. Gas within the endometrium, indeterminate. This could be due to recent procedure, but infection/endometritis cannot be excluded. Clinical correlation is recommended. RECOMMENDATIONS: Fleischner Society guidelines for follow-up and management of incidentally detected pulmonary nodules: Multiple Solid Nodules: Nodule size less than 6 mm In a low-risk patient, no routine follow-up. In a high-risk patient, optional CT at 12 months. - Low risk patients include individuals with minimal or absent history of smoking and other known risk factors. - High risk patients include individuals with a history or smoking or known risk factors. Radiology 2017 http://pubs. rsna.org/doi/full/10.1148/radiol. 1902829717       Physical Examination:        General appearance:  alert, cooperative and no distress  Mental Status:  oriented to person, place and time and normal affect  Lungs:  clear to auscultation bilaterally, normal effort  Heart:  regular rate and rhythm, no murmur  Abdomen:  soft, nontender, nondistended, normal

## 2020-11-26 NOTE — DISCHARGE SUMMARY
be hypercalcemic with an JUAN and hyperglycemic without ketoacidosis. 11/24 -condition improving with hydration, IV antibiotics, additional imaging ordered for PTH levels. Covid negative stepped out of isolation. 11/25 -condition continues to improve. Electrolyte correction has gone well. Recommend outpatient follow-up with endocrinology. Appreciate recommendations from PT/OT. 11/26 - labs continue to improve. Pt is to be discharged to home with home care. Pt will require out pt work up for PTH evaluation, intact PTH low 2. 11.  CT soft tissue neck was negative. Calcium returned to normal with IVF hydration. Negative Covid test, con't Levaquin for 7 more days for Pneumonia. Pt is stable for discharge home with home care. Her son helps care for her, they are both agreeable to Home care. They refused SNF placement. Significant therapeutic interventions: Aggressive IV hydration for Hypercalcemia with dehydration. PT/OT. IV antibiotics for CAP.  Lab work investigation to establish baseline levels to initiate out pt evaluation of PTH levels     Significant Diagnostic Studies:   Labs / Micro:  CBC:   Lab Results   Component Value Date    WBC 8.3 11/26/2020    RBC 3.66 11/26/2020    HGB 10.9 11/26/2020    HCT 33.8 11/26/2020    MCV 92.3 11/26/2020    MCH 29.8 11/26/2020    MCHC 32.2 11/26/2020    RDW 13.4 11/26/2020     11/26/2020     BMP:    Lab Results   Component Value Date    GLUCOSE 96 11/26/2020     11/26/2020    K 3.3 11/26/2020     11/26/2020    CO2 22 11/26/2020    ANIONGAP 7 11/26/2020    BUN 11 11/26/2020    CREATININE 0.45 11/26/2020    BUNCRER 24 11/26/2020    CALCIUM 8.3 11/26/2020    LABGLOM >60 11/26/2020    GFRAA >60 11/26/2020    GFR      11/26/2020    GFR NOT REPORTED 11/26/2020     PT/INR:  No results found for: PTINR, PROTIME, INR  FLP:  No results found for: CHOL, TRIG, HDL  U/A:    Lab Results   Component Value Date    COLORU YELLOW 11/23/2020    TURBIDITY views of the chest are suggested for further assessment when the patient is able cooperate for optimal imaging     Ct Chest Pulmonary Embolism W Contrast    Result Date: 11/23/2020  1. No pulmonary embolus. No acute pulmonary abnormality. 2. Noncalcified pulmonary nodules measuring up to 4 mm. 3. Extensive gastric wall thickening with adjacent stranding, concerning for gastritis. 4. Small free fluid in the pelvis, likely reactive. 5. Gas within the endometrium, indeterminate. This could be due to recent procedure, but infection/endometritis cannot be excluded. Clinical correlation is recommended. RECOMMENDATIONS: Fleischner Society guidelines for follow-up and management of incidentally detected pulmonary nodules: Multiple Solid Nodules: Nodule size less than 6 mm In a low-risk patient, no routine follow-up. In a high-risk patient, optional CT at 12 months. - Low risk patients include individuals with minimal or absent history of smoking and other known risk factors. - High risk patients include individuals with a history or smoking or known risk factors. Radiology 2017 http://pubs. rsna.org/doi/full/10.1148/radiol. 1267061688       Consultations:    Consults:     Final Specialist Recommendations/Findings:   IP CONSULT TO HOSPITALIST  IP CONSULT TO CRITICAL CARE      The patient was seen and examined on day of discharge and this discharge summary is in conjunction with any daily progress note from day of discharge. Discharge plan:     Disposition: Home    Physician Follow Up:       [facility]  [address]  [place], [place]  P: [phone]  F: [phone]        [facility] Endocrinology  [place] [address] [address] [address] [phone]  Schedule an appointment as soon as possible for a visit in 1 week         Requiring Further Evaluation/Follow Up POST HOSPITALIZATION/Incidental Findings: Low levels of PTH, Intermittent hyperglycemia     Diet: renal diet and encourage fluids    Activity: As tolerated    Instructions to Patient: Take the antibiotics as directed. Take the vitamin D as scheduled. Follow up endocrinology as soon as able to discuss you PTH and your elevated BGL. Discharge Medications:      Medication List      START taking these medications    levoFLOXacin 750 MG tablet  Commonly known as:  Levaquin  Take 1 tablet by mouth daily for 7 days     vitamin D 1.25 MG (54739 UT) Caps capsule  Commonly known as:  ERGOCALCIFEROL  Take 1 capsule by mouth once a week  Start taking on:  November 30, 2020        Candace Lozano taking these medications    atorvastatin 20 MG tablet  Commonly known as:  LIPITOR     dipyridamole 25 MG tablet  Commonly known as:  PERSANTINE     donepezil 10 MG tablet  Commonly known as:  ARICEPT     glipiZIDE 5 MG tablet  Commonly known as:  GLUCOTROL     haloperidol 0.5 MG tablet  Commonly known as:  HALDOL     lisinopril 20 MG tablet  Commonly known as:  PRINIVIL;ZESTRIL     metFORMIN 1000 MG tablet  Commonly known as:  GLUCOPHAGE     potassium chloride 20 MEQ extended release tablet  Commonly known as:  KLOR-CON M  Take 1 tablet by mouth daily (with breakfast)     SITagliptin 25 MG tablet  Commonly known as:  [place] Goodpasture        STOP taking these medications    escitalopram 10 MG tablet  Commonly known as:  LEXAPRO     ketoconazole 2 % cream  Commonly known as:  NIZORAL           Where to Get Your Medications      These medications were sent to [address]  [address] [place] [address], [place] [address]    Phone:  [phone]   · levoFLOXacin 750 MG tablet  · vitamin D 1.25 MG (75296 UT) Caps capsule         No discharge procedures on file. Time Spent on discharge is  20 minutes  in patient examination, evaluation, counseling as well as medication reconciliation, prescriptions for required medications, discharge plan and follow up.     Electronically signed by   Harjit Luna

## 2020-11-26 NOTE — PLAN OF CARE
Problem: Skin Integrity:  Goal: Will show no infection signs and symptoms  Description: Will show no infection signs and symptoms  11/26/2020 0021 by Theron Mathew RN  Outcome: Ongoing  Goal: Absence of new skin breakdown  Description: Absence of new skin breakdown  11/26/2020 0021 by Theron Mathew RN  Outcome: Ongoing    Turn & reposition every 2 hours, skin care & jacqueline care prn brief changes     Problem: Falls - Risk of:  Goal: Will remain free from falls  Description: Will remain free from falls  11/26/2020 0021 by Theron Mathew RN  Outcome: Ongoing    Siderails up x 2  Hourly rounding  Call light in reach  Instructed to call for assist before attempting out of bed.   Remains free from falls and accidental injury at this time   Floor free from obstacles  Bed is locked and in lowest position  Adequate lighting provided  Bed alarm on, [place] star and Stay with Me signs posted   Goal: Absence of physical injury  Description: Absence of physical injury  11/26/2020 0021 by Theron Mathew RN  Outcome: Ongoing     Problem: Breathing Pattern - Ineffective:  Goal: Ability to achieve and maintain a regular respiratory rate will improve  Description: Ability to achieve and maintain a regular respiratory rate will improve  11/26/2020 0021 by Theron Mathew RN  Outcome: Ongoing     Problem: Nutrition Deficit:  Goal: Ability to achieve adequate nutritional intake will improve  Description: Ability to achieve adequate nutritional intake will improve  Outcome: Ongoing

## 2020-11-26 NOTE — CARE COORDINATION
BPCI-A Medical Bundle Patient:  Working DRG: sepsis-   Admitting Location: [address] [place] [address] Date: 11/23/2020  Date of Discharge:     Bundle End Date: +++    90-day post-acute outreach and tracking with qualifying DRG.
Social work: spoke to [facility] they will watch for the referral sent today when they get into work tomorrow. Phone: [phone] and Fax: [phone]. Lina and discharge summary along with demographics faxed.   Arlene mei
nHpredict Inpatient Visit    Patient/family seen: No: writer offsite    Provided patient/family with copy of nHpredict tool: No: writer offsite      All questions answered at the time of visit. Informed patient/family that the nHpredict is a tool, to be used as a guide, and should not alter their currently established discharge plan. Current discharge plan: HC with son     Collaboration completed with case management: Yes        Attached is the [facility] Predict Outcome report for Carole Hdz ,  1940 . Predict is recommending SNF with expected length of stay of 16.3 days, and projected CG hours of 5.25   post stay. Although she would benefit from daily skilled nursing and therapy to increase strength and functional independence, her cognition  is questionable to new learning. It appears she will be going home with her son and Art Tyson. Please call or email if you have any questions.       Have a good day, stay safe and healthy.     -JOE Zepeda, RN  Telephonic [facility]  M: [phone]
Plan for home with [facility] referral in they do not service [place]-they had referred to [facility] which is now [facility] [phone] is ok with them. Faxed referral to [phone]. The Plan for Transition of Care is related to the following treatment goals: skilled nursing pt/ot    The Patient and/or patient representative son was provided with a choice of provider and agrees   with the discharge plan. [x] Yes [] No    Freedom of choice list was provided with basic dialogue that supports the patient's individualized plan of care/goals, treatment preferences and shares the quality data associated with the providers.  [x] Yes [] No    Electronically signed by Quincy Chavez RN on 11/24/20 at 11:58 AM EST

## 2020-11-26 NOTE — PROGRESS NOTES
Pt discharged to home by car with son, pt son read and understood discharge instructions, pt will follow up with pcp, pt will have home care, [address] faxed, meds escribed to [facility] in [place], pt discharged in stable condition

## 2020-11-27 ENCOUNTER — CARE COORDINATION (OUTPATIENT)
Dept: CASE MANAGEMENT | Age: 80
End: 2020-11-27

## 2020-11-27 NOTE — CARE COORDINATION
[facility] Transitions Initial Follow Up Call- BP (sepsis- )   Call within 2 business days of discharge: Yes    Patient: Trudi Koch Patient : 1940   MRN: [id number]  Reason for Admission: pneumonnia, septicemia  Discharge Date: 20 RARS: Readmission Risk Score: 19      Last Discharge [facility]       Complaint Diagnosis Description Type Department Provider    20 Loss of Consciousness Pneumonia due to organism . .. ED to Hosp-Admission (Discharged) (ADMITTED) Gi Oseguera MD; Juan Salmon. .. Spoke with: pt son [address]     Call to pt son (pt lives with him. Pt has dementia)  States she does not appear to be short of breath (but states with the dementia she would not tell him, if she was)      Challenges to be reviewed by the provider   Additional needs identified to be addressed with provider No  none    Discussed COVID-19 related testing which was available at this time. Test results were negative. Patient informed of results, if available? Yes         Method of communication with provider : none    Advance Care Planning:   Does patient have an Advance Directive:  not on file; education provided. Was this a readmission? No  Patient stated reason for admission:   Patients top risk factors for readmission: functional cognitive ability and medical condition    Care Transition Nurse (CTN) contacted the family by telephone to perform post hospital discharge assessment. Verified name and  with family as identifiers. Provided introduction to self, and explanation of the CTN role. CTN reviewed discharge instructions, medical action plan and red flags with family who verbalized understanding. Family given an opportunity to ask questions and does not have any further questions or concerns at this time. Were discharge instructions available to patient? Yes.  Reviewed appropriate site of care based on symptoms and resources available to patient including: PCP, Home health, When to call 911 and ctn. The family agrees to contact the PCP office for questions related to their healthcare. Medication reconciliation was performed with family, who verbalizes understanding of administration of home medications. Advised obtaining a 90-day supply of all daily and as-needed medications. Covid Risk Education    Patient has following risk factors of: no known risk factors. Education provided regarding infection prevention, and signs and symptoms of COVID-19 and when to seek medical attention with family who verbalized understanding. Discussed exposure protocols and quarantine From CDC: Are you at higher risk for severe illness?   and given an opportunity for questions and concerns. The family agrees to contact the COVID-19 hotline [phone] or PCP office for questions related to COVID-19. For more information on steps you can take to protect yourself, see CDC's How to Protect Yourself     Discussed follow-up appointments. If no appointment was previously scheduled, appointment scheduling offered: Yes. Is follow up appointment scheduled within 7 days of discharge? No  Non-[facility] follow up appointment(s): pt son calling to schedule     Plan for follow-up call in 7-10 days based on severity of symptoms and risk factors. Plan for next call: routine   CTN provided contact information for future needs.             Non-face-to-face services provided:  Scheduled appointment with PCP-pt son calling to schedule   Obtained and reviewed discharge summary and/or continuity of care documents  Communication with home health agencies or other community services the patient is currently using-[facility] with home care requesting call back to see about start of care (pt son has not heard from them yet)   Assessment and support for treatment adherence and medication management-confirms new and DC meds    Care Transitions 24 Hour Call    Do you have any ongoing symptoms?:  No  Do you have a copy of your discharge instructions?:  Yes  Do you have all of your prescriptions and are they filled?:  Yes  Have you been contacted by a [employer] [address]?:  No  Have you scheduled your follow up appointment?:  No  Were you discharged with any Home Care or Post Acute Services:  Yes  Post Acute Services:  Home Health (Comment: [facility])  Do you feel like you have everything you need to keep you well at home?:  Yes  Care Transitions Interventions         Follow Up  No future appointments.     Candance Crate, RN

## 2020-11-29 LAB
CULTURE: NORMAL
CULTURE: NORMAL
Lab: NORMAL
Lab: NORMAL
SPECIMEN DESCRIPTION: NORMAL
SPECIMEN DESCRIPTION: NORMAL

## 2020-12-04 ENCOUNTER — CARE COORDINATION (OUTPATIENT)
Dept: CARE COORDINATION | Age: 80
End: 2020-12-04

## 2020-12-04 NOTE — CARE COORDINATION
[facility] Transitions Follow Up Call    2020    Patient: Carole Hdz  Patient : 1940   MRN: <[id number]>  Reason for Admission:   Discharge Date: 20 RARS: Readmission Risk Score: 23         Spoke with: Patient's son    Son states patient is doing better. Still tired with dry cough and SOB with exertion. No fever    Has HHC a few times a week. Patient has all medications is taking medications as directed and has no questions concerning medications at this time. Explained the BPCI-A program and that the patient is followed for 90 days after discharge. Expresses understanding. Will continue to follow. Cornel Bang LPN    [phone]  [facility] / [address] [place] [address] Transitions Subsequent and Final Call    Subsequent and Final Calls  Do you have any ongoing symptoms?:  Yes  Patient-reported symptoms:  Fatigue, Cough, Shortness of Breath  Have your medications changed?:  No  Do you have any questions related to your medications?:  No  Do you currently have any active services?:  Yes  Are you currently active with any services?:  Home Health  Do you have any needs or concerns that I can assist you with?:  No  Identified Barriers:  None  Care Transitions Interventions  Other Interventions: Follow Up  No future appointments.     Cornel Bang LPN

## 2020-12-18 ENCOUNTER — CARE COORDINATION (OUTPATIENT)
Dept: CASE MANAGEMENT | Age: 80
End: 2020-12-18

## 2020-12-30 ENCOUNTER — CARE COORDINATION (OUTPATIENT)
Dept: CARE COORDINATION | Age: 80
End: 2020-12-30

## 2021-01-12 ENCOUNTER — CARE COORDINATION (OUTPATIENT)
Dept: CASE MANAGEMENT | Age: 81
End: 2021-01-12

## 2021-01-12 NOTE — CARE COORDINATION
[address] [facility] Care [facility] ([facility]) Follow Up Call  Qualifying Diagnosis Sepsis related to Pulmonary Function. 1/12/2021  Patient Name:  Nica Weiss   YOB: 1940  Discharge Date:  11/26/20  RARS:  Readmission Risk Score: 19    PCP:  Wilfredo Payan MD  Third attempt to contact. Message left in compliance with HIPPA to contact PCP with immediate Healthcare need/questions/concerns. Care Transitions will continue to follow per [facility].  Eva Grossman RN, CTN  Follow Up Concerns:    No future appointments.

## 2021-02-12 ENCOUNTER — CARE COORDINATION (OUTPATIENT)
Dept: CASE MANAGEMENT | Age: 81
End: 2021-02-12

## 2021-02-12 NOTE — CARE COORDINATION
[facility] Trans[facility] Follow Up Call    2021    Patient: Thor Tolentino  Patient : 1940   MRN: <[id number]>  Reason for Admission:   Discharge Date: 20 RARS: Readmission Risk Score: 19         Attempted to reach patient by phone regarding follow up; BPCI-A. HIPAA compliant message left on patient's voicemail; CTN contact information provided.      Davey Natarajan RN

## 2021-02-24 ENCOUNTER — CARE COORDINATION (OUTPATIENT)
Dept: CASE MANAGEMENT | Age: 81
End: 2021-02-24

## 2021-02-24 NOTE — CARE COORDINATION
[facility] Trans[facility] Follow Up Call    2021    Patient: Shawna Johnson  Patient : 1940   MRN: <[id number]>  Reason for Admission: PNA  Discharge Date: 20 RARS: Readmission Risk Score: 19       Attempted to contact patient for final BPCI-A call. Contact information left to  requesting call back at the earliest convenience. CTN sign off. Follow Up  No future appointments.     Loy Palmer RN

## 2021-05-12 ENCOUNTER — APPOINTMENT (OUTPATIENT)
Dept: GENERAL RADIOLOGY | Age: 81
End: 2021-05-12
Payer: MEDICARE

## 2021-05-12 ENCOUNTER — APPOINTMENT (OUTPATIENT)
Dept: CT IMAGING | Age: 81
End: 2021-05-12
Payer: MEDICARE

## 2021-05-12 ENCOUNTER — HOSPITAL ENCOUNTER (OUTPATIENT)
Age: 81
Setting detail: OBSERVATION
Discharge: NURSING, REHAB OR LONG-TERM CARE | End: 2021-05-15
Attending: EMERGENCY MEDICINE | Admitting: SURGERY
Payer: MEDICARE

## 2021-05-12 ENCOUNTER — ANCILLARY PROCEDURE (OUTPATIENT)
Dept: EMERGENCY DEPT | Age: 81
End: 2021-05-12
Payer: MEDICARE

## 2021-05-12 ENCOUNTER — HOSPITAL ENCOUNTER (EMERGENCY)
Age: 81
Discharge: ANOTHER HOSPITAL | End: 2021-05-12
Attending: EMERGENCY MEDICINE
Payer: MEDICARE

## 2021-05-12 VITALS
OXYGEN SATURATION: 99 % | SYSTOLIC BLOOD PRESSURE: 160 MMHG | RESPIRATION RATE: 22 BRPM | WEIGHT: 120 LBS | HEART RATE: 75 BPM | TEMPERATURE: 98.5 F | BODY MASS INDEX: 16.27 KG/M2 | DIASTOLIC BLOOD PRESSURE: 90 MMHG

## 2021-05-12 DIAGNOSIS — S32.592A CLOSED FRACTURE OF MULTIPLE PUBIC RAMI, LEFT, INITIAL ENCOUNTER (HCC): ICD-10-CM

## 2021-05-12 DIAGNOSIS — S06.5XAA SDH (SUBDURAL HEMATOMA): ICD-10-CM
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
## 2021-05-12 DIAGNOSIS — S09.90XA INJURY OF HEAD, INITIAL ENCOUNTER: Primary | ICD-10-CM

## 2021-05-12 DIAGNOSIS — S06.5XAA SUBDURAL HEMATOMA: ICD-10-CM
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
## 2021-05-12 DIAGNOSIS — R41.82 ALTERED MENTAL STATUS, UNSPECIFIED ALTERED MENTAL STATUS TYPE: ICD-10-CM

## 2021-05-12 LAB
-: ABNORMAL
ABO/RH: NORMAL
ABSOLUTE EOS #: 0 K/UL (ref 0–0.4)
ABSOLUTE IMMATURE GRANULOCYTE: 0.09 K/UL (ref 0–0.3)
ABSOLUTE LYMPH #: 0.52 K/UL (ref 1–4.8)
ABSOLUTE MONO #: 0.52 K/UL (ref 0.2–0.8)
ALBUMIN SERPL-MCNC: 3.6 G/DL (ref 3.5–5.2)
ALBUMIN/GLOBULIN RATIO: ABNORMAL (ref 1–2.5)
ALLEN TEST: ABNORMAL
ALP BLD-CCNC: 164 U/L (ref 35–104)
ALT SERPL-CCNC: 18 U/L (ref 5–33)
AMORPHOUS: ABNORMAL
ANION GAP SERPL CALCULATED.3IONS-SCNC: 11 MMOL/L (ref 9–17)
ANION GAP SERPL CALCULATED.3IONS-SCNC: 13 MMOL/L (ref 9–17)
ANTIBODY SCREEN: NEGATIVE
ARM BAND NUMBER: NORMAL
AST SERPL-CCNC: 35 U/L
BACTERIA: ABNORMAL
BASOPHILS # BLD: 0 %
BASOPHILS ABSOLUTE: 0 K/UL (ref 0–0.2)
BILIRUB SERPL-MCNC: 0.85 MG/DL (ref 0.3–1.2)
BILIRUBIN DIRECT: 0.22 MG/DL
BILIRUBIN URINE: NEGATIVE
BILIRUBIN, INDIRECT: 0.63 MG/DL (ref 0–1)
BLOOD BANK SPECIMEN: ABNORMAL
BUN BLDV-MCNC: 20 MG/DL (ref 8–23)
BUN BLDV-MCNC: 22 MG/DL (ref 8–23)
BUN/CREAT BLD: 37 (ref 9–20)
CALCIUM SERPL-MCNC: 9.3 MG/DL (ref 8.6–10.4)
CARBOXYHEMOGLOBIN: 0.6 % (ref 0–5)
CASTS UA: ABNORMAL /LPF
CHLORIDE BLD-SCNC: 102 MMOL/L (ref 98–107)
CHLORIDE BLD-SCNC: 103 MMOL/L (ref 98–107)
CO2: 21 MMOL/L (ref 20–31)
CO2: 26 MMOL/L (ref 20–31)
COLOR: YELLOW
COMMENT UA: ABNORMAL
CREAT SERPL-MCNC: 0.49 MG/DL (ref 0.5–0.9)
CREAT SERPL-MCNC: 0.59 MG/DL (ref 0.5–0.9)
CRYSTALS, UA: ABNORMAL /HPF
DIFFERENTIAL TYPE: ABNORMAL
EOSINOPHILS RELATIVE PERCENT: 0 % (ref 1–4)
EPITHELIAL CELLS UA: ABNORMAL /HPF (ref 0–5)
ETHANOL PERCENT: <0.01 %
ETHANOL: <10 MG/DL
EXPIRATION DATE: NORMAL
FIO2: ABNORMAL
GFR AFRICAN AMERICAN: >60 ML/MIN
GFR AFRICAN AMERICAN: >60 ML/MIN
GFR NON-AFRICAN AMERICAN: >60 ML/MIN
GFR NON-AFRICAN AMERICAN: >60 ML/MIN
GFR SERPL CREATININE-BSD FRML MDRD: ABNORMAL ML/MIN/{1.73_M2}
GLOBULIN: ABNORMAL G/DL (ref 1.5–3.8)
GLUCOSE BLD-MCNC: 276 MG/DL (ref 70–99)
GLUCOSE BLD-MCNC: 326 MG/DL (ref 70–99)
GLUCOSE URINE: ABNORMAL
HCG QUALITATIVE: NEGATIVE
HCO3 VENOUS: 27.9 MMOL/L (ref 24–30)
HCT VFR BLD CALC: 41.8 % (ref 36.3–47.1)
HCT VFR BLD CALC: 45.3 % (ref 36.3–47.1)
HEMOGLOBIN: 13.8 G/DL (ref 11.9–15.1)
HEMOGLOBIN: 14.4 G/DL (ref 11.9–15.1)
IMMATURE GRANULOCYTES: 1 %
INR BLD: 1
INR BLD: 1.1
KETONES, URINE: ABNORMAL
LACTIC ACID: 2.9 MMOL/L (ref 0.5–2.2)
LEUKOCYTE ESTERASE, URINE: NEGATIVE
LYMPHOCYTES # BLD: 6 % (ref 24–44)
MCH RBC QN AUTO: 29.1 PG (ref 25.2–33.5)
MCH RBC QN AUTO: 29.4 PG (ref 25.2–33.5)
MCHC RBC AUTO-ENTMCNC: 31.8 G/DL (ref 28.4–34.8)
MCHC RBC AUTO-ENTMCNC: 33 G/DL (ref 28.4–34.8)
MCV RBC AUTO: 89.1 FL (ref 82.6–102.9)
MCV RBC AUTO: 91.7 FL (ref 82.6–102.9)
METHEMOGLOBIN: ABNORMAL % (ref 0–1.5)
MODE: ABNORMAL
MONOCYTES # BLD: 6 % (ref 1–7)
MUCUS: ABNORMAL
MYOGLOBIN: 788 NG/ML (ref 25–58)
NEGATIVE BASE EXCESS, VEN: ABNORMAL MMOL/L (ref 0–2)
NITRITE, URINE: NEGATIVE
NOTIFICATION TIME: ABNORMAL
NOTIFICATION: ABNORMAL
NRBC AUTOMATED: 0 PER 100 WBC
NRBC AUTOMATED: 0 PER 100 WBC
O2 DEVICE/FLOW/%: ABNORMAL
O2 SAT, VEN: 21.8 % (ref 60–85)
OTHER OBSERVATIONS UA: ABNORMAL
OXYHEMOGLOBIN: ABNORMAL % (ref 95–98)
PARTIAL THROMBOPLASTIN TIME: 21.8 SEC (ref 20.5–30.5)
PARTIAL THROMBOPLASTIN TIME: 28.5 SEC (ref 23.9–33.8)
PATIENT TEMP: 37
PCO2, VEN, TEMP ADJ: ABNORMAL MMHG (ref 39–55)
PCO2, VEN: 44.8 (ref 39–55)
PDW BLD-RTO: 12.4 % (ref 11.8–14.4)
PDW BLD-RTO: 12.6 % (ref 11.8–14.4)
PEEP/CPAP: ABNORMAL
PH UA: 6 (ref 5–8)
PH VENOUS: 7.41 (ref 7.32–7.42)
PH, VEN, TEMP ADJ: ABNORMAL (ref 7.32–7.42)
PLATELET # BLD: 225 K/UL (ref 138–453)
PLATELET # BLD: 226 K/UL (ref 138–453)
PLATELET ESTIMATE: ABNORMAL
PMV BLD AUTO: 10.9 FL (ref 8.1–13.5)
PMV BLD AUTO: 11 FL (ref 8.1–13.5)
PO2, VEN, TEMP ADJ: ABNORMAL MMHG (ref 30–50)
PO2, VEN: 16.9 (ref 30–50)
POSITIVE BASE EXCESS, VEN: 3.2 MMOL/L (ref 0–2)
POTASSIUM SERPL-SCNC: 3.9 MMOL/L (ref 3.7–5.3)
POTASSIUM SERPL-SCNC: 4.1 MMOL/L (ref 3.7–5.3)
PROTEIN UA: ABNORMAL
PROTHROMBIN TIME: 10.5 SEC (ref 9.1–12.3)
PROTHROMBIN TIME: 14.3 SEC (ref 11.5–14.2)
PSV: ABNORMAL
PT. POSITION: ABNORMAL
RBC # BLD: 4.69 M/UL (ref 3.95–5.11)
RBC # BLD: 4.94 M/UL (ref 3.95–5.11)
RBC # BLD: ABNORMAL 10*6/UL
RBC UA: ABNORMAL /HPF (ref 0–2)
RENAL EPITHELIAL, UA: ABNORMAL /HPF
RESPIRATORY RATE: ABNORMAL
SAMPLE SITE: ABNORMAL
SARS-COV-2, RAPID: NOT DETECTED
SEG NEUTROPHILS: 87 % (ref 36–66)
SEGMENTED NEUTROPHILS ABSOLUTE COUNT: 7.57 K/UL (ref 1.8–7.7)
SET RATE: ABNORMAL
SODIUM BLD-SCNC: 136 MMOL/L (ref 135–144)
SODIUM BLD-SCNC: 140 MMOL/L (ref 135–144)
SPECIFIC GRAVITY UA: 1.02 (ref 1–1.03)
SPECIMEN DESCRIPTION: NORMAL
TEXT FOR RESPIRATORY: ABNORMAL
TOTAL CK: 1352 U/L (ref 26–192)
TOTAL HB: ABNORMAL G/DL (ref 12–16)
TOTAL PROTEIN: 7.4 G/DL (ref 6.4–8.3)
TOTAL RATE: ABNORMAL
TRICHOMONAS: ABNORMAL
TROPONIN INTERP: ABNORMAL
TROPONIN T: ABNORMAL NG/ML
TROPONIN, HIGH SENSITIVITY: 17 NG/L (ref 0–14)
TURBIDITY: ABNORMAL
URINE HGB: ABNORMAL
UROBILINOGEN, URINE: NORMAL
VITAMIN D 25-HYDROXY: 19.5 NG/ML (ref 30–100)
VT: ABNORMAL
WBC # BLD: 8.7 K/UL (ref 3.5–11.3)
WBC # BLD: 9 K/UL (ref 3.5–11.3)
WBC # BLD: ABNORMAL 10*3/UL
WBC UA: ABNORMAL /HPF (ref 0–5)
YEAST: ABNORMAL

## 2021-05-12 PROCEDURE — 84703 CHORIONIC GONADOTROPIN ASSAY: CPT

## 2021-05-12 PROCEDURE — 86850 RBC ANTIBODY SCREEN: CPT

## 2021-05-12 PROCEDURE — 2580000003 HC RX 258: Performed by: EMERGENCY MEDICINE

## 2021-05-12 PROCEDURE — 72125 CT NECK SPINE W/O DYE: CPT

## 2021-05-12 PROCEDURE — G0378 HOSPITAL OBSERVATION PER HR: HCPCS

## 2021-05-12 PROCEDURE — 99225 PR SBSQ OBSERVATION CARE/DAY 25 MINUTES: CPT | Performed by: ORTHOPAEDIC SURGERY

## 2021-05-12 PROCEDURE — 73552 X-RAY EXAM OF FEMUR 2/>: CPT

## 2021-05-12 PROCEDURE — 86901 BLOOD TYPING SEROLOGIC RH(D): CPT

## 2021-05-12 PROCEDURE — 82805 BLOOD GASES W/O2 SATURATION: CPT

## 2021-05-12 PROCEDURE — 74176 CT ABD & PELVIS W/O CONTRAST: CPT

## 2021-05-12 PROCEDURE — 87635 SARS-COV-2 COVID-19 AMP PRB: CPT

## 2021-05-12 PROCEDURE — 80048 BASIC METABOLIC PNL TOTAL CA: CPT

## 2021-05-12 PROCEDURE — 85610 PROTHROMBIN TIME: CPT

## 2021-05-12 PROCEDURE — 84520 ASSAY OF UREA NITROGEN: CPT

## 2021-05-12 PROCEDURE — 80051 ELECTROLYTE PANEL: CPT

## 2021-05-12 PROCEDURE — 6810039001 HC L1 TRAUMA PRIORITY

## 2021-05-12 PROCEDURE — 82947 ASSAY GLUCOSE BLOOD QUANT: CPT

## 2021-05-12 PROCEDURE — 3209999900 POC US FAST ABDOMEN LIMITED

## 2021-05-12 PROCEDURE — 80076 HEPATIC FUNCTION PANEL: CPT

## 2021-05-12 PROCEDURE — 73080 X-RAY EXAM OF ELBOW: CPT

## 2021-05-12 PROCEDURE — 99284 EMERGENCY DEPT VISIT MOD MDM: CPT

## 2021-05-12 PROCEDURE — 93005 ELECTROCARDIOGRAM TRACING: CPT | Performed by: EMERGENCY MEDICINE

## 2021-05-12 PROCEDURE — 83874 ASSAY OF MYOGLOBIN: CPT

## 2021-05-12 PROCEDURE — 72190 X-RAY EXAM OF PELVIS: CPT

## 2021-05-12 PROCEDURE — 71045 X-RAY EXAM CHEST 1 VIEW: CPT

## 2021-05-12 PROCEDURE — 73630 X-RAY EXAM OF FOOT: CPT

## 2021-05-12 PROCEDURE — 82306 VITAMIN D 25 HYDROXY: CPT

## 2021-05-12 PROCEDURE — 3209999900 CT THORACIC SPINE TRAUMA RECONSTRUCTION

## 2021-05-12 PROCEDURE — 84484 ASSAY OF TROPONIN QUANT: CPT

## 2021-05-12 PROCEDURE — 81001 URINALYSIS AUTO W/SCOPE: CPT

## 2021-05-12 PROCEDURE — 83605 ASSAY OF LACTIC ACID: CPT

## 2021-05-12 PROCEDURE — 99283 EMERGENCY DEPT VISIT LOW MDM: CPT

## 2021-05-12 PROCEDURE — 82550 ASSAY OF CK (CPK): CPT

## 2021-05-12 PROCEDURE — 85025 COMPLETE CBC W/AUTO DIFF WBC: CPT

## 2021-05-12 PROCEDURE — 82565 ASSAY OF CREATININE: CPT

## 2021-05-12 PROCEDURE — 6370000000 HC RX 637 (ALT 250 FOR IP): Performed by: STUDENT IN AN ORGANIZED HEALTH CARE EDUCATION/TRAINING PROGRAM

## 2021-05-12 PROCEDURE — 85730 THROMBOPLASTIN TIME PARTIAL: CPT

## 2021-05-12 PROCEDURE — 86900 BLOOD TYPING SEROLOGIC ABO: CPT

## 2021-05-12 PROCEDURE — G0480 DRUG TEST DEF 1-7 CLASSES: HCPCS

## 2021-05-12 PROCEDURE — 70450 CT HEAD/BRAIN W/O DYE: CPT

## 2021-05-12 PROCEDURE — 85027 COMPLETE CBC AUTOMATED: CPT

## 2021-05-12 PROCEDURE — 3209999900 CT LUMBAR SPINE TRAUMA RECONSTRUCTION

## 2021-05-12 RX ORDER — DONEPEZIL HYDROCHLORIDE 10 MG/1
10 TABLET, FILM COATED ORAL NIGHTLY
Status: DISCONTINUED | OUTPATIENT
Start: 2021-05-12 | End: 2021-05-15 | Stop reason: HOSPADM

## 2021-05-12 RX ORDER — OXYCODONE HYDROCHLORIDE 5 MG/1
2.5 TABLET ORAL EVERY 6 HOURS PRN
Status: DISCONTINUED | OUTPATIENT
Start: 2021-05-12 | End: 2021-05-13

## 2021-05-12 RX ORDER — SODIUM CHLORIDE 9 MG/ML
25 INJECTION, SOLUTION INTRAVENOUS PRN
Status: DISCONTINUED | OUTPATIENT
Start: 2021-05-12 | End: 2021-05-15 | Stop reason: HOSPADM

## 2021-05-12 RX ORDER — SODIUM CHLORIDE 0.9 % (FLUSH) 0.9 %
5-40 SYRINGE (ML) INJECTION PRN
Status: DISCONTINUED | OUTPATIENT
Start: 2021-05-12 | End: 2021-05-15 | Stop reason: HOSPADM

## 2021-05-12 RX ORDER — LISINOPRIL 10 MG/1
20 TABLET ORAL DAILY
Status: DISCONTINUED | OUTPATIENT
Start: 2021-05-12 | End: 2021-05-15 | Stop reason: HOSPADM

## 2021-05-12 RX ORDER — SODIUM CHLORIDE, SODIUM LACTATE, POTASSIUM CHLORIDE, CALCIUM CHLORIDE 600; 310; 30; 20 MG/100ML; MG/100ML; MG/100ML; MG/100ML
INJECTION, SOLUTION INTRAVENOUS CONTINUOUS
Status: DISCONTINUED | OUTPATIENT
Start: 2021-05-13 | End: 2021-05-12

## 2021-05-12 RX ORDER — ACETAMINOPHEN 500 MG
1000 TABLET ORAL EVERY 8 HOURS SCHEDULED
Status: DISCONTINUED | OUTPATIENT
Start: 2021-05-12 | End: 2021-05-14

## 2021-05-12 RX ORDER — ATORVASTATIN CALCIUM 20 MG/1
20 TABLET, FILM COATED ORAL DAILY
Status: DISCONTINUED | OUTPATIENT
Start: 2021-05-12 | End: 2021-05-15 | Stop reason: HOSPADM

## 2021-05-12 RX ORDER — ONDANSETRON 2 MG/ML
4 INJECTION INTRAMUSCULAR; INTRAVENOUS EVERY 6 HOURS PRN
Status: DISCONTINUED | OUTPATIENT
Start: 2021-05-12 | End: 2021-05-15 | Stop reason: HOSPADM

## 2021-05-12 RX ORDER — METHOCARBAMOL 500 MG/1
750 TABLET, FILM COATED ORAL 4 TIMES DAILY
Status: DISCONTINUED | OUTPATIENT
Start: 2021-05-12 | End: 2021-05-13

## 2021-05-12 RX ORDER — POLYETHYLENE GLYCOL 3350 17 G/17G
17 POWDER, FOR SOLUTION ORAL DAILY PRN
Status: DISCONTINUED | OUTPATIENT
Start: 2021-05-12 | End: 2021-05-13

## 2021-05-12 RX ORDER — SODIUM CHLORIDE 0.9 % (FLUSH) 0.9 %
5-40 SYRINGE (ML) INJECTION EVERY 12 HOURS SCHEDULED
Status: DISCONTINUED | OUTPATIENT
Start: 2021-05-12 | End: 2021-05-15 | Stop reason: HOSPADM

## 2021-05-12 RX ORDER — 0.9 % SODIUM CHLORIDE 0.9 %
500 INTRAVENOUS SOLUTION INTRAVENOUS ONCE
Status: COMPLETED | OUTPATIENT
Start: 2021-05-12 | End: 2021-05-12

## 2021-05-12 RX ORDER — DIPYRIDAMOLE 25 MG
25 TABLET ORAL 3 TIMES DAILY
Status: DISCONTINUED | OUTPATIENT
Start: 2021-05-12 | End: 2021-05-15 | Stop reason: HOSPADM

## 2021-05-12 RX ORDER — GABAPENTIN 600 MG/1
300 TABLET ORAL 3 TIMES DAILY
Status: DISCONTINUED | OUTPATIENT
Start: 2021-05-12 | End: 2021-05-13

## 2021-05-12 RX ADMIN — DONEPEZIL HYDROCHLORIDE 10 MG: 10 TABLET, FILM COATED ORAL at 23:03

## 2021-05-12 RX ADMIN — METHOCARBAMOL 750 MG: 500 TABLET ORAL at 23:03

## 2021-05-12 RX ADMIN — ACETAMINOPHEN 1000 MG: 500 TABLET ORAL at 23:02

## 2021-05-12 RX ADMIN — GABAPENTIN 300 MG: 600 TABLET ORAL at 23:03

## 2021-05-12 RX ADMIN — SODIUM CHLORIDE 500 ML: 0.9 INJECTION, SOLUTION INTRAVENOUS at 13:24

## 2021-05-12 ASSESSMENT — PAIN SCALES - GENERAL: PAINLEVEL_OUTOF10: 7

## 2021-05-12 NOTE — ED NOTES
Bed: 23  Expected date: 5/12/21  Expected time: 11:55 AM  Means of arrival: [facility]  Comments:  [facility]  05/12/21 3940

## 2021-05-12 NOTE — H&P
TRAUMA HISTORY AND PHYSICAL EXAMINATION    PATIENT NAME: Gustavo Norman  YOB: 1940  MEDICAL RECORD NO. [id number]   DATE: 5/12/2021  PRIMARY CARE PHYSICIAN: Umang Vicente  PATIENT EVALUATED AT THE REQUEST OF DR: Sue Rodrigues   []Trauma Alert     [x] Trauma Priority     []Trauma Consult. IMPRESSION:     Patient Active Problem List   Diagnosis    [facility]    Hypokalemia    Hypoglycemia    Community acquired pneumonia of left lower lobe of lung    Sepsis due to pneumonia ([facility]    Hypertension    Hyperlipidemia    Diabetes mellitus ([facility]    Hyperglycemia    Hypercalcemia    Pneumonia due to organism    Fall from standing       MEDICAL DECISION MAKING AND PLAN:     [employer]   - PT 14.3/INR1. 1   - Lactic Acid 2.9   - Troponin 17 (basline 30s)    CT Head C/T/L + C/A/P   - FU XR  - FU labs  - PT/OT  - Admit to med/surg with tele    L pubic rami fx    - FU Ortho     ? 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  female   Patient information was obtained from EMS personnel. History/Exam limitations: dementia.   Patient presented to the Emergency Department by ambulance where the patient received cervical collar prior to arrival.  Injury Date: 5/12/2021   Approximate Injury Time:  Unknown        Transport mode:   [x]Ambulance      [] Helicopter     []Car       [] Other  Referring Hospital: [address], (e.g., home, farm, industry, street)  Specific Details of Location (e.g., bedroom, kitchen, garage): Home  Type of Residence (if occurred in home setting) (e.g., apartment, mobile home, single family home):  Unknown    MECHANISM OF INJURY    [] Motor Vehicle Collision   Specific vehicle type involved (e.g., sedan, minivan, SUV, pickup truck):   Collision with (e.g., type of vehicle, building, barn, ditch, tree):     Type of collision  [] Single Vehicle Collision  []Multiple Vehicle Collision  [] unknown collision type    Mechanism considerations  [] Fatality in Same Vehicle      []Ejected       []Rollover          []Extricated    Internal Compartment   []                      []Passenger:      []Front Seat        []Rear Seat     Personal Restraints  [] Unrestrained   []Lap Belt Only Restrained   [] Shoulder Belt Only Restrained  [] 3 Point Restrained  [] unknown     Air Bags  [] Front Air Bag  []Side Air Bag  []Curtain Airbag []Clikthrough Not Deployed      Pediatric Consideration:      [] Booster Seat  []Infant Car Seat  [] Child Car Seat      [] Motorcycle Collision   Wearing Helmet     []Yes     []No    []Unknown    [] Bicycle Collision Wearing Helmet     []Yes     []No    []Unknown    [] Pedestrian Struck         [] Fall    []From Standing     []From Height  Ft     []Down Stairs ___steps    [] Assault    [] Gunshot  Specify caliber / type of gun: ____________________________    [] Stabbing  Specify weapon type, size: _____________________________    [] Burn  []Flame   []Scald   []Electrical   []Chemical  []Inhalation   []House fire    [] Other ______________________________________________________    [] Other protective devices used / worn ___________________________    HISTORY:     Be Santizo is a 68 y.o. female that presented to the Emergency Department,         Loss of Consciousness []No   []Yes Duration(min)       [] Unknown     Total Fluids Given Prior To Arrival  mL    MEDICATIONS:   []  None     []  Information not available due to exam limitations documented above  Prior to Admission medications    Medication Sig Start Date End Date Taking? Authorizing Provider   vitamin D (ERGOCALCIFEROL) 1.25 MG (02110 UT) CAPS capsule Take 1 capsule by mouth once a week 11/30/20   PEYMAN Manriquez NP   haloperidol (HALDOL) 0.5 MG tablet Take 0.5-1 mg by mouth nightly as needed (sleep)    Historical Provider, MD   dipyridamole (PERSANTINE) 25 MG tablet Take 25 mg by mouth 3 times daily     Historical Provider, MD   SITagliptin (JANUVIA) 25 MG tablet Take 25 mg by mouth daily    Historical Provider, MD   potassium chloride (KLOR-CON M) 20 MEQ extended release tablet Take 1 tablet by mouth daily (with breakfast) 5/30/17   Naida Oliver MD   atorvastatin (LIPITOR) 20 MG tablet Take 20 mg by mouth daily    Historical Provider, MD   donepezil (ARICEPT) 10 MG tablet Take 10 mg by mouth nightly    Historical Provider, MD   glipiZIDE (GLUCOTROL) 5 MG tablet Take 5-10 mg by mouth See Admin Instructions Indications: Takes 10mg AM and 5mg PM     Historical Provider, MD   lisinopril (PRINIVIL;ZESTRIL) 20 MG tablet Take 20 mg by mouth daily    Historical Provider, MD   metFORMIN (GLUCOPHAGE) 1000 MG tablet Take 1,000 mg by mouth 2 times daily (with meals)    Historical Provider, MD       ALLERGIES:   []  None    []   Information not available due to exam limitations documented above   Penicillins and Tramadol    PAST MEDICAL HISTORY: []  None   []   Information not available due to exam limitations documented above    has a past medical history of Dementia ([facility] [address] Diabetes mellitus ([facility] [address] Fracture, Hyperlipidemia, and Hypertension. has no past surgical history on file. FAMILY HISTORY   []   Information not available due to exam limitations documented above    Family history is unknown by patient. SOCIAL HISTORY  []   Information not available due to exam limitations documented above     reports that she has quit smoking.  She has never used smokeless tobacco.   reports no history of alcohol use. reports no history of drug use. PERTINENT SYSTEMIC REVIEW:    []   Information not available due to exam limitations documented above    ROS limmited due to dementia    PHYSICAL EXAMINATION:     [address] TO ARRIVAL     [x]No        []Yes      Variable  Score   Variable  Score  Eye opening [x]Spontaneous 4 Verbal  [x]Oriented  5     []To voice  3   []Confused  4    []To pain  2   []Inapp words  3    []None  1   []Incomp words 2       []None  1   Motor   [x]Obeys  6    []Localizes pain 5    []Withdraws(pain) 4    []Flexion(pain) 3  []Extension(pain) 2    []None  1     GCS Total = 15    PHYSICAL EXAMINATION    VITAL SIGNS:   Vitals:    05/12/21 2101   BP: (!) 142/78   Pulse: 81   Resp:    SpO2: 93%     General appearance: alert, underweight, malnourished   Head: Normocephalic, without obvious abnormality, atraumatic  Eyes: conjunctivae/corneas clear. PERRL, EOM's intact. Ears: normal TM's and external ear, canals both ears, no hemotympanum  Nose: Nares normal. Septum midline. Mucosa normal. No drainage or sinus tenderness. Neck: No midline spinal tenderness  Lungs: clear to auscultation bilaterally  Chest wall: no tenderness  Heart: regular rate and rhythm, S1, S2 normal, no murmur, click, rub or gallop  Abdomen: soft, non tender  Spine: tenderness throughout entire spine  Extremities: abrasions to bilateral elbows/hips  Pulses: 2+ and symmetric  Skin: Skin color, texture, turgor normal. No rashes or lesions  Neurologic: Not oriented to person, place or time. Does not know what happened. Unclear what pts baseline is. Following commands. FOCUSED ABDOMINAL SONOGRAM FOR TRAUMA (FAST): A fair  quality examination was performed by Dr. Cris Ding and representative images were obtained.     [x] No free fluid in the abdomen   [] Free fluid in RUQ   [] Free fluid in LUQ  [] Free fluid in Pelvis  [] Pericardial fluid  [] Other:        RADIOLOGY  CT CHEST ABDOMEN PELVIS WO CONTRAST   Final Result   1. No acute thoracic traumatic abnormality. 2. Acute mildly displaced nonangulated left superior inferior pubic rami   fractures. Normal bilateral hip alignment with no acute proximal femur   fracture. 3. No acute intra-abdominal abnormality. CT THORACIC SPINE TRAUMA RECONSTRUCTION   Final Result   Mild remote compression fracture T2. No acute disease otherwise. CT LUMBAR SPINE TRAUMA RECONSTRUCTION   Final Result   No acute osseous abnormality within the lumbar spine. Moderate degenerative changes of lumbar spine at L2-L3, L3-L4 and L4-L5 with   posterior osteophytic ridging.          US Ed Fast Abdomen Limited   Final Result      XR PELVIS (MIN 3 VIEWS)    (Results Pending)   XR FEMUR RIGHT (MIN 2 VIEWS)    (Results Pending)   XR ELBOW RIGHT (MIN 3 VIEWS)    (Results Pending)         LABS    Labs Reviewed   TRAUMA PANEL - Abnormal; Notable for the following components:       Result Value    CREATININE 0.49 (*)     Glucose 276 (*)     pO2, Efrain 16.9 (*)     Positive Base Excess, Efrain 3.2 (*)     O2 Sat, Efrain 21.8 (*)     All other components within normal limits   BASIC METABOLIC PANEL W/ REFLEX TO MG FOR LOW K   CBC WITH AUTO DIFFERENTIAL   CK   MYOGLOBIN, SERUM   VITAMIN D 25 HYDROXY   TYPE AND SCREEN         Gerda Rasmussen DO  5/12/21, 9:24 PM

## 2021-05-12 NOTE — FLOWSHEET NOTE
[facility]     Emergency/Trauma Note    PATIENT NAME: Alicia Lopez    Shift date: 5/12/21  Shift day: Wednesday   Shift # 1    Room # 14/14   Name: Alicia Lopez            Age: 68 y.o. Gender: female          Yazdanism: [address] [place] [address] [address] [place] [employer]: Unknown  Trauma/Incident type: Adult Trauma Priority  Admit Date & Time: 5/12/2021  3:54 PM    ADVANCE DIRECTIVES IN CHART? No    NAME OF DECISION MAKER: Unknown    RELATIONSHIP OF DECISION MAKER TO PATIENT: Unknown    PATIENT/EVENT DESCRIPTION:  Alicia Lopez is a 68 y.o. female who arrived via ground ambulance as transfer from [facility]. Per report the patient has been falling frequently at home and her son found her on the floor this morning. Per report the patient suffers from dementia. She was diagnosed with a brain bleed at [facility] and transferred to [facility]. V's. Pt to be admitted to 14/14. SPIRITUAL ASSESSMENT/INTERVENTION:     05/12/21 1706   Encounter Summary   Services provided to: Patient   Referral/Consult From: Multi-disciplinary team   Support System Children   Place of Hoahaoism Unknown   Contact Latter-day No   Continue Visiting   (5/12/21)   Complexity of Encounter Low   Length of Encounter 15 minutes   Spiritual Assessment Completed Yes   Crisis   Type Trauma   Assessment Approachable;Passive   Intervention Active listening;Nurtured hope;Sustaining presence/ Ministry of presence   Outcome Acceptance;Comfort     I spoke briefly with the patient who smiled but was very confused. I could not understand her speech. I told her that I was the  but she did not seem to understand. I asked if I could pray for her and she smiled and shook her head. I told her that we would be waiting for her son to arrive and that we will bring him back to be with her as soon as he gets here. PATIENT BELONGINGS:  No belongings noted    ANY BELONGINGS OF SIGNIFICANT VALUE NOTED:  No.    REGISTRATION STAFF NOTIFIED?   Yes    WHAT IS YOUR SPIRITUAL CARE PLAN FOR THIS PATIENT?:  Chaplains are available for further spiritual and emotional support as requested by the family.        Electronically signed by Elissa Schaffer, on 5/12/2021 at 5:19 PM.  Dann Lin  [phone]

## 2021-05-12 NOTE — CONSULTS
Orthopedic Surgery Consult  (Dr. Azael iKrby)                   CC/Reason for consult:  R superior rami fx, recurrent falls    HPI:    The patient is a 68 y.o. female patient fell onto right hip earlier today with subsequent right groin pain. Denies any other pains at this time. Denies numbness or tingling. Per son patient had two falls yesterday and one today. He also notes she had a fall onto the grass one week ago and somewhat has not been herself since that fall. History of left proximal humerus fracture. Per son patient is normally ambulatory and relatively self sufficient, although she does have a history of dementia. Unknown LOC. Trauma team admitting for recurrent fall workup. She also acquired SDH which may or may not be remote. Past Medical History:    Past Medical History:   Diagnosis Date    Dementia ([facility] [address]     Diabetes mellitus ([facility] [address]     Fracture     proximal lt humerus. no surgery. 10/2018    Hyperlipidemia     Hypertension      Past Surgical History:    No past surgical history on file. Medications Prior to Admission:   Prior to Admission medications    Medication Sig Start Date End Date Taking?  Authorizing Provider   vitamin D (ERGOCALCIFEROL) 1.25 MG (95955 UT) CAPS capsule Take 1 capsule by mouth once a week 11/30/20   Geni Brittle, APRN - NP   haloperidol (HALDOL) 0.5 MG tablet Take 0.5-1 mg by mouth nightly as needed (sleep)    Historical Provider, MD   dipyridamole (PERSANTINE) 25 MG tablet Take 25 mg by mouth 3 times daily     Historical Provider, MD   SITagliptin (JANUVIA) 25 MG tablet Take 25 mg by mouth daily    Historical Provider, MD   potassium chloride (KLOR-CON M) 20 MEQ extended release tablet Take 1 tablet by mouth daily (with breakfast) 5/30/17   Felton Castro MD   atorvastatin (LIPITOR) 20 MG tablet Take 20 mg by mouth daily    Historical Provider, MD   donepezil (ARICEPT) 10 MG tablet Take 10 mg by mouth nightly    Historical Provider, MD   glipiZIDE (GLUCOTROL) 5 MG tablet Take 5-10 mg by mouth See Admin Instructions Indications: Takes 10mg AM and 5mg PM     Historical Provider, MD   lisinopril (PRINIVIL;ZESTRIL) 20 MG tablet Take 20 mg by mouth daily    Historical Provider, MD   metFORMIN (GLUCOPHAGE) 1000 MG tablet Take 1,000 mg by mouth 2 times daily (with meals)    Historical Provider, MD     Allergies:    Penicillins and Tramadol    Social History:   Social History     Socioeconomic History    Marital status:      Spouse name: Not on file    Number of children: Not on file    Years of education: Not on file    Highest education level: Not on file   Occupational History    Not on file   Social Needs    Financial resource strain: Not on file    Food insecurity     Worry: Not on file     Inability: Not on file    Transportation needs     Medical: Not on file     Non-medical: Not on file   Tobacco Use    Smoking status: Former Smoker    Smokeless tobacco: Never Used    Tobacco comment: quit many years ago, cannot remember date   Substance and Sexual Activity    Alcohol use: No    Drug use: No    Sexual activity: Not on file   Lifestyle    Physical activity     Days per week: Not on file     Minutes per session: Not on file    Stress: Not on file   Relationships    Social connections     Talks on phone: Not on file     Gets together: Not on file     Attends Holiness service: Not on file     Active member of club or organization: Not on file     Attends meetings of clubs or organizations: Not on file     Relationship status: Not on file    Intimate partner violence     Fear of current or ex partner: Not on file     Emotionally abused: Not on file     Physically abused: Not on file     Forced sexual activity: Not on file   Other Topics Concern    Not on file   Social History Narrative    Not on file     Family History:  Family History   Family history unknown: Yes     REVIEW OF SYSTEMS:    Constitutional: Negative for fever and chills.    Respiratory: Negative for cough, shortness of breath and wheezing. Cardiovascular: Negative for chest pain and palpitations. Gastrointestinal: Negative for nausea. Negative for vomiting. Musculoskeletal: Positive for right groin pain. See HPI     PHYSICAL EXAM:  There were no vitals taken for this visit. Gen: NAD, cooperative    Head: Normocephalic, atraumatic    Chest: Non labored breathing, b/l clavicles without TTP, crepitus, step off, or deformity    Cardiovascular: Regular rate, no dependent edema, distal pulses 2+     Respiratory: Chest symmetric, no accessory muscle use, normal respirations     Pelvis: Stable to anterior and lateral compression     RUE:  Skin has multiple spots of ecchymosis, frail, no open lesions. No deformities. Non tender to palpation. Full AROM without pain shoulder/elbow/wrist/fingers. Compartments soft and compressible. Ulnar/Median/AIN/PIN motor intact. Median/Radial/Ulnar nerve SILT. Hand and fingers warm and well-perfused w/ BCR; radial pulse 2+. LUE: Skin has multiple spots of ecchymosis, frail, no open lesions. No deformities. Non tender to palpation. Full AROM without pain shoulder/elbow/wrist/fingers. Compartments soft and compressible. Ulnar/Median/AIN/PIN motor intact. Median/Radial/Ulnar nerve SILT. Hand and fingers warm and well-perfused w/ BCR; radial pulse 2+. RLE: Frail, skin has multiple spots of ecchymosis. No deformities. Right hip has small skin tear over greater trochanter with some swelling. Non tender to the bony aspect of greater trochanter and throughout lower extremity. Compartments soft and compressible. EHL/FHL/TA/GSC gross motor intact. Sural, saphenous, superificial/deep peroneal, and plantar nerve distribution SILT. Foot and toes warm and well-perfused w/ BCR; DP pulses 2+. +log roll. Knee ligaments grossly intact. LLE: Frail, skin has multiple spots of ecchymosis. No deformities. Skin intact. Non tender to palpation.  Compartments soft and compressible. EHL/FHL/TA/GSC gross motor intact. Sural, saphenous, superificial/deep peroneal, and plantar nerve distribution SILT. Foot and toes warm and well-perfused w/ BCR; DP pulses 2+. -log roll. Knee ligaments grossly intact. LABS:  Recent Labs     05/12/21  1603   WBC 9.0   HGB 13.8   HCT 41.8      INR 1.0      K 4.1   BUN 20   CREATININE 0.49*   GLUCOSE 276*      Radiology:   X-ray/CT demonstrate minimally displaced medial superior pubic rami fracture    A/P: 68 y.o. female being seen after [facility] with the following injuries:  -Right superior pubic rami fracture  -SDH    -No acute orthopedic surgical intervention indicated at this time  -Weight bearing: Weight bear as tolerated right lower extremity  -Trauma team primary  -Pain control per primary  -Ice for pain/swelling  -F/u Vit D level  -F/u pelvic x-rays  -DVT ppx: EPC, Chemical AC per primary. -PT/OT to help with mobilization  -Follow up with Dr. Gato Mohamud in 7-10 days from injury  -Please page Ortho with any questions or concerns    ----------------------------------------  Wayne Myles,   PGY-3, Department of [facility] [address], [place], [address] talked with the son and he told me that the patient has been going with the other son for a walk in the park almost daily. They are adjacent  but that she fell and since then she has had couple more falls. Patient denies any pain but she does have some dementia. Pelvic compression did not produce any reaction from her. I reviewed the CT scan of the pelvis as well as the x-rays and I am not convinced that there is a fracture. The radiologist also could not find any fracture. Discussed with the son and the patient that she can ambulate possibly with a walker or a cane and should she have any problem in the future I will be happy to see her again.     Attending Physician Statement  I have discussed the case, including pertinent history and exam findings with the resident. I have seen and examined the patient on 5/13/2021 and the key elements of the encounter have been performed by me. I agree with the assessment, plan and orders as documented by the resident.

## 2021-05-12 NOTE — ED PROVIDER NOTES
[address]  ED  eMERGENCY dEPARTMENT eNCOUnter   Attending Attestation     Pt Name: Sheryn Lennox  MRN: [id number]  [place] 1940  Date of evaluation: 5/12/21       Sheryn Lennox is a 68 y.o. female who presents with No chief complaint on file. History: Patient is a transfer from [facility] facility for trauma evaluation and neurosurgical evaluation with subacute subdural.  Patient is acutely more confused today. Exam: Heart rate and rhythm are regular. Lungs are clear to auscultation bilaterally. Abdomen soft, nontender. Pulses are present in the distal extremities. Patient is awake and alert but is confused. Trauma priority was called. Trauma team evaluated. Will to call neurosurgery. Plan for admission. I performed a history and physical examination of the patient and discussed management with the resident. I reviewed the residents note and agree with the documented findings and plan of care. Any areas of disagreement are noted on the chart. I was personally present for the key portions of any procedures. I have documented in the chart those procedures where I was not present during the key portions. I have personally reviewed all images and agree with the resident's interpretation. I have reviewed the emergency nurses triage note. I agree with the chief complaint, past medical history, past surgical history, allergies, medications, social and family history as documented unless otherwise noted below. Documentation of the HPI, Physical Exam and Medical Decision Making performed by medical students or scribes is based on my personal performance of the HPI, PE and MDM. For Phys Assistant/ Nurse Practitioner cases/documentation I have had a face to face evaluation of this patient and have completed at least one if not all key elements of the E/M (history, physical exam, and MDM). Additional findings are as noted.     For APC cases I have personally evaluated and examined the

## 2021-05-12 NOTE — ED PROVIDER NOTES
EMERGENCY DEPARTMENT ENCOUNTER    Pt Name: Wiley Cruz  MRN: [id number]  [place] 1940  Date of evaluation: 5/12/21  CHIEF COMPLAINT       Chief Complaint   Patient presents with    Fall     HISTORY OF PRESENT ILLNESS   44-year-old female brought in by squad for fall and mental status change. Patient is here with her  who is giving majority the history. Patient has dementia and has had some cognitive changes over the last couple of days.  reports that she has had less energy and been very weak the last couple of days. No fevers at home patient has had decreased p.o. intake but is still eating and drinking. Last night when patient was getting ready for bed she had fallen. No significant head injury or injuries per the . He states that he was able to get her up and into the bed but when he awoke this morning he found her on the floor. Patient is awake and cooperative however unable to answer any of my questions. She does not appear to be in any pain. REVIEW OF SYSTEMS     Review of Systems   Unable to perform ROS: Mental status change       PASTMEDICAL HISTORY     Past Medical History:   Diagnosis Date    Dementia ([address]     Diabetes mellitus ([address]     Fracture     proximal lt humerus. no surgery. 10/2018    Hyperlipidemia     Hypertension      Past Problem List  Patient Active Problem List   Diagnosis Code    Alzheimer's disease ([address] G30.9, F02.80    Hypokalemia E87.6    Hypoglycemia E16.2    Community acquired pneumonia of left lower lobe of lung J18.9    Sepsis due to pneumonia ([address] J18.9, A41.9    Hypertension I10    Hyperlipidemia E78.5    Diabetes mellitus ([address] E11.9    Hyperglycemia R73.9    Hypercalcemia E83.52    Pneumonia due to organism J18.9    Fall from standing [address]. Romina Chapa     SURGICAL HISTORY     History reviewed. No pertinent surgical history.   CURRENT MEDICATIONS       Discharge Medication List as of 5/12/2021  3:31 PM      CONTINUE these medications which have NOT CHANGED    Details   vitamin D (ERGOCALCIFEROL) 1.25 MG (41737 UT) CAPS capsule Take 1 capsule by mouth once a week, Disp-8 capsule,R-0Normal      haloperidol (HALDOL) 0.5 MG tablet Take 0.5-1 mg by mouth nightly as needed (sleep)Historical Med      dipyridamole (PERSANTINE) 25 MG tablet Take 25 mg by mouth 3 times daily Historical Med      SITagliptin (JANUVIA) 25 MG tablet Take 25 mg by mouth dailyHistorical Med      potassium chloride (KLOR-CON M) 20 MEQ extended release tablet Take 1 tablet by mouth daily (with breakfast), Disp-60 tablet, R-3Print      atorvastatin (LIPITOR) 20 MG tablet Take 20 mg by mouth dailyHistorical Med      donepezil (ARICEPT) 10 MG tablet Take 10 mg by mouth nightlyHistorical Med      glipiZIDE (GLUCOTROL) 5 MG tablet Take 5-10 mg by mouth See Admin Instructions Indications: Takes 10mg AM and 5mg PM Historical Med      lisinopril (PRINIVIL;ZESTRIL) 20 MG tablet Take 20 mg by mouth dailyHistorical Med      metFORMIN (GLUCOPHAGE) 1000 MG tablet Take 1,000 mg by mouth 2 times daily (with meals)Historical Med           ALLERGIES     is allergic to penicillins and tramadol. FAMILY HISTORY     has no family status information on file. SOCIAL HISTORY       Social History     Tobacco Use    Smoking status: Former Smoker    Smokeless tobacco: Never Used    Tobacco comment: quit many years ago, cannot remember date   Substance Use Topics    Alcohol use: No    Drug use: No     PHYSICAL EXAM     INITIAL VITALS: BP (!) 160/90   Pulse 75   Temp 98.5 °F (36.9 °C) (Oral)   Resp 22   Wt 120 lb (54.4 kg)   SpO2 99%   BMI 16.27 kg/m²    Physical Exam  Constitutional:       General: She is not in acute distress. Appearance: She is well-developed. HENT:      Head: Normocephalic. Eyes:      Pupils: Pupils are equal, round, and reactive to light. Cardiovascular:      Rate and Rhythm: Normal rate and regular rhythm. Heart sounds: Normal heart sounds. Pulmonary:      Effort: Pulmonary effort is normal. No respiratory distress. Breath sounds: Normal breath sounds. Abdominal:      General: Bowel sounds are normal.      Palpations: Abdomen is soft. Tenderness: There is no abdominal tenderness. Musculoskeletal: Normal range of motion. Skin:     General: Skin is warm and dry. Neurological:      Mental Status: She is alert and oriented to person, place, and time. MEDICAL DECISION MAKIN-year-old female presenting to the emergency room for increased weakness and mental status change. Patient is awake and responding without focal deficits however is unable to give any adequate history. Blood work does not show any significant abnormalities. CT head shows subacute subdural hematoma. Case was discussed with [facility] ED who will be willing to accept the patient as a trauma evaluation. Patient has stable vitals here in the emergency room. Respiratory status is stable. ED Course as of May 12 2218   Wed May 12, 2021   1447 I discussed with ED attending Dr. Tanika Hall at [facility]; they are willing to accept. Patient be transferred for acute for subacute subdural hematomas in relation to recent falls. [EG]      ED Course User Index  [EG] Maria Elena Otero MD       CRITICAL CARE:       PROCEDURES:    Procedures    DIAGNOSTIC RESULTS   EKG:All EKG's are interpreted by the Emergency Department Physician who either signs or Co-signs this chart in the absence of a cardiologist.    EKG motion artifact interpretation likely affected  Rate 81  Left axis deviation    QTc 392    RADIOLOGY:All plain film, CT, MRI, and formal ultrasound images (except ED bedside ultrasound) are read by the radiologist, see reports below, unless otherwisenoted in MDM or here. CT CERVICAL SPINE WO CONTRAST   Final Result   No acute abnormality of the cervical spine.          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 Areas of left FLACO territory encephalomalacia appear unchanged compatible with   remote infarct. Findings were discussed with Dr. Kalin Velásquez at 2:14 pm on 5/12/2021   by Dr. Neisha Champagne. XR CHEST PORTABLE   Final Result   No acute abnormality. XR FOOT LEFT (MIN 3 VIEWS)   Final Result   No definite acute finding; no fracture or dislocation. Some soft tissue   swelling forefoot and possible subtle changes position threaded screw   transfixing 1st toe. If findings are clinically at the site of the latter,   consider three dedicated views of the left toes. LABS: All lab results were reviewed by myself, and all abnormals are listed below.   Labs Reviewed   CBC WITH AUTO DIFFERENTIAL - Abnormal; Notable for the following components:       Result Value    Seg Neutrophils 87 (*)     Lymphocytes 6 (*)     Eosinophils % 0 (*)     Immature Granulocytes 1 (*)     Absolute Lymph # 0.52 (*)     All other components within normal limits   BASIC METABOLIC PANEL W/ REFLEX TO MG FOR LOW K - Abnormal; Notable for the following components:    Glucose 326 (*)     Bun/Cre Ratio 37 (*)     All other components within normal limits   HEPATIC FUNCTION PANEL - Abnormal; Notable for the following components:    Alkaline Phosphatase 164 (*)     AST 35 (*)     All other components within normal limits   LACTIC ACID - Abnormal; Notable for the following components:    Lactic Acid 2.9 (*)     All other components within normal limits   TROPONIN - Abnormal; Notable for the following components:    Troponin, High Sensitivity 17 (*)     All other components within normal limits   URINE RT REFLEX TO CULTURE - Abnormal; Notable for the following components:    Turbidity UA SLIGHTLY CLOUDY (*)     Glucose, Ur 3+ (*)     Ketones, Urine 1+ (*)     Urine Hgb 2+ (*) Protein, UA 1+ (*)     All other components within normal limits   MICROSCOPIC URINALYSIS - Abnormal; Notable for the following components:    Mucus, UA 2+ (*)     All other components within normal limits   PROTIME-INR - Abnormal; Notable for the following components:    Protime 14.3 (*)     All other components within normal limits   COVID-19, RAPID   APTT       EMERGENCY DEPARTMENTCOURSE:         Vitals:    Vitals:    05/12/21 1300 05/12/21 1315 05/12/21 1415 05/12/21 1445   BP: (!) 124/99 (!) 148/81 (!) 140/95 (!) 160/90   Pulse: 77 66 69 75   Resp: 24 19 21 22   Temp:       TempSrc:       SpO2: 99% 97% 97% 99%   Weight:           The patient was given the following medications while in the emergency department:  Orders Placed This Encounter   Medications    0.9 % sodium chloride bolus     CONSULTS:  None    FINAL IMPRESSION      1. Injury of head, initial encounter    2. Fall, initial encounter    3. Subdural hematoma ([facility] [address]    4. Altered mental status, unspecified altered mental status type          DISPOSITION/PLAN   DISPOSITION Decision To Transfer 05/12/2021 03:25:39 PM      PATIENT REFERRED TO:  No follow-up provider specified.   DISCHARGE MEDICATIONS:  Discharge Medication List as of 5/12/2021  3:31 PM        Chas Allen MD  Attending Emergency Physician                 Emma Mac MD  05/12/21 4244

## 2021-05-12 NOTE — ED NOTES
Bed: 14  Expected date:   Expected time:   Means of arrival:   Comments:  Hold for trauma     Gaby Alejo RN  05/12/21 2040

## 2021-05-12 NOTE — ED NOTES
Pt to ed via ems with c/o fall. Pt son states he went into the bedroom this morning and was on the floor. Per pt son pt has been falling frequently and has been confused and not acting per her norm. Pt has hx of dementia and is normally \"somewhat\" confused. Pt arrives to ed with c-collar in place. Pt son states loc is unknown. Per pt son pt normally ambulates and is active without assistance. Pt has had difficulty ambulating since Saturday. Pt has abrasions noted to bilateral hips from recent fall. Pt has skin tears noted to left and right forearm/elbow areas. resp non-labored.       Carl Brady RN  05/12/21 0910 Anna Landa RN  05/12/21 [id number]

## 2021-05-12 NOTE — ED PROVIDER NOTES
[facility] ED  Emergency Department Encounter  EmergencyMedicine Resident     Pt Name:Alondra Francisco  MRN: [id number]  [place] 1940  Date of evaluation: 5/12/21  PCP:  Evaristo     CHIEF COMPLAINT       No chief complaint on file. fall sdh identified at [facility]    HISTORY OF PRESENT ILLNESS  (Location/Symptom, Timing/Onset, Context/Setting, Quality, Duration, Modifying Factors, Severity.)      Ricardo Pereira is a 68 y.o. female who presents to ground EMS transfer from [facility] where she presented from home after being found on the floor of her residence by her son. Per report patient had at least 2 episodes of falling from standing height both yesterday and then again today patient does not recall these events does not know where she is currently and does not remember the last time she had anything to eat patient underwent CT imaging at outside facility demonstrating a subdural hematoma. stable en route    PAST MEDICAL / SURGICAL / SOCIAL / FAMILY HISTORY      has a past medical history of Dementia ([address] Diabetes mellitus ([address] Fracture, Hyperlipidemia, and Hypertension. has no past surgical history on file. Social History     Socioeconomic History    Marital status:       Spouse name: Not on file    Number of children: Not on file    Years of education: Not on file    Highest education level: Not on file   Occupational History    Not on file   Social Needs    Financial resource strain: Not on file    Food insecurity     Worry: Not on file     Inability: Not on file    Transportation needs     Medical: Not on file     Non-medical: Not on file   Tobacco Use    Smoking status: Former Smoker    Smokeless tobacco: Never Used    Tobacco comment: quit many years ago, cannot remember date   Substance and Sexual Activity    Alcohol use: No    Drug use: No    Sexual activity: Not on file   Lifestyle    Physical activity     Days per week: Not on file Minutes per session: Not on file    Stress: Not on file   Relationships    Social connections     Talks on phone: Not on file     Gets together: Not on file     Attends Worship service: Not on file     Active member of club or organization: Not on file     Attends meetings of clubs or organizations: Not on file     Relationship status: Not on file    Intimate partner violence     Fear of current or ex partner: Not on file     Emotionally abused: Not on file     Physically abused: Not on file     Forced sexual activity: Not on file   Other Topics Concern    Not on file   Social History Narrative    Not on file       Family History   Family history unknown: Yes       Allergies:  Penicillins and Tramadol    Home Medications:  Prior to Admission medications    Medication Sig Start Date End Date Taking?  Authorizing Provider   vitamin D (ERGOCALCIFEROL) 1.25 MG (68675 UT) CAPS capsule Take 1 capsule by mouth once a week 11/30/20   PEYMAN Ulloa NP   haloperidol (HALDOL) 0.5 MG tablet Take 0.5-1 mg by mouth nightly as needed (sleep)    Historical Provider, MD   dipyridamole (PERSANTINE) 25 MG tablet Take 25 mg by mouth 3 times daily     Historical Provider, MD   SITagliptin (JANUVIA) 25 MG tablet Take 25 mg by mouth daily    Historical Provider, MD   potassium chloride (KLOR-CON M) 20 MEQ extended release tablet Take 1 tablet by mouth daily (with breakfast) 5/30/17   Lexi Chang MD   atorvastatin (LIPITOR) 20 MG tablet Take 20 mg by mouth daily    Historical Provider, MD   donepezil (ARICEPT) 10 MG tablet Take 10 mg by mouth nightly    Historical Provider, MD   glipiZIDE (GLUCOTROL) 5 MG tablet Take 5-10 mg by mouth See Admin Instructions Indications: Takes 10mg AM and 5mg PM     Historical Provider, MD   lisinopril (PRINIVIL;ZESTRIL) 20 MG tablet Take 20 mg by mouth daily    Historical Provider, MD   metFORMIN (GLUCOPHAGE) 1000 MG tablet Take 1,000 mg by mouth 2 times daily (with meals)    Historical Provider, MD       REVIEW OF SYSTEMS    (2-9 systems for level 4, 10 or more for level 5)      Review of Systems   Unable to perform ROS: Dementia       PHYSICAL EXAM   (up to 7 for level 4, 8 or more for level 5)      INITIAL VITALS:   BP (!) 153/80   Pulse 86   Resp 18   SpO2 98%     Physical Exam  Constitutional:       Comments: Underweight chronically malnourished   HENT:      Head:      Comments: Temporal muscle wasting bilaterally     Right Ear: Tympanic membrane, ear canal and external ear normal.      Left Ear: Tympanic membrane, ear canal and external ear normal.      Ears:      Comments: No hemotympanum no CSF rhinorrhea no CSF otorrhea no raccoon eyes no glasgow sign. Nose: Nose normal.      Mouth/Throat:      Mouth: Mucous membranes are dry. Pharynx: Oropharynx is clear. Eyes:      General:         Right eye: No discharge. Left eye: No discharge. Comments: Pupils are equal round reactive to light 2 mm bilaterally   Neck:      Comments: Tenderness to palpation throughout the CT and L-spine patient is presenting in a collar report is that CT imaging of her cervical spine was negative for any acute abnormalities  Cardiovascular:      Rate and Rhythm: Normal rate. Pulses: Normal pulses. Pulmonary:      Effort: Pulmonary effort is normal.   Abdominal:      Palpations: Abdomen is soft. Skin:     General: Skin is warm. Neurological:      Mental Status: She is alert. She is disoriented.       Comments: Unclear what her baseline is         DIFFERENTIAL  DIAGNOSIS     PLAN (LABS / IMAGING / EKG):  Orders Placed This Encounter   Procedures    CT CHEST ABDOMEN PELVIS WO CONTRAST    CT THORACIC SPINE TRAUMA RECONSTRUCTION    CT LUMBAR SPINE TRAUMA RECONSTRUCTION    US Ed Fast Abdomen Limited    CK    MYOGLOBIN, SERUM    Trauma Panel    Inpatient consult to Neurosurgery    Inpatient consult to Orthopedic Surgery    Type and Screen    PATIENT STATUS (FROM ED OR OR/PROCEDURAL) Observation    PATIENT STATUS (FROM ED OR OR/PROCEDURAL) Observation       MEDICATIONS ORDERED:  No orders of the defined types were placed in this encounter.       DDX: Trauma, sdh    DIAGNOSTIC RESULTS / EMERGENCY DEPARTMENT COURSE / MDM   LAB RESULTS:  Results for orders placed or performed during the hospital encounter of 05/12/21   Trauma Panel   Result Value Ref Range    Ethanol <10 <10 mg/dL    Ethanol percent <0.010 <0.010 %    Blood Bank Specimen BILL FOR SERVICES PERFORMED     BUN 20 8 - 23 mg/dL    WBC 9.0 3.5 - 11.3 k/uL    RBC 4.69 3.95 - 5.11 m/uL    Hemoglobin 13.8 11.9 - 15.1 g/dL    Hematocrit 41.8 36.3 - 47.1 %    MCV 89.1 82.6 - 102.9 fL    MCH 29.4 25.2 - 33.5 pg    MCHC 33.0 28.4 - 34.8 g/dL    RDW 12.6 11.8 - 14.4 %    Platelets 071 368 - 334 k/uL    MPV 10.9 8.1 - 13.5 fL    NRBC Automated 0.0 0.0 per 100 WBC    CREATININE 0.49 (L) 0.50 - 0.90 mg/dL    GFR Non-African American >60 >60 mL/min    GFR African American >60 >60 mL/min    GFR Comment          GFR Staging NOT REPORTED     Glucose 276 (H) 70 - 99 mg/dL    hCG Qual NEGATIVE NEGATIVE    Sodium 140 135 - 144 mmol/L    Potassium 4.1 3.7 - 5.3 mmol/L    Chloride 103 98 - 107 mmol/L    CO2 26 20 - 31 mmol/L    Anion Gap 11 9 - 17 mmol/L    Protime 10.5 9.1 - 12.3 sec    INR 1.0     PTT 21.8 20.5 - 30.5 sec    pH, Efrain 7.412 7.320 - 7.420    pCO2, Efrain 44.8 39 - 55    pO2, Efrain 16.9 (L) 30 - 50    HCO3, Venous 27.9 24 - 30 mmol/L    Positive Base Excess, Efrain 3.2 (H) 0.0 - 2.0 mmol/L    Negative Base Excess, Efrain NOT REPORTED 0.0 - 2.0 mmol/L    O2 Sat, Efrain 21.8 (L) 60.0 - 85.0 %    Total Hb NOT REPORTED 12.0 - 16.0 g/dl    Oxyhemoglobin NOT REPORTED 95.0 - 98.0 %    Carboxyhemoglobin 0.6 0 - 5 %    Methemoglobin NOT REPORTED 0.0 - 1.5 %    Pt Temp 37.0     pH, Efrain, Temp Adj NOT REPORTED 7.320 - 7.420    pCO2, Efrain, Temp Adj NOT REPORTED 39 - 55 mmHg    pO2, Efrain, Temp Adj NOT REPORTED 30 - 50 mmHg    O2 Device/Flow/% NOT REPORTED Respiratory Rate NOT REPORTED     Dominic Test NOT REPORTED     Sample Site NOT REPORTED     Pt. Position NOT REPORTED     Mode NOT REPORTED     Set Rate NOT REPORTED     Total Rate NOT REPORTED     VT NOT REPORTED     FIO2 INFORMATION NOT PROVIDED     Peep/Cpap NOT REPORTED     PSV NOT REPORTED     Text for Respiratory NOT REPORTED     NOTIFICATION NOT REPORTED     NOTIFICATION TIME NOT REPORTED    TYPE AND SCREEN   Result Value Ref Range    Expiration Date 05/15/2021,2359     Arm Band Number BE [id number]     ABO/Rh O POSITIVE     Antibody Screen NEGATIVE        IMPRESSION: Patient is a alert 72-year-old female with GCS of 14 for confusion presents as a EMS ground transfer after identification of a subdural hemorrhage is post several falls is unclear what the patient's baseline is he is report per EMS that the patient is more altered than normal.I saw and evaluated this patient as the airway physician for this trauma priority airway is intact she is satting well on room air equal breath sounds bilaterally and equal chest rise remainder of her work-up disposition per trauma service    RADIOLOGY:  Xr Foot Left (min 3 Views)    Result Date: 5/12/2021  EXAMINATION: THREE XRAY VIEWS OF THE LEFT FOOT 5/12/2021 12:35 pm COMPARISON: Three-view study of the left foot from 05/26/2017 HISTORY: ORDERING SYSTEM PROVIDED HISTORY: fall, swelling TECHNOLOGIST PROVIDED HISTORY: fall, swelling Reason for Exam: patient fell, pain and swelling in left foot. Acuity: Acute Type of Exam: Initial History of diabetes and dementia FINDINGS: No acute fracture. No dislocation. Hammertoes. Some soft tissue swelling the lateral projection plantar aspect forefoot; again noted partially threaded screw transfixing the interphalangeal joints of the 1st digit, not optimally visualized on this study, possibly now associated with some erosion of the head of the 1st metatarsal, but otherwise in unchanged position.  Calcaneal enthesophytes at the insertions of the Achilles and plantar tendons. Mild degenerative changes tarsal bones. Osteopenia. No definite acute finding; no fracture or dislocation. Some soft tissue swelling forefoot and possible subtle changes position threaded screw transfixing 1st toe. If findings are clinically at the site of the latter, consider three dedicated views of the left toes. Ct Head Wo Contrast    Result Date: 5/12/2021  EXAMINATION: CT OF THE HEAD WITHOUT CONTRAST  5/12/2021 1:47 pm TECHNIQUE: CT of the head was performed without the administration of intravenous contrast. Dose modulation, iterative reconstruction, and/or weight based adjustment of the mA/kV was utilized to reduce the radiation dose to as low as reasonably achievable. COMPARISON: November 23, 2020 HISTORY: ORDERING SYSTEM PROVIDED HISTORY: Altered mental status, falls TECHNOLOGIST PROVIDED HISTORY: AMS, falls Decision Support Exception - unselect if not a suspected or confirmed emergency medical condition->Emergency Medical Condition (MA) Reason for Exam: AMS, falls, best images possible Acuity: Acute Type of Exam: Initial FINDINGS: BRAIN/VENTRICLES: Low-density extra-axial fluid collections present over the cerebral hemispheres bilaterally measuring approximately 6 mm on the right and 8 mm on the left in maximal thickness. No significant mass effect or midline shift. Diffuse cortical volume loss and compensatory ventricular enlargement appears unchanged. Areas of encephalomalacia involving the left frontal lobe and parietal lobe appear unchanged compatible with remote infarct. Periventricular white matter hypoattenuation bilaterally compatible chronic microvascular ischemic changes. ORBITS: The visualized portion of the orbits demonstrate no acute abnormality. SINUSES: The visualized paranasal sinuses and mastoid air cells demonstrate no acute abnormality. SOFT TISSUES/SKULL:  No acute abnormality of the visualized skull or soft tissues. Rounded calcification underlying the right parietal calvarium compatible with a meningioma, unchanged. 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 Areas of left FLACO territory encephalomalacia appear unchanged compatible with remote infarct. Findings were discussed with Dr. Althea Garcia at 2:14 pm on 5/12/2021 by Dr. Eric Warren. Ct Cervical Spine Wo Contrast    Result Date: 5/12/2021  EXAMINATION: CT OF THE CERVICAL SPINE WITHOUT CONTRAST 5/12/2021 1:47 pm TECHNIQUE: CT of the cervical spine was performed without the administration of intravenous contrast. Multiplanar reformatted images are provided for review. Dose modulation, iterative reconstruction, and/or weight based adjustment of the mA/kV was utilized to reduce the radiation dose to as low as reasonably achievable. COMPARISON: CT cervical spine August 11, 2019 HISTORY: ORDERING SYSTEM PROVIDED HISTORY: recurrent falls TECHNOLOGIST PROVIDED HISTORY: recurrent falls Decision Support Exception - unselect if not a suspected or confirmed emergency medical condition->Emergency Medical Condition (MA) Reason for Exam: AMS, falls, best images possible Acuity: Acute Type of Exam: Initial FINDINGS: BONES/ALIGNMENT: There is no acute fracture or traumatic malalignment. Stable mild grade 1 retrolisthesis of C5 on C6 which appears degenerative. DEGENERATIVE CHANGES: Multilevel mild-to-moderate degenerative change of the cervical spine with degenerative disc disease most pronounced at the C5-C6 level. SOFT TISSUES: There is no prevertebral soft tissue swelling. Severe atherosclerotic disease of the partially imaged thoracic aorta. Paraseptal emphysematous changes noted. No acute abnormality of the cervical spine.      Xr Chest Portable    Result Date: 5/12/2021  EXAMINATION: ONE XRAY VIEW OF THE CHEST 5/12/2021 12:35 pm COMPARISON: Chest radiograph 11/26/2020. HISTORY: ORDERING SYSTEM PROVIDED HISTORY: falls, AMS TECHNOLOGIST PROVIDED HISTORY: falls, AMS Reason for Exam: patient fell, AMS Acuity: Acute Type of Exam: Initial FINDINGS: Single portable view provided. There is moderate rotation to the right. Aortic atherosclerosis with mild ascending aortic ectasia. Normal heart size. Stable left 1 cm remote healed granulomatous disease. No acute consolidation. No effusion or pneumothorax. No free subdiaphragmatic air. Right shoulder glenohumeral arthropathy. No acute abnormality. Us Ed Fast Abdomen Limited    Result Date: 5/12/2021  POCUS_FAST:     Exam Information:         Exam type:  Clinically indicated     Indication(s) for Exam:         Other Indication(s):  Trauma Priority     Views Obtained & Images Saved for These Views: The following organs were examined as part of the FAST exam[de-identified]  The heart, diaphragms, liver, spleen, kidneys, and bladder were identified and examined. Findings:         Morison's pouch (RUQ):  Fluid absent         Splenorenal space (LUQ):  Fluid absent         Pericardial space:  Fluid absent         Pericardial tamponade?:  Absent         Retrovesicular space:  Fluid absent     Interpretation:         No pathologic free fluid     Confirmatory study:         What confirmatory study was done?:  CT         Confirmatory study findings[de-identified]  pending Electronically signed by Maria De Jesus Reilly on Wednesday, May 12, 2021 at 4:52 PM     Ct Chest Abdomen Pelvis Wo Contrast    Result Date: 5/12/2021  EXAMINATION: CT OF THE CHEST, ABDOMEN, AND PELVIS WITHOUT CONTRAST 5/12/2021 4:13 pm TECHNIQUE: CT of the chest, abdomen and pelvis was performed without the administration of intravenous contrast. Multiplanar reformatted images are provided for review.  Dose modulation, iterative reconstruction, and/or weight based adjustment of the mA/kV was utilized to reduce the radiation dose to as low as reasonably achievable. COMPARISON: CT thoracic and lumbar spine 05/12/2021. CT PE and CT abdomen/pelvis exams 11/23/2020. HISTORY: ORDERING SYSTEM PROVIDED HISTORY: trauma TECHNOLOGIST PROVIDED HISTORY: trauma Decision Support Exception - unselect if not a suspected or confirmed emergency medical condition->Emergency Medical Condition (MA) Reason for Exam: trauma FINDINGS: MEDIASTINUM:  Normal heart size with no significant pericardial effusion. Extensive triple-vessel distribution coronary artery calcifications. Extensive thoracic aortic atherosclerotic calcifications with 3.1 cm ectasia of the ascending thoracic aorta. No intramural hyperdensity or wall thickening. Esophagus is normal.  No mediastinal free air or hematoma. No significant mediastinal or hilar lymphadenopathy. Evidence of remote healed granulomatous disease. LUNGS/AIRWAYS:  The trachea and bronchi are patent. Mild pulmonary hyperinflation with emphysematous changes. No effusion or pneumothorax. Mild dependent atelectasis. Stable anterolateral lingular subpleural 0.8 cm calcified granuloma. No acute consolidation or interstitial changes. Stable mild biapical scarring. Stable right upper lobe lateral subpleural 3 mm solid nodule on axial image 25. ABDOMEN:  Prior cholecystectomy. The liver, spleen, common bile duct, pancreas, and adrenals are normal.  Nonobstructing left nephrolithiasis. The bilateral kidneys demonstrate no acute abnormality. The ureters are normal. Extensive abdominal aortic atherosclerosis with no aneurysm formation. The stomach and small bowel are normal.  Mild colonic fecal retention with no acute abnormality. No ascites or free air. No peritoneal/retroperitoneal hemorrhage. PELVIS:  The bladder and uterus are normal.  Mild fecal retention in the rectal vault with no acute abnormality. No significant acute intrapelvic hemorrhage. MUSCULOSKELETAL STRUCTURES:  The body wall soft tissues demonstrate mild edema.   Mild asymmetric posttraumatic edema and hemorrhage along the left external obturator muscle and pectineus muscle. Decreased bone mineral density. Stable mild pronounced kyphosis of the upper thoracic spine with normal alignment and vertebral body heights. Lower thoracic spine degenerative disc and joint disease. Right 3rd rib anterior aspect nondisplaced linear sclerosis suggesting subacute healing fracture. There are few other remote right posterolateral rib fractures. No acute displaced rib fracture. Normal lumbar spine alignment with multilevel degenerative changes. The bilateral sacroiliac joints demonstrate stable alignment with asymmetric joint spaces consistent with right-sided osteoarthritis. Normal bilateral hip alignment. There is an acute mildly displaced nonangulated left superior and inferior pubic rami fracture. The pubic symphysis demonstrates normal alignment. 1. No acute thoracic traumatic abnormality. 2. Acute mildly displaced nonangulated left superior inferior pubic rami fractures. Normal bilateral hip alignment with no acute proximal femur fracture. 3. No acute intra-abdominal abnormality. Ct Lumbar Spine Trauma Reconstruction    Result Date: 5/12/2021  EXAMINATION: CT OF THE LUMBAR SPINE WITHOUT CONTRAST  5/12/2021 TECHNIQUE: CT of the lumbar spine was performed without the administration of intravenous contrast. Multiplanar reformatted images are provided for review. Dose modulation, iterative reconstruction, and/or weight based adjustment of the mA/kV was utilized to reduce the radiation dose to as low as reasonably achievable. COMPARISON: None HISTORY: ORDERING SYSTEM PROVIDED HISTORY: trauma TECHNOLOGIST PROVIDED HISTORY: trauma Reason for Exam: trauma FINDINGS: BONES/ALIGNMENT: There is normal alignment of the spine. The vertebral body heights are maintained. No osseous destructive lesion is seen.  DEGENERATIVE CHANGES: Moderate degenerative changes of lumbar spine at L2-L3, L3-L4 and L4-L5 with posterior osteophytic ridging. At L5-S1, moderate bilateral neural foraminal narrowing. SOFT TISSUES/RETROPERITONEUM: No paraspinal mass is seen. No acute osseous abnormality within the lumbar spine. Moderate degenerative changes of lumbar spine at L2-L3, L3-L4 and L4-L5 with posterior osteophytic ridging. Ct Thoracic Spine Trauma Reconstruction    Result Date: 5/12/2021  EXAMINATION: CT OF THE THORACIC SPINE WITHOUT CONTRAST  5/12/2021 4:13 pm: TECHNIQUE: CT of the thoracic spine was performed without the administration of intravenous contrast. Multiplanar reformatted images are provided for review. Dose modulation, iterative reconstruction, and/or weight based adjustment of the mA/kV was utilized to reduce the radiation dose to as low as reasonably achievable. COMPARISON: CT thorax November 23, 2020. HISTORY: ORDERING SYSTEM PROVIDED HISTORY: trauma TECHNOLOGIST PROVIDED HISTORY: trauma Reason for Exam: trauma FINDINGS: BONES/ALIGNMENT: There is normal alignment of the spine except mild kypho scoliosis. The vertebral body heights are maintained except slight anterior wedging at T2 unchanged. No osseous destructive lesion is seen. Stable 4 x 7 mm sclerotic lesion within the vertebral body of T 3 on the left. DEGENERATIVE CHANGES: No gross spinal canal stenosis or bony neural foraminal narrowing of the thoracic spine. Mild spondylosis. SOFT TISSUES: No paraspinal mass is seen. Mild remote compression fracture T2. No acute disease otherwise.        EKG  none    All EKG's are interpreted by the Emergency Department Physician who either signs or Co-signs this chart in the absence of a cardiologist.    EMERGENCY DEPARTMENT COURSE:  Patient seen and evaluated as the airway physician for this trauma priority she was found to have abnormalities consistent with a subdural hematoma versus dural hygromas on CT imaging of her head at outside facility found to have a hip fracture on our evaluation here in the emergency department patient stable seen and evaluated with the trauma and orthopedic surgery consultants consult placed to neurosurgery patient admitted    PROCEDURES:  none    CONSULTS:  IP CONSULT TO NEUROSURGERY    CRITICAL CARE:  Please see attending note    FINAL IMPRESSION      1. Fall from standing, initial encounter    2. SDH (subdural hematoma) (HCC)    3. Closed fracture of multiple pubic rami, left, initial encounter ([facility] [address]          DISPOSITION / PLAN     DISPOSITION  admited      PATIENT REFERRED TO:  No follow-up provider specified.     DISCHARGE MEDICATIONS:  New Prescriptions    No medications on file       Olivia Hope DO  Emergency Medicine Resident    (Please note that portions of thisnote were completed with a voice recognition program.  Efforts were made to edit the dictations but occasionally words are mis-transcribed.)       Olivia Hope DO  Resident  05/12/21 [id number]

## 2021-05-12 NOTE — ED NOTES
Fred Franco  43CT female  Multiple recent falls  Dementia  Found down today  [facility] on [address], RN  05/12/21 [id number]

## 2021-05-12 NOTE — Clinical Note
Patient Class: Observation [104]   REQUIRED: Diagnosis: Fall from standing, initial encounter [862210]   Estimated Length of Stay: Estimated stay of less than 2 midnights   Telemetry/Cardiac Monitoring Required?: Yes

## 2021-05-13 PROBLEM — S32.519A CLOSED FRACTURE OF SUPERIOR PUBIC RAMUS (HCC): Status: ACTIVE | Noted: 2021-05-13

## 2021-05-13 LAB
ABSOLUTE EOS #: <0.03 K/UL (ref 0–0.44)
ABSOLUTE IMMATURE GRANULOCYTE: 0.03 K/UL (ref 0–0.3)
ABSOLUTE LYMPH #: 1.27 K/UL (ref 1.1–3.7)
ABSOLUTE MONO #: 0.49 K/UL (ref 0.1–1.2)
ANION GAP SERPL CALCULATED.3IONS-SCNC: 10 MMOL/L (ref 9–17)
BASOPHILS # BLD: 0 % (ref 0–2)
BASOPHILS ABSOLUTE: 0.03 K/UL (ref 0–0.2)
BUN BLDV-MCNC: 22 MG/DL (ref 8–23)
BUN/CREAT BLD: ABNORMAL (ref 9–20)
CALCIUM SERPL-MCNC: 8.2 MG/DL (ref 8.6–10.4)
CHLORIDE BLD-SCNC: 107 MMOL/L (ref 98–107)
CO2: 24 MMOL/L (ref 20–31)
CREAT SERPL-MCNC: 0.46 MG/DL (ref 0.5–0.9)
DIFFERENTIAL TYPE: ABNORMAL
EKG ATRIAL RATE: 81 BPM
EKG P AXIS: 40 DEGREES
EKG P-R INTERVAL: 164 MS
EKG Q-T INTERVAL: 338 MS
EKG QRS DURATION: 72 MS
EKG QTC CALCULATION (BAZETT): 392 MS
EKG R AXIS: -35 DEGREES
EKG T AXIS: 20 DEGREES
EKG VENTRICULAR RATE: 81 BPM
EOSINOPHILS RELATIVE PERCENT: 0 % (ref 1–4)
GFR AFRICAN AMERICAN: >60 ML/MIN
GFR NON-AFRICAN AMERICAN: >60 ML/MIN
GFR SERPL CREATININE-BSD FRML MDRD: ABNORMAL ML/MIN/{1.73_M2}
GFR SERPL CREATININE-BSD FRML MDRD: ABNORMAL ML/MIN/{1.73_M2}
GLUCOSE BLD-MCNC: 108 MG/DL (ref 65–105)
GLUCOSE BLD-MCNC: 229 MG/DL (ref 70–99)
GLUCOSE BLD-MCNC: 264 MG/DL (ref 65–105)
GLUCOSE BLD-MCNC: 63 MG/DL (ref 65–105)
GLUCOSE BLD-MCNC: 69 MG/DL (ref 65–105)
GLUCOSE BLD-MCNC: 75 MG/DL (ref 65–105)
GLUCOSE BLD-MCNC: 78 MG/DL (ref 65–105)
HCT VFR BLD CALC: 39.7 % (ref 36.3–47.1)
HEMOGLOBIN: 12.7 G/DL (ref 11.9–15.1)
IMMATURE GRANULOCYTES: 0 %
LYMPHOCYTES # BLD: 17 % (ref 24–43)
MCH RBC QN AUTO: 30 PG (ref 25.2–33.5)
MCHC RBC AUTO-ENTMCNC: 32 G/DL (ref 28.4–34.8)
MCV RBC AUTO: 93.6 FL (ref 82.6–102.9)
MONOCYTES # BLD: 6 % (ref 3–12)
MYOGLOBIN: 243 NG/ML (ref 25–58)
NRBC AUTOMATED: 0 PER 100 WBC
PDW BLD-RTO: 12.7 % (ref 11.8–14.4)
PLATELET # BLD: 302 K/UL (ref 138–453)
PLATELET ESTIMATE: ABNORMAL
PMV BLD AUTO: 10.9 FL (ref 8.1–13.5)
POTASSIUM SERPL-SCNC: 3.6 MMOL/L (ref 3.7–5.3)
RBC # BLD: 4.24 M/UL (ref 3.95–5.11)
RBC # BLD: ABNORMAL 10*6/UL
SEG NEUTROPHILS: 77 % (ref 36–65)
SEGMENTED NEUTROPHILS ABSOLUTE COUNT: 5.8 K/UL (ref 1.5–8.1)
SODIUM BLD-SCNC: 141 MMOL/L (ref 135–144)
TOTAL CK: 803 U/L (ref 26–192)
WBC # BLD: 7.6 K/UL (ref 3.5–11.3)
WBC # BLD: ABNORMAL 10*3/UL

## 2021-05-13 PROCEDURE — 2580000003 HC RX 258: Performed by: STUDENT IN AN ORGANIZED HEALTH CARE EDUCATION/TRAINING PROGRAM

## 2021-05-13 PROCEDURE — G0378 HOSPITAL OBSERVATION PER HR: HCPCS

## 2021-05-13 PROCEDURE — 96374 THER/PROPH/DIAG INJ IV PUSH: CPT

## 2021-05-13 PROCEDURE — 6370000000 HC RX 637 (ALT 250 FOR IP): Performed by: STUDENT IN AN ORGANIZED HEALTH CARE EDUCATION/TRAINING PROGRAM

## 2021-05-13 PROCEDURE — 36415 COLL VENOUS BLD VENIPUNCTURE: CPT

## 2021-05-13 PROCEDURE — 92523 SPEECH SOUND LANG COMPREHEN: CPT

## 2021-05-13 PROCEDURE — 85025 COMPLETE CBC W/AUTO DIFF WBC: CPT

## 2021-05-13 PROCEDURE — 6360000002 HC RX W HCPCS: Performed by: NURSE PRACTITIONER

## 2021-05-13 PROCEDURE — 96372 THER/PROPH/DIAG INJ SC/IM: CPT

## 2021-05-13 PROCEDURE — 80048 BASIC METABOLIC PNL TOTAL CA: CPT

## 2021-05-13 PROCEDURE — 82550 ASSAY OF CK (CPK): CPT

## 2021-05-13 PROCEDURE — 83874 ASSAY OF MYOGLOBIN: CPT

## 2021-05-13 PROCEDURE — 6370000000 HC RX 637 (ALT 250 FOR IP): Performed by: NURSE PRACTITIONER

## 2021-05-13 PROCEDURE — 82947 ASSAY GLUCOSE BLOOD QUANT: CPT

## 2021-05-13 RX ORDER — GLIPIZIDE 10 MG/1
10 TABLET ORAL DAILY
Status: DISCONTINUED | OUTPATIENT
Start: 2021-05-13 | End: 2021-05-15 | Stop reason: HOSPADM

## 2021-05-13 RX ORDER — DEXTROSE MONOHYDRATE 50 MG/ML
100 INJECTION, SOLUTION INTRAVENOUS PRN
Status: DISCONTINUED | OUTPATIENT
Start: 2021-05-13 | End: 2021-05-13

## 2021-05-13 RX ORDER — DEXTROSE MONOHYDRATE 25 G/50ML
12.5 INJECTION, SOLUTION INTRAVENOUS PRN
Status: DISCONTINUED | OUTPATIENT
Start: 2021-05-13 | End: 2021-05-15 | Stop reason: HOSPADM

## 2021-05-13 RX ORDER — NICOTINE POLACRILEX 4 MG
15 LOZENGE BUCCAL PRN
Status: DISCONTINUED | OUTPATIENT
Start: 2021-05-13 | End: 2021-05-15 | Stop reason: HOSPADM

## 2021-05-13 RX ORDER — POLYETHYLENE GLYCOL 3350 17 G/17G
17 POWDER, FOR SOLUTION ORAL DAILY
Status: DISCONTINUED | OUTPATIENT
Start: 2021-05-13 | End: 2021-05-15 | Stop reason: HOSPADM

## 2021-05-13 RX ORDER — GLIPIZIDE 5 MG/1
5 TABLET ORAL DAILY
Status: DISCONTINUED | OUTPATIENT
Start: 2021-05-13 | End: 2021-05-15 | Stop reason: HOSPADM

## 2021-05-13 RX ORDER — IBUPROFEN 400 MG/1
400 TABLET ORAL EVERY 4 HOURS
Status: DISCONTINUED | OUTPATIENT
Start: 2021-05-13 | End: 2021-05-14

## 2021-05-13 RX ORDER — ALOGLIPTIN 6.25 MG/1
6.25 TABLET, FILM COATED ORAL DAILY
Status: DISCONTINUED | OUTPATIENT
Start: 2021-05-13 | End: 2021-05-15 | Stop reason: HOSPADM

## 2021-05-13 RX ADMIN — SODIUM CHLORIDE, PRESERVATIVE FREE 10 ML: 5 INJECTION INTRAVENOUS at 22:32

## 2021-05-13 RX ADMIN — LISINOPRIL 20 MG: 10 TABLET ORAL at 11:28

## 2021-05-13 RX ADMIN — DIPYRIDAMOLE 25 MG: 25 TABLET, FILM COATED ORAL at 15:18

## 2021-05-13 RX ADMIN — METFORMIN HYDROCHLORIDE 1000 MG: 500 TABLET ORAL at 11:29

## 2021-05-13 RX ADMIN — SODIUM CHLORIDE: 9 INJECTION, SOLUTION INTRAVENOUS at 00:02

## 2021-05-13 RX ADMIN — ACETAMINOPHEN 1000 MG: 500 TABLET ORAL at 06:15

## 2021-05-13 RX ADMIN — ACETAMINOPHEN 1000 MG: 500 TABLET ORAL at 15:19

## 2021-05-13 RX ADMIN — DONEPEZIL HYDROCHLORIDE 10 MG: 10 TABLET, FILM COATED ORAL at 22:31

## 2021-05-13 RX ADMIN — DEXTROSE 15 G: 15 GEL ORAL at 22:27

## 2021-05-13 RX ADMIN — INSULIN LISPRO 9 UNITS: 100 INJECTION, SOLUTION INTRAVENOUS; SUBCUTANEOUS at 08:34

## 2021-05-13 RX ADMIN — GLIPIZIDE 10 MG: 10 TABLET ORAL at 11:29

## 2021-05-13 RX ADMIN — ACETAMINOPHEN 1000 MG: 500 TABLET ORAL at 22:31

## 2021-05-13 RX ADMIN — DEXTROSE MONOHYDRATE 12.5 G: 25 INJECTION, SOLUTION INTRAVENOUS at 23:10

## 2021-05-13 RX ADMIN — DIPYRIDAMOLE 25 MG: 25 TABLET, FILM COATED ORAL at 22:31

## 2021-05-13 RX ADMIN — ENOXAPARIN SODIUM 30 MG: 30 INJECTION SUBCUTANEOUS at 22:31

## 2021-05-13 RX ADMIN — ENOXAPARIN SODIUM 30 MG: 30 INJECTION SUBCUTANEOUS at 15:23

## 2021-05-13 RX ADMIN — IBUPROFEN 400 MG: 400 TABLET, FILM COATED ORAL at 22:30

## 2021-05-13 RX ADMIN — ALOGLIPTIN 6.25 MG: 6.25 TABLET, FILM COATED ORAL at 11:29

## 2021-05-13 RX ADMIN — METFORMIN HYDROCHLORIDE 1000 MG: 500 TABLET ORAL at 18:17

## 2021-05-13 RX ADMIN — SODIUM CHLORIDE, PRESERVATIVE FREE 10 ML: 5 INJECTION INTRAVENOUS at 09:00

## 2021-05-13 RX ADMIN — IBUPROFEN 400 MG: 400 TABLET, FILM COATED ORAL at 15:17

## 2021-05-13 RX ADMIN — ATORVASTATIN CALCIUM 20 MG: 20 TABLET, FILM COATED ORAL at 08:36

## 2021-05-13 ASSESSMENT — PAIN SCALES - GENERAL
PAINLEVEL_OUTOF10: 0
PAINLEVEL_OUTOF10: 4
PAINLEVEL_OUTOF10: 0

## 2021-05-13 NOTE — ED NOTES
Pt asleep on stretcher. NAD noted. RR even and nonlabored. Call light within reach. Will continue to monitor.          Gwendolyn Do RN  05/13/21 [id number]

## 2021-05-13 NOTE — PROGRESS NOTES
Trauma Tertiary Survey    Admit Date: 5/12/2021  Hospital day 0    Fall SH  Found down       Past Medical History:   Diagnosis Date    Dementia ([facility] [address]     Diabetes mellitus ([address] [address]     Fracture     proximal lt humerus. no surgery. 10/2018    Hyperlipidemia     Hypertension        Scheduled Meds:  Continuous Infusions:  PRN Meds:    Subjective:     Patient has no complaints at this time, however she has dementia. She answers \"no\" to all questions. She does not grimace or cry out with palpation of her head, neck, face, chest, abdomen, pelvis or extremities. She does have bruising and swelling over the right elbow and right femur with superficial abrasion over the right femur. Objective:     Patient Vitals for the past 8 hrs:   BP Pulse Resp SpO2   05/12/21 1831 (!) 153/80 86 18 98 %   05/12/21 1800 (!) 137/90 87 20 98 %   05/12/21 1746 (!) 146/80 75 19 98 %       No intake/output data recorded. No intake/output data recorded. Radiology:  Xr Elbow Right (min 3 Views)    Result Date: 5/12/2021  EXAMINATION: THREE XRAY VIEWS OF THE RIGHT ELBOW; ONE XRAY VIEW OF THE PELVIS;   XRAY VIEWS OF THE RIGHT FEMUR 5/12/2021 9:37 pm COMPARISON: None. HISTORY: ORDERING SYSTEM PROVIDED HISTORY: fall, swelling, dementia TECHNOLOGIST PROVIDED HISTORY: fall, swelling, dementia Reason for Exam: fall/ pain FINDINGS: Right elbow: No acute fracture or dislocation is detected. The osseous structures are intact and properly aligned. No concerning lytic or sclerotic lesion is identified. The visualized joints appear unremarkable. No elbow joint effusion. Right femur: No acute fracture or dislocation is detected. The osseous structures are intact and properly aligned. No concerning lytic or sclerotic lesion is identified. The visualized joints appear unremarkable. Pelvis: No fracture or dislocation is detected. Hip joint spaces are well preserved and anatomically aligned.   Mild degenerative changes of SI joint or pubic symphysis . No focal lytic or sclerotic lesion is detected. The sacrum is obscured by overlying bowel gas. No acute osseous abnormality within the right elbow and right femur and pelvis. No fracture. Mukesh Jeremias IMPRESSION: No acute osseous abnormality. IMPRESSION: No acute osseous abnormality. Xr Femur Right (min 2 Views)    Result Date: 5/12/2021  EXAMINATION: THREE XRAY VIEWS OF THE RIGHT ELBOW; ONE XRAY VIEW OF THE PELVIS;   XRAY VIEWS OF THE RIGHT FEMUR 5/12/2021 9:37 pm COMPARISON: None. HISTORY: ORDERING SYSTEM PROVIDED HISTORY: fall, swelling, dementia TECHNOLOGIST PROVIDED HISTORY: fall, swelling, dementia Reason for Exam: fall/ pain FINDINGS: Right elbow: No acute fracture or dislocation is detected. The osseous structures are intact and properly aligned. No concerning lytic or sclerotic lesion is identified. The visualized joints appear unremarkable. No elbow joint effusion. Right femur: No acute fracture or dislocation is detected. The osseous structures are intact and properly aligned. No concerning lytic or sclerotic lesion is identified. The visualized joints appear unremarkable. Pelvis: No fracture or dislocation is detected. Hip joint spaces are well preserved and anatomically aligned. Mild degenerative changes of SI joint or pubic symphysis . No focal lytic or sclerotic lesion is detected. The sacrum is obscured by overlying bowel gas. No acute osseous abnormality within the right elbow and right femur and pelvis. No fracture. Mukeshbright Parkm IMPRESSION: No acute osseous abnormality. IMPRESSION: No acute osseous abnormality. Xr Foot Left (min 3 Views)    Result Date: 5/12/2021  EXAMINATION: THREE XRAY VIEWS OF THE LEFT FOOT 5/12/2021 12:35 pm COMPARISON: Three-view study of the left foot from 05/26/2017 HISTORY: ORDERING SYSTEM PROVIDED HISTORY: fall, swelling TECHNOLOGIST PROVIDED HISTORY: fall, swelling Reason for Exam: patient fell, pain and swelling in left foot.  Acuity: Acute Type of Exam: Initial History of parietal lobe appear unchanged compatible with remote infarct. Periventricular white matter hypoattenuation bilaterally compatible chronic microvascular ischemic changes. ORBITS: The visualized portion of the orbits demonstrate no acute abnormality. SINUSES: The visualized paranasal sinuses and mastoid air cells demonstrate no acute abnormality. SOFT TISSUES/SKULL:  No acute abnormality of the visualized skull or soft tissues. Rounded calcification underlying the right parietal calvarium compatible with a meningioma, unchanged. 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 Areas of left FLACO territory encephalomalacia appear unchanged compatible with remote infarct. Findings were discussed with Dr. Althea Garcia at 2:14 pm on 5/12/2021 by Dr. Eric Warren. Ct Cervical Spine Wo Contrast    Result Date: 5/12/2021  EXAMINATION: CT OF THE CERVICAL SPINE WITHOUT CONTRAST 5/12/2021 1:47 pm TECHNIQUE: CT of the cervical spine was performed without the administration of intravenous contrast. Multiplanar reformatted images are provided for review. Dose modulation, iterative reconstruction, and/or weight based adjustment of the mA/kV was utilized to reduce the radiation dose to as low as reasonably achievable. COMPARISON: CT cervical spine August 11, 2019 HISTORY: ORDERING SYSTEM PROVIDED HISTORY: recurrent falls TECHNOLOGIST PROVIDED HISTORY: recurrent falls Decision Support Exception - unselect if not a suspected or confirmed emergency medical condition->Emergency Medical Condition (MA) Reason for Exam: AMS, falls, best images possible Acuity: Acute Type of Exam: Initial FINDINGS: BONES/ALIGNMENT: There is no acute fracture or traumatic malalignment. Stable mild grade 1 retrolisthesis of C5 on C6 which appears degenerative.  DEGENERATIVE CHANGES: Multilevel mild-to-moderate degenerative change of the cervical spine with degenerative disc disease most pronounced at the C5-C6 level. SOFT TISSUES: There is no prevertebral soft tissue swelling. Severe atherosclerotic disease of the partially imaged thoracic aorta. Paraseptal emphysematous changes noted. No acute abnormality of the cervical spine. Xr Chest Portable    Result Date: 5/12/2021  EXAMINATION: ONE XRAY VIEW OF THE CHEST 5/12/2021 12:35 pm COMPARISON: Chest radiograph 11/26/2020. HISTORY: ORDERING SYSTEM PROVIDED HISTORY: falls, AMS TECHNOLOGIST PROVIDED HISTORY: falls, AMS Reason for Exam: patient fell, AMS Acuity: Acute Type of Exam: Initial FINDINGS: Single portable view provided. There is moderate rotation to the right. Aortic atherosclerosis with mild ascending aortic ectasia. Normal heart size. Stable left 1 cm remote healed granulomatous disease. No acute consolidation. No effusion or pneumothorax. No free subdiaphragmatic air. Right shoulder glenohumeral arthropathy. No acute abnormality. Xr Pelvis (min 3 Views)    Result Date: 5/12/2021  EXAMINATION: THREE XRAY VIEWS OF THE RIGHT ELBOW; ONE XRAY VIEW OF THE PELVIS;   XRAY VIEWS OF THE RIGHT FEMUR 5/12/2021 9:37 pm COMPARISON: None. HISTORY: ORDERING SYSTEM PROVIDED HISTORY: fall, swelling, dementia TECHNOLOGIST PROVIDED HISTORY: fall, swelling, dementia Reason for Exam: fall/ pain FINDINGS: Right elbow: No acute fracture or dislocation is detected. The osseous structures are intact and properly aligned. No concerning lytic or sclerotic lesion is identified. The visualized joints appear unremarkable. No elbow joint effusion. Right femur: No acute fracture or dislocation is detected. The osseous structures are intact and properly aligned. No concerning lytic or sclerotic lesion is identified. The visualized joints appear unremarkable. Pelvis: No fracture or dislocation is detected. Hip joint spaces are well preserved and anatomically aligned.   Mild degenerative changes of SI joint or pubic symphysis . No focal lytic or sclerotic lesion is detected. The sacrum is obscured by overlying bowel gas. No acute osseous abnormality within the right elbow and right femur and pelvis. No fracture. Soy Skates IMPRESSION: No acute osseous abnormality. IMPRESSION: No acute osseous abnormality. Us Ed Fast Abdomen Limited    Result Date: 5/12/2021  POCUS_FAST:     Exam Information:         Exam type:  Clinically indicated     Indication(s) for Exam:         Other Indication(s):  Trauma Priority     Views Obtained & Images Saved for These Views: The following organs were examined as part of the FAST exam[de-identified]  The heart, diaphragms, liver, spleen, kidneys, and bladder were identified and examined. Findings:         Morison's pouch (RUQ):  Fluid absent         Splenorenal space (LUQ):  Fluid absent         Pericardial space:  Fluid absent         Pericardial tamponade?:  Absent         Retrovesicular space:  Fluid absent     Interpretation:         No pathologic free fluid     Confirmatory study:         What confirmatory study was done?:  CT         Confirmatory study findings[de-identified]  pending Electronically signed by Trung Bill on Wednesday, May 12, 2021 at 4:52 PM     Ct Chest Abdomen Pelvis Wo Contrast    Result Date: 5/12/2021  EXAMINATION: CT OF THE CHEST, ABDOMEN, AND PELVIS WITHOUT CONTRAST 5/12/2021 4:13 pm TECHNIQUE: CT of the chest, abdomen and pelvis was performed without the administration of intravenous contrast. Multiplanar reformatted images are provided for review. Dose modulation, iterative reconstruction, and/or weight based adjustment of the mA/kV was utilized to reduce the radiation dose to as low as reasonably achievable. COMPARISON: CT thoracic and lumbar spine 05/12/2021. CT PE and CT abdomen/pelvis exams 11/23/2020.  HISTORY: ORDERING SYSTEM PROVIDED HISTORY: trauma TECHNOLOGIST PROVIDED HISTORY: trauma Decision Support Exception - unselect if not a suspected or confirmed emergency medical condition->Emergency Medical Condition (MA) Reason for Exam: trauma FINDINGS: MEDIASTINUM:  Normal heart size with no significant pericardial effusion. Extensive triple-vessel distribution coronary artery calcifications. Extensive thoracic aortic atherosclerotic calcifications with 3.1 cm ectasia of the ascending thoracic aorta. No intramural hyperdensity or wall thickening. Esophagus is normal.  No mediastinal free air or hematoma. No significant mediastinal or hilar lymphadenopathy. Evidence of remote healed granulomatous disease. LUNGS/AIRWAYS:  The trachea and bronchi are patent. Mild pulmonary hyperinflation with emphysematous changes. No effusion or pneumothorax. Mild dependent atelectasis. Stable anterolateral lingular subpleural 0.8 cm calcified granuloma. No acute consolidation or interstitial changes. Stable mild biapical scarring. Stable right upper lobe lateral subpleural 3 mm solid nodule on axial image 25. ABDOMEN:  Prior cholecystectomy. The liver, spleen, common bile duct, pancreas, and adrenals are normal.  Nonobstructing left nephrolithiasis. The bilateral kidneys demonstrate no acute abnormality. The ureters are normal. Extensive abdominal aortic atherosclerosis with no aneurysm formation. The stomach and small bowel are normal.  Mild colonic fecal retention with no acute abnormality. No ascites or free air. No peritoneal/retroperitoneal hemorrhage. PELVIS:  The bladder and uterus are normal.  Mild fecal retention in the rectal vault with no acute abnormality. No significant acute intrapelvic hemorrhage. MUSCULOSKELETAL STRUCTURES:  The body wall soft tissues demonstrate mild edema. Mild asymmetric posttraumatic edema and hemorrhage along the left external obturator muscle and pectineus muscle. Decreased bone mineral density. Stable mild pronounced kyphosis of the upper thoracic spine with normal alignment and vertebral body heights.   Lower thoracic spine degenerative disc and joint disease. Right 3rd rib anterior aspect nondisplaced linear sclerosis suggesting subacute healing fracture. There are few other remote right posterolateral rib fractures. No acute displaced rib fracture. Normal lumbar spine alignment with multilevel degenerative changes. The bilateral sacroiliac joints demonstrate stable alignment with asymmetric joint spaces consistent with right-sided osteoarthritis. Normal bilateral hip alignment. There is an acute mildly displaced nonangulated left superior and inferior pubic rami fracture. The pubic symphysis demonstrates normal alignment. 1. No acute thoracic traumatic abnormality. 2. Acute mildly displaced nonangulated left superior inferior pubic rami fractures. Normal bilateral hip alignment with no acute proximal femur fracture. 3. No acute intra-abdominal abnormality. Ct Lumbar Spine Trauma Reconstruction    Result Date: 5/12/2021  EXAMINATION: CT OF THE LUMBAR SPINE WITHOUT CONTRAST  5/12/2021 TECHNIQUE: CT of the lumbar spine was performed without the administration of intravenous contrast. Multiplanar reformatted images are provided for review. Dose modulation, iterative reconstruction, and/or weight based adjustment of the mA/kV was utilized to reduce the radiation dose to as low as reasonably achievable. COMPARISON: None HISTORY: ORDERING SYSTEM PROVIDED HISTORY: trauma TECHNOLOGIST PROVIDED HISTORY: trauma Reason for Exam: trauma FINDINGS: BONES/ALIGNMENT: There is normal alignment of the spine. The vertebral body heights are maintained. No osseous destructive lesion is seen. DEGENERATIVE CHANGES: Moderate degenerative changes of lumbar spine at L2-L3, L3-L4 and L4-L5 with posterior osteophytic ridging. At L5-S1, moderate bilateral neural foraminal narrowing. SOFT TISSUES/RETROPERITONEUM: No paraspinal mass is seen. No acute osseous abnormality within the lumbar spine.  Moderate degenerative changes of lumbar spine at L2-L3, L3-L4 and L4-L5 with posterior osteophytic ridging. Ct Thoracic Spine Trauma Reconstruction    Result Date: 5/12/2021  EXAMINATION: CT OF THE THORACIC SPINE WITHOUT CONTRAST  5/12/2021 4:13 pm: TECHNIQUE: CT of the thoracic spine was performed without the administration of intravenous contrast. Multiplanar reformatted images are provided for review. Dose modulation, iterative reconstruction, and/or weight based adjustment of the mA/kV was utilized to reduce the radiation dose to as low as reasonably achievable. COMPARISON: CT thorax November 23, 2020. HISTORY: ORDERING SYSTEM PROVIDED HISTORY: trauma TECHNOLOGIST PROVIDED HISTORY: trauma Reason for Exam: trauma FINDINGS: BONES/ALIGNMENT: There is normal alignment of the spine except mild kypho scoliosis. The vertebral body heights are maintained except slight anterior wedging at T2 unchanged. No osseous destructive lesion is seen. Stable 4 x 7 mm sclerotic lesion within the vertebral body of T 3 on the left. DEGENERATIVE CHANGES: No gross spinal canal stenosis or bony neural foraminal narrowing of the thoracic spine. Mild spondylosis. SOFT TISSUES: No paraspinal mass is seen. Mild remote compression fracture T2. No acute disease otherwise. PHYSICAL EXAM:   GCS:  4 - Opens eyes on own   6 - Follows simple motor commands  4 - Seems confused, disoriented    Pupil size:  Left 3 mm Right 3 mm  Pupil reaction: Yes  Wiggles fingers: Left Yes Right Yes  Hand grasp:   Left normal   Right normal  Wiggles toes: Left Yes    Right Yes  Plantar flexion: Left normal  Right normal    /64   Pulse 77   Resp 19   SpO2 94%   General appearance: alert, appears stated age and cooperative, appears dehydrated  Head: Normocephalic, without obvious abnormality, atraumatic  Eyes: conjunctivae/corneas clear. PERRL, EOM's intact. Fundi benign. Nose: Nares normal. Septum midline. Mucosa normal. No drainage or sinus tenderness.   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

## 2021-05-13 NOTE — CARE COORDINATION
Case Management Initial Discharge Plan  Alexis Esquivel,         Spoke to pt's son Cortes (POA) to discuss discharge plans. Information verified: address, contacts, phone number, , insurance Yes, he approved pt's son Xiomara Lopez to be added to emergency contacts    Emergency Contact/Next of [address] name & number: pt's son Timothy Oliveira [phone]    PCP: No primary care provider on file. Date of last visit: pt sees Dr. Siobhan Jackson on [address] in [place]. Last seen 2 months ago    Insurance Provider: Medicare- primary, [address] [facility] Medicare- secondary    Discharge Planning    Living Arrangements:  Children   Support Systems:  [id number] has 2 story, split level  2 stairs to climb to get into front door, 6 stairs to climb to reach second floor  Location of bedroom/bathroom in home is on the second floor    Patient able to perform ADL's:Independent    Current Services (outpatient & in home) none  DME equipment: glucometer, shower chair  DME provider: n/a    Receiving oral anticoagulation therapy?   Yes, ASA 81mg daily    If indicated:   Physician managing anticoagulation treatment: PCP  Where does patient obtain lab work for ATC treatment? n/a      Potential Assistance Needed:  [facility]    Patient agreeable to home care: will provide list if needed  Freedom of choice provided:  n/a    Prior SNF/Rehab Placement and Facility: none  Agreeable to SNF/Rehab: Yes  [place] of choice provided: will provide list to POA tomorrow     Evaluation: no    Expected Discharge date:  21    Patient expects to be discharged to:  Home  Follow Up Appointment: Best Day/ Time:      Transportation provider: dependent on destination  Transportation arrangements needed for discharge: dependent on destination      Readmission Risk              Risk of Unplanned Readmission:        0         Does patient have a readmission risk score greater than 14?: No  If yes, follow-up appointment must be made within 7

## 2021-05-13 NOTE — PROGRESS NOTES
C- Spine Evaluation for Spine Clearance:    Pt is a 68 y.o. female who was admitted on 5/12/21 s/p multiple falls at home. Pt w/o complaints. C-Spine precautions of C-collar with spinal neutrality maintained since arrival with current exam directed at further evaluation of spine for clearance purposes. Pt chart and current images reviewed. CT C-Spine negative for acute fracture, subluxation, or traumatic injury. Patient does not have a distracting injury, is not acutely intoxicated and is alert, oriented and fully able to participate in exam.      Pt denies c-spine pain while resting in c-collar. C-collar removed w/ c-spine neutrality maintained. Pt denies midline pain with palpation of spinous processes and axial loading. Pt demonstrated full flexion, extension, and SB ROM without complaints of pain. TLS precautions of supine position maintained since arrival.  Pt denies midline pain with palpation of spinous processes. CT dorsal lumbar negative for acute fracture, subluxation, or traumatic injury. C-spine is considered cleared w/out need for further imaging, evaluation, or continuation of c-collar. TLS considered clear w/out need for further imagine, evaluation, or continuation of supine bedrest precautions.     Electronically signed by Bhaskar Champion DO on 5/12/2021 at 8:22 PM

## 2021-05-13 NOTE — ED NOTES
Pt asleep on stretcher. NAD noted. RR even and nonlabored. Call light within reach. Will continue to monitor.          Joseph Sands RN  05/13/21 1994

## 2021-05-13 NOTE — ED NOTES
Pt asleep on stretcher. NAD noted. RR even and nonlabored. Call light within reach. Will continue to monitor.            Ashley Alcantar RN  05/13/21 [id number]

## 2021-05-13 NOTE — ED NOTES
Pt resting on stretcher. NAD noted. RR even and nonlabored. Call light within reach. Will continue to monitor.        Merlene Morrison RN  05/13/21 1845

## 2021-05-13 NOTE — ED NOTES
Pt eating breakfast, no distress noted. Awaiting bed placement, will continue plan of care.       Franki Alfred RN  05/13/21 3647

## 2021-05-13 NOTE — PROGRESS NOTES
Skin is warm. Neurological:      Mental Status: She is alert. She is disoriented. Comments: Oriented x0           No intake/output data recorded. Drain/tube output:  No intake/output data recorded.     LAB:  CBC:   Recent Labs     05/12/21  1240 05/12/21  1603 05/13/21  0418   WBC 8.7 9.0 7.6   HGB 14.4 13.8 12.7   HCT 45.3 41.8 39.7   MCV 91.7 89.1 93.6    226 302     BMP:   Recent Labs     05/12/21  1240 05/12/21  1603 05/13/21  0418    140 141   K 3.9 4.1 3.6*    103 107   CO2 21 26 24   BUN 22 20 22   CREATININE 0.59 0.49* 0.46*   GLUCOSE 326* 276* 229*     COAGS:   Recent Labs     05/12/21  1240 05/12/21  1439 05/12/21  1603   APTT  --  28.5 21.8   PROT 7.4  --   --    INR  --  1.1 1.0       RADIOLOGY:  No new images      Darrel Whyte, APRN - CNP  5/13/21, 9:58 AM

## 2021-05-13 NOTE — ED NOTES
Pt resting on stretcher. NAD noted. RR even and nonlabored. Call light within reach. Will continue to monitor.        Arely Greenberg RN  05/13/21 0092

## 2021-05-13 NOTE — ED NOTES
Pt asleep on stretcher. NAD noted. RR even and nonlabored. Call light within reach. Will continue to monitor.          Shabana Kent RN  05/13/21 2027

## 2021-05-13 NOTE — ED NOTES
Pt asleep on stretcher. NAD noted. RR even and nonlabored. Call light within reach. Will continue to monitor.          Anthony Cordero RN  05/13/21 3746

## 2021-05-13 NOTE — PROGRESS NOTES
OM deficits noted. ST to follow up and provide treatment to address noted deficits. Education provided. Recommendations:  Requires SLP Intervention: Yes  Duration/Frequency of Treatment: 2-3x per week  D/C Recommendations: Further therapy recommended at discharge. Plan:   Goals:  Short-term Goals  Goal 1: Pt will recall biographical information with 90% accuracy  Goal 2: Pt will establish reliable yes/no response in 90% accuracy  Goal 3: Pt will follow 1-step commands with 90% accuracy  Goal 4: Pt will generate 3-5 units in agiven category with 90% accuracy  Goal 5: Pt will complete additional portions speech/language evaluation as able   Patient/family involved in developing goals and treatment plan: yes    Subjective:   Previous level of function and limitations:  General  Chart Reviewed: Yes  Family / Caregiver Present: Yes     Vision  Vision: Within Functional Limits  Hearing  Hearing: Within functional limits     Objective:  Oral/Motor  Oral Motor: Within functional limits    Auditory Comprehension  Comprehension: Exceptions  Yes/No Questions: Moderate(2/6, yes-bias noted)  One Step Basic Commands: Mild(2/3)    Expression  Primary Mode of Expression: Verbal    Verbal Expression  Verbal Expression: Exceptions to functional limits  Repetition: To be assessed in therapy  Automatic Speech: Severe(0/2 increased to 1/2 with mod verbal cues and model)  Confrontation: To be assessed in therapy  Divergent: Severe(0 units named in 30 seconds)  Responsive: To be assessed in therapy    Motor Speech  Motor Speech:  Within Functional Limits    Cognition:   Orientation  Overall Orientation Status: Impaired(Pt oriented to name only)    Prognosis:  Speech Therapy Prognosis  Prognosis: Fair  Individuals consulted  Consulted and agree with results and recommendations: Patient    Education:  Patient Education: yes  Patient Education Response: Verbalizes understanding          Therapy Time:   Individual Concurrent Group Co-treatment   Time In 0936         Time Out 0946         Minutes 14 Anna Griffith, [place]  5/13/2021 11:03 AM

## 2021-05-13 NOTE — CARE COORDINATION
SBIRT is deferred due to pt's diagnoses of dementia.           Alcohol Screening and Brief Intervention            Deferred [x]    Completed on: 5/13/2021   ARLENE Jett

## 2021-05-13 NOTE — ED NOTES
Report taken from [employer]. Pt resting on stretcher. NAD noted. RR even and nonlabored. Call light within reach. Will continue to monitor.          Vernell Lema RN  05/12/21 2010

## 2021-05-14 LAB
GLUCOSE BLD-MCNC: 101 MG/DL (ref 65–105)
GLUCOSE BLD-MCNC: 131 MG/DL (ref 65–105)
GLUCOSE BLD-MCNC: 141 MG/DL (ref 65–105)
GLUCOSE BLD-MCNC: 153 MG/DL (ref 65–105)
GLUCOSE BLD-MCNC: 249 MG/DL (ref 65–105)
GLUCOSE BLD-MCNC: 270 MG/DL (ref 65–105)
GLUCOSE BLD-MCNC: 271 MG/DL (ref 65–105)
GLUCOSE BLD-MCNC: 280 MG/DL (ref 65–105)
GLUCOSE BLD-MCNC: 326 MG/DL (ref 65–105)

## 2021-05-14 PROCEDURE — 6370000000 HC RX 637 (ALT 250 FOR IP): Performed by: NURSE PRACTITIONER

## 2021-05-14 PROCEDURE — 97167 OT EVAL HIGH COMPLEX 60 MIN: CPT

## 2021-05-14 PROCEDURE — 92507 TX SP LANG VOICE COMM INDIV: CPT

## 2021-05-14 PROCEDURE — 6370000000 HC RX 637 (ALT 250 FOR IP): Performed by: STUDENT IN AN ORGANIZED HEALTH CARE EDUCATION/TRAINING PROGRAM

## 2021-05-14 PROCEDURE — 97535 SELF CARE MNGMENT TRAINING: CPT

## 2021-05-14 PROCEDURE — 97530 THERAPEUTIC ACTIVITIES: CPT

## 2021-05-14 PROCEDURE — G0378 HOSPITAL OBSERVATION PER HR: HCPCS

## 2021-05-14 PROCEDURE — 96372 THER/PROPH/DIAG INJ SC/IM: CPT

## 2021-05-14 PROCEDURE — 82947 ASSAY GLUCOSE BLOOD QUANT: CPT

## 2021-05-14 PROCEDURE — 97162 PT EVAL MOD COMPLEX 30 MIN: CPT

## 2021-05-14 PROCEDURE — 6360000002 HC RX W HCPCS: Performed by: NURSE PRACTITIONER

## 2021-05-14 RX ORDER — IBUPROFEN 800 MG/1
400 TABLET ORAL EVERY 6 HOURS PRN
Status: DISCONTINUED | OUTPATIENT
Start: 2021-05-14 | End: 2021-05-15 | Stop reason: HOSPADM

## 2021-05-14 RX ORDER — ACETAMINOPHEN 500 MG
1000 TABLET ORAL EVERY 8 HOURS PRN
Status: DISCONTINUED | OUTPATIENT
Start: 2021-05-14 | End: 2021-05-15 | Stop reason: HOSPADM

## 2021-05-14 RX ADMIN — DIPYRIDAMOLE 25 MG: 25 TABLET, FILM COATED ORAL at 09:36

## 2021-05-14 RX ADMIN — DONEPEZIL HYDROCHLORIDE 10 MG: 10 TABLET, FILM COATED ORAL at 20:53

## 2021-05-14 RX ADMIN — IBUPROFEN 400 MG: 400 TABLET, FILM COATED ORAL at 01:30

## 2021-05-14 RX ADMIN — ENOXAPARIN SODIUM 30 MG: 30 INJECTION SUBCUTANEOUS at 20:53

## 2021-05-14 RX ADMIN — GLIPIZIDE 5 MG: 5 TABLET ORAL at 16:47

## 2021-05-14 RX ADMIN — IBUPROFEN 400 MG: 400 TABLET, FILM COATED ORAL at 05:33

## 2021-05-14 RX ADMIN — GLIPIZIDE 10 MG: 10 TABLET ORAL at 09:36

## 2021-05-14 RX ADMIN — ACETAMINOPHEN 1000 MG: 500 TABLET ORAL at 05:33

## 2021-05-14 RX ADMIN — IBUPROFEN 400 MG: 400 TABLET, FILM COATED ORAL at 16:47

## 2021-05-14 RX ADMIN — DIPYRIDAMOLE 25 MG: 25 TABLET, FILM COATED ORAL at 20:53

## 2021-05-14 RX ADMIN — ENOXAPARIN SODIUM 30 MG: 30 INJECTION SUBCUTANEOUS at 09:37

## 2021-05-14 RX ADMIN — LISINOPRIL 20 MG: 10 TABLET ORAL at 09:36

## 2021-05-14 RX ADMIN — DIPYRIDAMOLE 25 MG: 25 TABLET, FILM COATED ORAL at 14:28

## 2021-05-14 RX ADMIN — ATORVASTATIN CALCIUM 20 MG: 20 TABLET, FILM COATED ORAL at 09:36

## 2021-05-14 RX ADMIN — METFORMIN HYDROCHLORIDE 1000 MG: 500 TABLET ORAL at 09:36

## 2021-05-14 RX ADMIN — INSULIN LISPRO 2 UNITS: 100 INJECTION, SOLUTION INTRAVENOUS; SUBCUTANEOUS at 21:02

## 2021-05-14 RX ADMIN — ALOGLIPTIN 6.25 MG: 6.25 TABLET, FILM COATED ORAL at 09:36

## 2021-05-14 RX ADMIN — METFORMIN HYDROCHLORIDE 1000 MG: 500 TABLET ORAL at 16:47

## 2021-05-14 ASSESSMENT — PAIN SCALES - GENERAL
PAINLEVEL_OUTOF10: 0
PAINLEVEL_OUTOF10: 0
PAINLEVEL_OUTOF10: 5
PAINLEVEL_OUTOF10: 0

## 2021-05-14 NOTE — PLAN OF CARE
Problem: Skin Integrity:  Goal: Will show no infection signs and symptoms  Description: Will show no infection signs and symptoms  5/14/2021 0317 by Severo Braver, RN  Outcome: Ongoing  5/14/2021 0316 by Severo Braver, RN  Outcome: Ongoing  Goal: Absence of new skin breakdown  Description: Absence of new skin breakdown  5/14/2021 0317 by Severo Braver, RN  Outcome: Ongoing  5/14/2021 0316 by Severo Braver, RN  Outcome: Ongoing     Problem: Falls - Risk of:  Goal: Will remain free from falls  Description: Will remain free from falls  5/14/2021 0317 by Severo Braver, RN  Outcome: Ongoing  5/14/2021 0316 by Severo Braver, RN  Outcome: Ongoing  Goal: Absence of physical injury  Description: Absence of physical injury  5/14/2021 0317 by Severo Braver, RN  Outcome: Ongoing  5/14/2021 0316 by Severo Braver, RN  Outcome: Ongoing

## 2021-05-14 NOTE — DISCHARGE INSTR - COC
Continuity of Care Form    Patient Name: Kb Ramirez   :  1940  MRN:  [id number]    Admit date:  2021  Discharge date:  2021    Code Status Order: Full Code   Advance Directives:      Admitting Physician:  Colette Shipley MD  PCP: No primary care provider on file. Discharging Nurse: [address] Unit/Room#: 6048/6746-77  Discharging Unit Phone Number: [phone]    Emergency Contact:   Extended Emergency Contact Information  Primary Emergency Contact: Harsha [address] Phone: [phone]  Relation: Child  Secondary Emergency Contact: Olaf Lim  Vivian Phone: [phone]  Relation: Child    Past Surgical History:  No past surgical history on file. Immunization History:   Immunization History   Administered Date(s) Administered    Tdap (Boostrix, Adacel) 2017       Active Problems:  Patient Active Problem List   Diagnosis Code    Alzheimer's disease ([facility] [address] [address] G30.9, F02.80    Hypokalemia E87.6    Hypoglycemia E16.2    Community acquired pneumonia of left lower lobe of lung J18.9    Sepsis due to pneumonia ([address] J18.9, A41.9    Hypertension I10    Hyperlipidemia E78.5    Diabetes mellitus ([address] E11.9    Hyperglycemia R73.9    Hypercalcemia E83.52    Pneumonia due to organism J18.9    Fall from standing W19. XXXA    Closed fracture of superior pubic ramus ([facility] [address] S32.519A       Isolation/Infection:   Isolation            No Isolation          Patient Infection Status       Infection Onset Added Last Indicated Last Indicated By Review Planned Expiration Resolved Resolved By    None active    Resolved    COVID-19 Rule Out 21 COVID-19, Rapid (Ordered)   21 Rule-Out Test Resulted    COVID-19 Rule Out 20 Respiratory Panel, Molecular, with COVID-19 (Ordered)   20 Rule-Out Test Resulted    COVID-19 Rule Out 20 COVID-19 (Ordered)   20 Rule-Out Test Resulted            Nurse Assessment:  Last Vital Signs: /80   Pulse 74   Temp 97.6 °F (36.4 °C) (Oral)   Resp 18   Ht 6' (1.829 m)   Wt 120 lb (54.4 kg)   SpO2 100%   BMI 16.27 kg/m²     Last documented pain score (0-10 scale): Pain Level: 0  Last Weight:   Wt Readings from Last 1 Encounters:   05/13/21 120 lb (54.4 kg)     Mental Status:  disoriented    IV Access:  - None    Nursing Mobility/ADLs:  Walking   Assisted  Transfer  Assisted  Bathing  Assisted  Dressing  Assisted  Toileting  Assisted  Feeding  Independent  Med Admin  Assisted  Med Delivery   whole    Wound Care Documentation and Therapy:  Wound 11/24/20 Sacrum redness (Active)   Number of days: 171        Elimination:  Continence:   · Bowel: No  · Bladder: No  Urinary Catheter: None   Colostomy/Ileostomy/Ileal Conduit: No       Date of Last BM: 5/13/2021    Intake/Output Summary (Last 24 hours) at 5/14/2021 0904  Last data filed at 5/14/2021 0549  Gross per 24 hour   Intake 952 ml   Output 600 ml   Net 352 ml     I/O last 3 completed shifts: In: 26 [P.O.:952]  Out: 600 [Urine:600]    Safety Concerns:     History of Falls (last 30 days)    Impairments/Disabilities:      ***    Nutrition Therapy:  Current Nutrition Therapy:   - Oral Diet:  Carb Control 4 carbs/meal (1800kcals/day)    Routes of Feeding: Oral  Liquids: No Restrictions  Daily Fluid Restriction: no  Last Modified Barium Swallow with Video (Video Swallowing Test): not done    Treatments at the Time of Hospital Discharge:   Respiratory Treatments: n/a  Oxygen Therapy:  is not on home oxygen therapy.   Ventilator:    - No ventilator support    Rehab Therapies: Physical Therapy, Occupational Therapy and Speech/Language Therapy  Weight Bearing Status/Restrictions: Non-weight bearing on left leg  Other Medical Equipment (for information only, NOT a DME order):  walker and bedside commode  Other Treatments: ***    Patient's personal belongings (please select all that are sent with patient):  personal belongings    RN SIGNATURE:  Electronically signed by Jania Reis RN on 5/14/21 at 5:07 PM EDT    CASE MANAGEMENT/SOCIAL WORK SECTION    Inpatient Status Date: ***    Readmission Risk Assessment Score:  Readmission Risk              Risk of Unplanned Readmission:        0           Discharging to Facility/ Agency   · Name: [facility] [address] [place] [address]         Phone: [phone]       Fax: [phone]          Dialysis Facility (if applicable)   · Name:  · Address:  · Dialysis Schedule:  · Phone:  · Fax:    / signature: Electronically signed by Landen Piper RN on 5/14/21 at 3:32 PM EDT    PHYSICIAN SECTION    Prognosis: Good    Condition at Discharge: Stable    Rehab Potential (if transferring to Rehab): Good    Recommended Labs or Other Treatments After Discharge:     Physician Certification: I certify the above information and transfer of Pineda Avery  is necessary for the continuing treatment of the diagnosis listed and that she requires [facility] for less 30 days. Update Admission H&P: No change in H&P    PHYSICIAN SIGNATURE:  Electronically signed by PEYMAN Madera CNP on 5/14/21 at 9:10 AM EDT.

## 2021-05-14 NOTE — PROGRESS NOTES
([facility] [address] Diabetes mellitus ([facility] [address] Fracture, Hyperlipidemia, and Hypertension. has no past surgical history on file. Restrictions  Restrictions/Precautions  Restrictions/Precautions: Fall Risk, Weight Bearing  Required Braces or Orthoses?: No  Lower Extremity Weight Bearing Restrictions  Right Lower Extremity Weight Bearing: Weight Bearing As Tolerated  Position Activity Restriction  Other position/activity restrictions: up with assist; hx dementia  Vision/Hearing        Subjective  General  Patient assessed for rehabilitation services?: Yes  Family / Caregiver Present: No  Follows Commands: Within Functional Limits  Subjective  Subjective: RN and pt agreeable to PT eval  Pain Screening  Patient Currently in Pain: Denies  Pain Assessment  Pain Assessment: 0-10  Pain Level: 0  Vital Signs  Patient Currently in Pain: Denies     Orientation  Orientation  Overall Orientation Status: Impaired  Orientation Level: Disoriented X4  Social/Functional History  Social/Functional History  Lives With: Son  Type of Home: House  Home Layout: Two level, Bed/Bath upstairs  Home Access: Stairs to enter with rails  Entrance Stairs - Number of Steps: 2  ADL Assistance: Independent  Additional Comments: Above information obtained from Case Management note. Pt is a poor historian and states living alone in an apartment. Unable to obtain all PLOF based on pt poor cognition  Cognition      Objective     AROM RLE (degrees)  RLE AROM: WFL  AROM LLE (degrees)  LLE AROM : WFL  AROM RUE (degrees)  RUE AROM : WFL  AROM LUE (degrees)  LUE AROM : WFL  Strength RLE  Strength RLE: WFL  Strength LLE  Strength LLE: WFL  Strength RUE  Strength RUE: WFL  Strength LUE  Strength LUE: WFL        Bed mobility  Supine to Sit: Maximum assistance  Sit to Supine: Maximum assistance  Scooting: Maximal assistance  Transfers  Sit to Stand:  Moderate Assistance;2 Person Assistance  Stand to sit: Moderate Assistance;2 Person Assistance  Ambulation  Ambulation?: No  Ambulation 1  Surface: level tile  Device: Rolling Walker  Assistance:  Moderate assistance;2 Person assistance  Distance: sit to stand x mod assist x 2   The pt was not steady enough to attempt to take any steps     Balance  Posture: Good  Sitting - Static: Good  Sitting - Dynamic: Poor  Standing - Static: Poor        Plan   Plan  Times per week: 5-6x wk  Current Treatment Recommendations: Strengthening, Transfer Training, Balance Training, Gait Training, Cognitive/Perceptual Training, Functional Mobility Training, Stair training, Safety Education & Training  Safety Devices  Type of devices: Nurse notified, Left in bed, Call light within reach    G-Code     OutComes Score     AM-PAC Score  AM-PAC Inpatient Mobility Raw Score : 10 (05/14/21 1417)  AM-PAC Inpatient T-Scale Score : 32.29 (05/14/21 1417)  Mobility Inpatient CMS 0-100% Score: 76.75 (05/14/21 1417)  Mobility Inpatient CMS G-Code Modifier : CL (05/14/21 1417)        Goals  Short term goals  Time Frame for Short term goals: 10 visits  Short term goal 1: transfers with CGA  Short term goal 2: amb 50 ft with a RW x CGA  Short term goal 3: ascend/descend 4 steps with CGA  Short term goal 4: bed mobility with CGA  Patient Goals   Patient goals : Pt did not state goal    Therapy Time   Individual Concurrent Group Co-treatment   Time In 7718         Time Out 1335         Minutes 30            1 of [address], PT

## 2021-05-14 NOTE — PROGRESS NOTES
Occupational Therapy   Occupational Therapy Initial Assessment  Date: 2021   Patient Name: Clare Reyez  MRN: [id number]     : 1940     Note copied from Emergency Medicine: 17-year-old female brought in by squad for fall and mental status change. Patient is here with her  who is giving majority the history. Patient has dementia and has had some cognitive changes over the last couple of days.  reports that she has had less energy and been very weak the last couple of days. No fevers at home patient has had decreased p.o. intake but is still eating and drinking. Last night when patient was getting ready for bed she had fallen. No significant head injury or injuries per the . He states that he was able to get her up and into the bed but when he awoke this morning he found her on the floor. Patient is awake and cooperative however unable to answer any of my questions. She does not appear to be in any pain. Date of Service: 2021    Discharge Recommendations:  Patient would benefit from continued therapy after discharge       Assessment   Performance deficits / Impairments: Decreased functional mobility ; Decreased ADL status; Decreased safe awareness;Decreased cognition;Decreased balance;Decreased high-level IADLs;Decreased posture;Decreased endurance  Assessment: Pt tolerated session well this date. Pt severely limited by poor cognitive status this date. Pt with significant difficulty following simple commands; able to follow ~20% of commands with repetition and increased time. Pt requires Max A for bed mobility d/t pt decreased ability to initiate activity. Pt completed LB dressing, simple grooming, and toileting task this date requiring Mod A - total assistance; see below for more information. Pt will benefit from continued OT services at this time to address significant deficits listed.  At this time, pt would not be safe to return to prior residence based on level of assistance required. Prognosis: Fair  Decision Making: High Complexity  Patient Education: Pt educated on OT role, OT POC, safety awareness, bed mobility training, transfer training, DME use, orientation, and importance of continued OT. Poor return d/t poor cog  REQUIRES OT FOLLOW UP: Yes  Activity Tolerance  Activity Tolerance: Treatment limited secondary to decreased cognition  Safety Devices  Safety Devices in place: Yes  Type of devices: Nurse notified; Left in bed;Gait belt;Call light within reach; Bed alarm in place  Restraints  Initially in place: No           Patient Diagnosis(es): The primary encounter diagnosis was Fall from standing, initial encounter. Diagnoses of SDH (subdural hematoma) (HCC) and Closed fracture of multiple pubic rami, left, initial encounter [facility]) were also pertinent to this visit. has a past medical history of Dementia ([facility] [address] Diabetes mellitus ([facility] [address] Fracture, Hyperlipidemia, and Hypertension. has no past surgical history on file. Restrictions  Restrictions/Precautions  Restrictions/Precautions: Fall Risk, Weight Bearing  Required Braces or Orthoses?: No  Lower Extremity Weight Bearing Restrictions  Right Lower Extremity Weight Bearing: Weight Bearing As Tolerated  Position Activity Restriction  Other position/activity restrictions: up with assist; hx dementia    Subjective   General  Patient assessed for rehabilitation services?: Yes  Family / Caregiver Present: No  General Comment  Comments: RN ok'd pt for OT/PT eval this date.  Pt agreeable to session and pleasantly confused throughout  Patient Currently in Pain: Denies (unable to state; no s/s pain throughout session)    Social/Functional History  Social/Functional History  Lives With: Son  Type of Home: House  Home Layout: Two level, Bed/Bath upstairs  Home Access: Stairs to enter with rails  Entrance Stairs - Number of Steps: 2  ADL Assistance: Independent  Additional Comments: Above information obtained from Case Management note. Pt is a poor historian and states living alone in an apartment. Unable to obtain all PLOF based on pt poor cognition       Objective   Vision: (ANGELA formally d/t pt cognitive status)  Hearing: (ANGELA formally d/t pt cognitive status)    Orientation  Overall Orientation Status: Impaired  Orientation Level: Disoriented X4(pt answers to name 75% of time however unable to state name. Unable to state last name when provided with first name. Pt does not give appropriate responses when asked place, time, situation, birthdate often stating \"probably\" or \"I'm not sure\")    Balance  Sitting Balance: Contact guard assistance(Min A during dynamic sitting tasks; total sitting time ~20 minutes)  Standing Balance: Maximum assistance  Standing Balance  Time: ~3 minutes  Activity: standing at EOB, brief change  Comment: significant posterior lean, pt unable to correct despite verbal and tactile cues. Unsafe to attempt functional mobility d/t poor standing balance and tolerance  Functional Mobility  Functional Mobility Comments: ANGELA     ADL  Feeding: Minimal assistance;Setup;Verbal cueing; Increased time to complete  Grooming: Moderate assistance;Setup;Verbal cueing; Increased time to complete(when handed tissue, pt unable to initiate task of bringing tissue to nose. Later in session pt handed tissue again and able to bring to nose requiring Max increased time and VCs)  UE Bathing: Moderate assistance;Setup;Verbal cueing; Increased time to complete  LE Bathing: Moderate assistance;Setup;Verbal cueing; Increased time to complete  UE Dressing: Moderate assistance;Setup;Verbal cueing; Increased time to complete  LE Dressing: Moderate assistance;Setup;Verbal cueing; Increased time to complete(Pt requires Max VCs/TCs to initiate donning socks. Pt requires assitance threading B socks. Pt demonstrates increased initiation of donning second sock after completing first sock)  Toileting: Setup; Increased time to complete;Dependent/Total(Pt requires total assistance to doff soiled and don clean brief. Pt offering no assistance this date despite cues)  Tone RUE  RUE Tone: Normotonic  Tone LUE  LUE Tone: Normotonic  Coordination  Movements Are Fluid And Coordinated: Yes    Bed mobility  Supine to Sit: Maximum assistance  Sit to Supine: Maximum assistance  Scooting: Maximal assistance  Comment: Pt requires Max A to progress BLE and trunk. Pt with significant difficulty initiating bed mobility d/t poor cognition  Transfers  Sit to stand: Moderate assistance;2 Person assistance  Stand to sit: 2 Person assistance;Minimal assistance  Transfer Comments: pt with difficulty initiating sit > stand requiring Mod A X2; pt able to sit on 2nd command    Cognition  Overall Cognitive Status: Exceptions  Arousal/Alertness: Inconsistent responses to stimuli  Following Commands: Inconsistently follows commands  Attention Span: Unable to maintain attention  Memory: Decreased long term memory;Decreased short term memory;Decreased recall of recent events;Decreased recall of precautions;Decreased recall of biographical Information  Safety Judgement: Decreased awareness of need for safety;Decreased awareness of need for assistance  Problem Solving: Decreased awareness of errors  Insights: Not aware of deficits  Initiation: Requires cues for all  Sequencing: Requires cues for all       Sensation  Overall Sensation Status: (ANGELA d/t pt cognitive status and inability to appropriately answer questions)        LUE AROM (degrees)  LUE AROM : WFL  Left Hand AROM (degrees)  Left Hand AROM: WFL  RUE AROM (degrees)  RUE AROM : WFL  Right Hand AROM (degrees)  Right Hand AROM: WFL  LUE Strength  L Hand General: 4/5  LUE Strength Comment: ANGELA formally, pt unable to follow commands for MMT.  Based on observation to have good strength; able to pull on therapist throughout some functional tasks  RUE Strength  R Hand General: 4/5  RUE Strength Comment: ANGELA formally, pt unable to follow commands for MMT.  Based on observation to have good strength; able to pull on therapist throughout some functional tasks                   Plan   Plan  Times per week: 3-5 x/wk  Current Treatment Recommendations: Balance Training, Functional Mobility Training, Endurance Training, Cognitive Reorientation, Safety Education & Training, Patient/Caregiver Education & Training, Equipment Evaluation, Education, & procurement, Self-Care / ADL, Home Management Training    AM-PAC Score        AM-PAC Inpatient Daily Activity Raw Score: 12 (05/14/21 1419)  AM-PAC Inpatient ADL T-Scale Score : 30.6 (05/14/21 1419)  ADL Inpatient CMS 0-100% Score: 66.57 (05/14/21 1419)  ADL Inpatient CMS G-Code Modifier : CL (05/14/21 1419)    Goals  Short term goals  Time Frame for Short term goals: By discharge, pt will:  Short term goal 1: Demo Min A for bed mobility to increase independence and safety with ADLs/IADLs  Short term goal 2: Follow 50% of simple commands throughout therapy session to increase active participation in ADLs  Short term goal 3: Demo CGA for UB ADLs and grooming tasks with setup and Min VCs  Short term goal 4: Demo Min A for LB ADLs and toileting tasks with setup and Min VCs  Short term goal 5: Demo Min A throughout 8+ minutes standing activity to increase balance and ability to complete functional tasks  Short term goal 6: Demo Mod A for functional transfers and functional mobility with use of LRD PRN       Therapy Time   Individual Concurrent Group Co-treatment   Time In 1311         Time Out 1338         Minutes 27         Timed Code Treatment Minutes: [address]       Zoraida Runner, OTR/L

## 2021-05-14 NOTE — CARE COORDINATION
Transitional planning. Pt's son, Radha Baltazar brought in [facility] papers, placed in pt's chart. He requested that referral be sent to [facility], spoke to Herminia with admissions at [facility], CMS is waiving the inpatient 3-day stay for Medicare patients at this time. Referral sent. 1054 Crista with admissions at [facility] called, they have referral, after PT/OT notes are in, they will make a decision on acceptance. [address] [facility] with [facility] in [address] accept  Call [phone] to inform of discharge  Report: [phone]  [facility] PASSr both pages  COVID test on 5-12 no need for another. Eveline 80 to Alexandra Garcia with [facility], she will inform Crista with admissions of pt discharge and transport time of 9:30am on 5/15/21. Herminia will call if she has any questions. [facility] with admissions called, they are ready for pt tomorrow.     [address] faxed AVS/PRACHI/ JOSEMANUEL to [phone] informed pt's son Radha Baltazar of transport time tomorrow to [facility]

## 2021-05-14 NOTE — PROGRESS NOTES
PROGRESS NOTE          PATIENT NAME: Trinda Oppenheim  MEDICAL RECORD NO. [id number]  DATE: 2021  PRIMARY CARE PHYSICIAN: No primary care provider on file. HD: # 0    ASSESSMENT    Patient Active Problem List   Diagnosis    [facility]'s disease ([facility] [address]    Hypokalemia    Hypoglycemia    Community acquired pneumonia of left lower lobe of lung    Sepsis due to pneumonia ([facility] [address]    Hypertension    Hyperlipidemia    Diabetes mellitus ([facility] [address]    Hyperglycemia    Hypercalcemia    Pneumonia due to organism    Fall from standing    Closed fracture of superior pubic ramus [facility])       MEDICAL DECISION MAKING AND PLAN    Multiple falls    Old hygromas   NS consulted-CT reviewed. No new bleeds noted    Right superior pubic rami fracture   Ortho saw patient   No acute surgical intervention at this time   WBAT RLE    Dementia   Continue Aricept    Diabetes   Continue glipizide, alogliptin, metformin   Continue sliding scale    Pain management   Continue tylenol. DVT Prophylaxis   Lovenox    GI   Diabetic diet   supplements    Dispo   Pt/ot   SNF     Chief Complaint: \"none\"    SUBJECTIVE    Trinda Oppenheim was seen and examined at bedside. Patient denies pain. No acute events noted overnight. OBJECTIVE  VITALS: Temp: Temp: 97.6 °F (36.4 °C)Temp  Av.6 °F (36.4 °C)  Min: 97.4 °F (36.3 °C)  Max: 97.7 °F (19.3 °C) BP Systolic (52VVV), GHQ:002 , Min:118 , GUR:198   Diastolic (72UAK), YJQ:66, Min:66, Max:80   Pulse Pulse  Av.5  Min: 61  Max: 74 Resp Resp  Av  Min: 16  Max: 18 Pulse ox SpO2  Av %  Min: 94 %  Max: 100 %  Physical Exam  HENT:      Head: Normocephalic. Eyes:      Extraocular Movements: Extraocular movements intact. Cardiovascular:      Rate and Rhythm: Normal rate and regular rhythm. Pulses: Normal pulses. Pulmonary:      Effort: Pulmonary effort is normal.      Breath sounds: Normal breath sounds. Abdominal:      Palpations: Abdomen is soft.    Musculoskeletal: Normal range of motion. Skin:     General: Skin is warm. Neurological:      General: No focal deficit present. Mental Status: She is alert. I/O last 3 completed shifts: In: 952 [P.O.:952]  Out: 600 [Urine:600]    Drain/tube output: In: 952 [P.O.:952]  Out: 600 [Urine:600]    LAB:  CBC:   Recent Labs     05/12/21  1240 05/12/21  1603 05/13/21  0418   WBC 8.7 9.0 7.6   HGB 14.4 13.8 12.7   HCT 45.3 41.8 39.7   MCV 91.7 89.1 93.6    226 302     BMP:   Recent Labs     05/12/21  1240 05/12/21  1603 05/13/21  0418    140 141   K 3.9 4.1 3.6*    103 107   CO2 21 26 24   BUN 22 20 22   CREATININE 0.59 0.49* 0.46*   GLUCOSE 326* 276* 229*     COAGS:   Recent Labs     05/12/21  1240 05/12/21  1439 05/12/21  1603   APTT  --  28.5 21.8   PROT 7.4  --   --    INR  --  1.1 1.0       RADIOLOGY:  No new images      Electronically signed by Yennifer Aguilar DO on 5/14/2021 at 11:20 AM       Attending Note      I have reviewed the above [facility] note(s) and I either performed the key elements of the medical history and physical exam or was present with the trauma resident when the key elements of the medical history and physical exam were performed. I have discussed the findings, established the care plan and recommendations with the trauma team.  Doing well. Discharge planning.     aMtt Aguirre MD  5/14/2021  5:01 PM

## 2021-05-14 NOTE — PROGRESS NOTES
Speech Language Pathology  Speech Language Pathology  [facility]    Speech Language Treatment Note    Date: 2021  Patients Name: Clare Reyez  MRN: [id number]  Diagnosis:   Patient Active Problem List   Diagnosis Code    Alzheimer's disease ([facility] G30.9, F02.80    Hypokalemia E87.6    Hypoglycemia E16.2    Community acquired pneumonia of left lower lobe of lung J18.9    Sepsis due to pneumonia ([facility] J18.9, A41.9    Hypertension I10    Hyperlipidemia E78.5    Diabetes mellitus ([facility] E11.9    Hyperglycemia R73.9    Hypercalcemia E83.52    Pneumonia due to organism J18.9    Fall from standing W19. Kathy Kelch    Closed fracture of superior pubic ramus ([facility] S32.519A       Pain: 0/10    Speech and Language Treatment  Treatment time:  9:28-9:39    Subjective: [x] Alert [x] Cooperative     [] Confused     [] Agitated    [] Lethargic    Objective/Assessment:  Auditory Comprehension:   1-step commands: 2/4 increased to 3/4 with mod verbal and visual cues     Verbal Expression:   Automatic Associations: 2/4 increased to 4/4 with min verbal cues     Confrontational Namin/4, did not increase despite max verbal cues     Responsive Namin/2 did not increase despite max verbal and visual cues     Basic \"Who\" Questions: 0/3, increased to 1/3 with max verbal and visual cues     Plan:  [x] Continue ST services    [] Discharge from ST:      Discharge recommendations: Further therapy recommended at discharge.     Treatment completed by: ASHELY Alcala

## 2021-05-15 VITALS
TEMPERATURE: 98.1 F | SYSTOLIC BLOOD PRESSURE: 96 MMHG | DIASTOLIC BLOOD PRESSURE: 58 MMHG | OXYGEN SATURATION: 96 % | RESPIRATION RATE: 16 BRPM | HEIGHT: 72 IN | WEIGHT: 120 LBS | BODY MASS INDEX: 16.25 KG/M2 | HEART RATE: 82 BPM

## 2021-05-15 LAB
GLUCOSE BLD-MCNC: 103 MG/DL (ref 65–105)
GLUCOSE BLD-MCNC: 121 MG/DL (ref 65–105)

## 2021-05-15 PROCEDURE — 6370000000 HC RX 637 (ALT 250 FOR IP): Performed by: STUDENT IN AN ORGANIZED HEALTH CARE EDUCATION/TRAINING PROGRAM

## 2021-05-15 PROCEDURE — 96374 THER/PROPH/DIAG INJ IV PUSH: CPT

## 2021-05-15 PROCEDURE — 82947 ASSAY GLUCOSE BLOOD QUANT: CPT

## 2021-05-15 PROCEDURE — 96372 THER/PROPH/DIAG INJ SC/IM: CPT

## 2021-05-15 PROCEDURE — G0378 HOSPITAL OBSERVATION PER HR: HCPCS

## 2021-05-15 PROCEDURE — 6360000002 HC RX W HCPCS: Performed by: NURSE PRACTITIONER

## 2021-05-15 PROCEDURE — 6370000000 HC RX 637 (ALT 250 FOR IP): Performed by: NURSE PRACTITIONER

## 2021-05-15 RX ADMIN — ALOGLIPTIN 6.25 MG: 6.25 TABLET, FILM COATED ORAL at 08:36

## 2021-05-15 RX ADMIN — ATORVASTATIN CALCIUM 20 MG: 20 TABLET, FILM COATED ORAL at 08:37

## 2021-05-15 RX ADMIN — ENOXAPARIN SODIUM 30 MG: 30 INJECTION SUBCUTANEOUS at 08:38

## 2021-05-15 RX ADMIN — DIPYRIDAMOLE 25 MG: 25 TABLET, FILM COATED ORAL at 08:38

## 2021-05-15 RX ADMIN — GLIPIZIDE 10 MG: 10 TABLET ORAL at 08:37

## 2021-05-15 RX ADMIN — POLYETHYLENE GLYCOL 3350 17 G: 17 POWDER, FOR SOLUTION ORAL at 08:38

## 2021-05-15 RX ADMIN — METFORMIN HYDROCHLORIDE 1000 MG: 500 TABLET ORAL at 08:36

## 2021-05-15 ASSESSMENT — PAIN SCALES - GENERAL: PAINLEVEL_OUTOF10: 0

## 2021-05-15 NOTE — PLAN OF CARE
Problem: Musculor/Skeletal Functional Status  Goal: Highest potential functional level  Outcome: Ongoing     Problem: Skin Integrity:  Goal: Will show no infection signs and symptoms  Description: Will show no infection signs and symptoms  Outcome: Ongoing  Goal: Absence of new skin breakdown  Description: Absence of new skin breakdown  Outcome: Ongoing     Problem: Falls - Risk of:  Goal: Will remain free from falls  Description: Will remain free from falls  Outcome: Ongoing  Goal: Absence of physical injury  Description: Absence of physical injury  Outcome: Ongoing

## 2021-05-15 NOTE — PROGRESS NOTES
PROGRESS NOTE          PATIENT NAME: Aminata Aquino  MEDICAL RECORD NO. [id number]  DATE: 5/15/2021  PRIMARY CARE PHYSICIAN: No primary care provider on file. HD: # 0    ASSESSMENT    Patient Active Problem List   Diagnosis    Alzheimer's disease ([facility]    Hypokalemia    Hypoglycemia    Community acquired pneumonia of left lower lobe of lung    Sepsis due to pneumonia ([facility]    Hypertension    Hyperlipidemia    Diabetes mellitus ([facility]    Hyperglycemia    Hypercalcemia    Pneumonia due to organism    Fall from standing    Closed fracture of superior pubic ramus [facility])       MEDICAL DECISION MAKING AND PLAN    Multiple falls    Old hygromas   NS consulted-CT reviewed. No new bleeds noted. Signed off    Right superior pubic rami fracture   Ortho saw patient   No acute surgical intervention at this time   WBAT RLE    Dementia   Continue Aricept    Diabetes   Continue glipizide, alogliptin, metformin   Continue sliding scale    Pain management   Continue tylenol. DVT Prophylaxis   Lovenox    GI   Diabetic diet   supplements    Dispo   Pt/ot   SNF placement today    Chief Complaint: \"none\"    SUBJECTIVE    Aminata Aquino was seen and examined at bedside. Patient denies pain. No acute events noted overnight. OBJECTIVE  VITALS: Temp: Temp: 98.1 °F (36.7 °C)Temp  Av.9 °F (36.6 °C)  Min: 97.6 °F (36.4 °C)  Max: 98.1 °F (85.3 °C) BP Systolic (86GVW), TUF:99 , Min:96 , UOF:246   Diastolic (01ZJT), DCX:58, Min:58, Max:61   Pulse Pulse  Av  Min: 82  Max: 96 Resp Resp  Av  Min: 16  Max: 16 Pulse ox SpO2  Av.5 %  Min: 93 %  Max: 96 %  Physical Exam  HENT:      Head: Normocephalic. Eyes:      Extraocular Movements: Extraocular movements intact. Cardiovascular:      Rate and Rhythm: Normal rate and regular rhythm. Pulses: Normal pulses. Pulmonary:      Effort: Pulmonary effort is normal.      Breath sounds: Normal breath sounds. Abdominal:      Palpations: Abdomen is soft.    Skin:     General: Skin is warm. Neurological:      General: No focal deficit present. I/O last 3 completed shifts:  In: -   Out: 1150 [Urine:1150]    Drain/tube output: In: -   Out: 1150 [Urine:1150]    LAB:  CBC:   Recent Labs     05/12/21  1240 05/12/21  1603 05/13/21  0418   WBC 8.7 9.0 7.6   HGB 14.4 13.8 12.7   HCT 45.3 41.8 39.7   MCV 91.7 89.1 93.6    226 302     BMP:   Recent Labs     05/12/21  1240 05/12/21  1603 05/13/21  0418    140 141   K 3.9 4.1 3.6*    103 107   CO2 21 26 24   BUN 22 20 22   CREATININE 0.59 0.49* 0.46*   GLUCOSE 326* 276* 229*     COAGS:   Recent Labs     05/12/21  1240 05/12/21  1439 05/12/21  1603   APTT  --  28.5 21.8   PROT 7.4  --   --    INR  --  1.1 1.0       RADIOLOGY:  No new images      Electronically signed by Bi Uriarte DO on 5/15/2021 at 8:21 AM        Attending Note    Discharge today  I have reviewed the above TECSS note and I either performed the key elements of the procedure or was present with the resident when the key elements of the procedure were performed. I have discussed the findings, established the care plan and recommendations with resident.     Thor Herrera MD  5/15/2021  10:52 AM

## 2021-05-15 NOTE — PROGRESS NOTES
Report called to [facility]. No further questions or concerns. [facility] [address].

## 2021-05-17 LAB — GLUCOSE BLD-MCNC: 55 MG/DL (ref 65–105)

## 2021-07-08 ENCOUNTER — HOSPITAL ENCOUNTER (EMERGENCY)
Age: 81
Discharge: ANOTHER HOSPITAL | End: 2021-07-08
Attending: EMERGENCY MEDICINE
Payer: MEDICARE

## 2021-07-08 ENCOUNTER — APPOINTMENT (OUTPATIENT)
Dept: CT IMAGING | Age: 81
DRG: 083 | End: 2021-07-08
Payer: MEDICARE

## 2021-07-08 ENCOUNTER — APPOINTMENT (OUTPATIENT)
Dept: CT IMAGING | Age: 81
End: 2021-07-08
Payer: MEDICARE

## 2021-07-08 ENCOUNTER — HOSPITAL ENCOUNTER (INPATIENT)
Age: 81
LOS: 3 days | Discharge: HOME HEALTH | DRG: 083 | End: 2021-07-11
Attending: EMERGENCY MEDICINE | Admitting: SURGERY
Payer: MEDICARE

## 2021-07-08 ENCOUNTER — APPOINTMENT (OUTPATIENT)
Dept: GENERAL RADIOLOGY | Age: 81
DRG: 083 | End: 2021-07-08
Payer: MEDICARE

## 2021-07-08 VITALS
TEMPERATURE: 98.6 F | HEART RATE: 81 BPM | OXYGEN SATURATION: 97 % | SYSTOLIC BLOOD PRESSURE: 141 MMHG | RESPIRATION RATE: 11 BRPM | DIASTOLIC BLOOD PRESSURE: 68 MMHG

## 2021-07-08 DIAGNOSIS — N39.0 URINARY TRACT INFECTION WITHOUT HEMATURIA, SITE UNSPECIFIED: ICD-10-CM

## 2021-07-08 DIAGNOSIS — S06.5XAA SDH (SUBDURAL HEMATOMA): ICD-10-CM

## 2021-07-08 DIAGNOSIS — S06.5XAA SUBDURAL HEMATOMA: Primary | ICD-10-CM

## 2021-07-08 LAB
-: ABNORMAL
ABO/RH: NORMAL
ABSOLUTE EOS #: 0 K/UL (ref 0–0.4)
ABSOLUTE IMMATURE GRANULOCYTE: 0 K/UL (ref 0–0.3)
ABSOLUTE LYMPH #: 0.54 K/UL (ref 1–4.8)
ABSOLUTE MONO #: 0.31 K/UL (ref 0.2–0.8)
ALLEN TEST: ABNORMAL
AMORPHOUS: ABNORMAL
ANION GAP SERPL CALCULATED.3IONS-SCNC: 11 MMOL/L (ref 9–17)
ANION GAP SERPL CALCULATED.3IONS-SCNC: 17 MMOL/L (ref 9–17)
ANTIBODY SCREEN: NEGATIVE
ARM BAND NUMBER: NORMAL
BACTERIA: ABNORMAL
BASOPHILS # BLD: 1 %
BASOPHILS ABSOLUTE: 0.08 K/UL (ref 0–0.2)
BILIRUBIN URINE: NEGATIVE
BLOOD BANK SPECIMEN: ABNORMAL
BUN BLDV-MCNC: 18 MG/DL (ref 8–23)
BUN BLDV-MCNC: 22 MG/DL (ref 8–23)
BUN/CREAT BLD: 24 (ref 9–20)
CALCIUM SERPL-MCNC: 9.2 MG/DL (ref 8.6–10.4)
CARBOXYHEMOGLOBIN: 0.7 % (ref 0–5)
CASTS UA: ABNORMAL /LPF
CHLORIDE BLD-SCNC: 101 MMOL/L (ref 98–107)
CHLORIDE BLD-SCNC: 106 MMOL/L (ref 98–107)
CO2: 20 MMOL/L (ref 20–31)
CO2: 24 MMOL/L (ref 20–31)
COLOR: YELLOW
COMMENT UA: ABNORMAL
CREAT SERPL-MCNC: 0.57 MG/DL (ref 0.5–0.9)
CREAT SERPL-MCNC: 0.9 MG/DL (ref 0.5–0.9)
CRYSTALS, UA: ABNORMAL /HPF
DIFFERENTIAL TYPE: ABNORMAL
EOSINOPHILS RELATIVE PERCENT: 0 % (ref 1–4)
EPITHELIAL CELLS UA: ABNORMAL /HPF (ref 0–5)
ETHANOL PERCENT: <0.01 %
ETHANOL: <10 MG/DL
EXPIRATION DATE: NORMAL
FIO2: ABNORMAL
GFR AFRICAN AMERICAN: >60 ML/MIN
GFR AFRICAN AMERICAN: >60 ML/MIN
GFR NON-AFRICAN AMERICAN: >60 ML/MIN
GFR NON-AFRICAN AMERICAN: >60 ML/MIN
GFR SERPL CREATININE-BSD FRML MDRD: ABNORMAL ML/MIN/{1.73_M2}
GLUCOSE BLD-MCNC: 134 MG/DL (ref 65–105)
GLUCOSE BLD-MCNC: 209 MG/DL (ref 70–99)
GLUCOSE BLD-MCNC: 326 MG/DL (ref 70–99)
GLUCOSE URINE: ABNORMAL
HCG QUALITATIVE: ABNORMAL
HCO3 VENOUS: 25.3 MMOL/L (ref 24–30)
HCT VFR BLD CALC: 43.9 % (ref 36.3–47.1)
HCT VFR BLD CALC: 44 % (ref 36.3–47.1)
HEMOGLOBIN: 13.7 G/DL (ref 11.9–15.1)
HEMOGLOBIN: 13.9 G/DL (ref 11.9–15.1)
IMMATURE GRANULOCYTES: 0 %
INR BLD: 0.9
KETONES, URINE: ABNORMAL
LEUKOCYTE ESTERASE, URINE: ABNORMAL
LYMPHOCYTES # BLD: 7 % (ref 24–44)
MCH RBC QN AUTO: 28.1 PG (ref 25.2–33.5)
MCH RBC QN AUTO: 28.5 PG (ref 25.2–33.5)
MCHC RBC AUTO-ENTMCNC: 31.2 G/DL (ref 28.4–34.8)
MCHC RBC AUTO-ENTMCNC: 31.6 G/DL (ref 28.4–34.8)
MCV RBC AUTO: 90 FL (ref 82.6–102.9)
MCV RBC AUTO: 90.2 FL (ref 82.6–102.9)
METHEMOGLOBIN: ABNORMAL % (ref 0–1.5)
MODE: ABNORMAL
MONOCYTES # BLD: 4 % (ref 1–7)
MUCUS: ABNORMAL
NEGATIVE BASE EXCESS, VEN: ABNORMAL MMOL/L (ref 0–2)
NITRITE, URINE: NEGATIVE
NOTIFICATION TIME: ABNORMAL
NOTIFICATION: ABNORMAL
NRBC AUTOMATED: 0 PER 100 WBC
NRBC AUTOMATED: 0 PER 100 WBC
O2 DEVICE/FLOW/%: ABNORMAL
O2 SAT, VEN: 57.9 % (ref 60–85)
OTHER OBSERVATIONS UA: ABNORMAL
OXYHEMOGLOBIN: ABNORMAL % (ref 95–98)
PARTIAL THROMBOPLASTIN TIME: 21.9 SEC (ref 20.5–30.5)
PATIENT TEMP: 37
PCO2, VEN, TEMP ADJ: ABNORMAL MMHG (ref 39–55)
PCO2, VEN: 42.4 (ref 39–55)
PDW BLD-RTO: 13.3 % (ref 11.8–14.4)
PDW BLD-RTO: 13.4 % (ref 11.8–14.4)
PEEP/CPAP: ABNORMAL
PH UA: 5.5 (ref 5–8)
PH VENOUS: 7.39 (ref 7.32–7.42)
PH, VEN, TEMP ADJ: ABNORMAL (ref 7.32–7.42)
PLATELET # BLD: 238 K/UL (ref 138–453)
PLATELET # BLD: 272 K/UL (ref 138–453)
PLATELET ESTIMATE: ABNORMAL
PMV BLD AUTO: 10.2 FL (ref 8.1–13.5)
PMV BLD AUTO: 10.5 FL (ref 8.1–13.5)
PO2, VEN, TEMP ADJ: ABNORMAL MMHG (ref 30–50)
PO2, VEN: 33.5 (ref 30–50)
POSITIVE BASE EXCESS, VEN: 0.8 MMOL/L (ref 0–2)
POTASSIUM SERPL-SCNC: 4.1 MMOL/L (ref 3.7–5.3)
POTASSIUM SERPL-SCNC: 4.2 MMOL/L (ref 3.7–5.3)
PROTEIN UA: ABNORMAL
PROTHROMBIN TIME: 9.9 SEC (ref 9.1–12.3)
PSV: ABNORMAL
PT. POSITION: ABNORMAL
RBC # BLD: 4.88 M/UL (ref 3.95–5.11)
RBC # BLD: 4.88 M/UL (ref 3.95–5.11)
RBC # BLD: ABNORMAL 10*6/UL
RBC UA: ABNORMAL /HPF (ref 0–2)
RENAL EPITHELIAL, UA: ABNORMAL /HPF
RESPIRATORY RATE: ABNORMAL
SAMPLE SITE: ABNORMAL
SARS-COV-2, RAPID: NOT DETECTED
SEG NEUTROPHILS: 88 % (ref 36–66)
SEGMENTED NEUTROPHILS ABSOLUTE COUNT: 6.77 K/UL (ref 1.8–7.7)
SET RATE: ABNORMAL
SODIUM BLD-SCNC: 138 MMOL/L (ref 135–144)
SODIUM BLD-SCNC: 141 MMOL/L (ref 135–144)
SPECIFIC GRAVITY UA: 1.04 (ref 1–1.03)
SPECIMEN DESCRIPTION: NORMAL
TEXT FOR RESPIRATORY: ABNORMAL
TOTAL HB: ABNORMAL G/DL (ref 12–16)
TOTAL RATE: ABNORMAL
TRICHOMONAS: ABNORMAL
TURBIDITY: ABNORMAL
URINE HGB: ABNORMAL
UROBILINOGEN, URINE: NORMAL
VT: ABNORMAL
WBC # BLD: 7.7 K/UL (ref 3.5–11.3)
WBC # BLD: 7.7 K/UL (ref 3.5–11.3)
WBC # BLD: ABNORMAL 10*3/UL
WBC UA: ABNORMAL /HPF (ref 0–5)
YEAST: ABNORMAL

## 2021-07-08 PROCEDURE — 84520 ASSAY OF UREA NITROGEN: CPT

## 2021-07-08 PROCEDURE — 70450 CT HEAD/BRAIN W/O DYE: CPT

## 2021-07-08 PROCEDURE — 81001 URINALYSIS AUTO W/SCOPE: CPT

## 2021-07-08 PROCEDURE — 2580000003 HC RX 258: Performed by: STUDENT IN AN ORGANIZED HEALTH CARE EDUCATION/TRAINING PROGRAM

## 2021-07-08 PROCEDURE — 82565 ASSAY OF CREATININE: CPT

## 2021-07-08 PROCEDURE — 80051 ELECTROLYTE PANEL: CPT

## 2021-07-08 PROCEDURE — 80048 BASIC METABOLIC PNL TOTAL CA: CPT

## 2021-07-08 PROCEDURE — 82947 ASSAY GLUCOSE BLOOD QUANT: CPT

## 2021-07-08 PROCEDURE — 84703 CHORIONIC GONADOTROPIN ASSAY: CPT

## 2021-07-08 PROCEDURE — 86900 BLOOD TYPING SEROLOGIC ABO: CPT

## 2021-07-08 PROCEDURE — 85025 COMPLETE CBC W/AUTO DIFF WBC: CPT

## 2021-07-08 PROCEDURE — 51701 INSERT BLADDER CATHETER: CPT

## 2021-07-08 PROCEDURE — 80307 DRUG TEST PRSMV CHEM ANLYZR: CPT

## 2021-07-08 PROCEDURE — 87186 SC STD MICRODIL/AGAR DIL: CPT

## 2021-07-08 PROCEDURE — 85610 PROTHROMBIN TIME: CPT

## 2021-07-08 PROCEDURE — 96365 THER/PROPH/DIAG IV INF INIT: CPT

## 2021-07-08 PROCEDURE — 87086 URINE CULTURE/COLONY COUNT: CPT

## 2021-07-08 PROCEDURE — 6810039001 HC L1 TRAUMA PRIORITY

## 2021-07-08 PROCEDURE — APPSS60 APP SPLIT SHARED TIME 46-60 MINUTES: Performed by: REGISTERED NURSE

## 2021-07-08 PROCEDURE — 71260 CT THORAX DX C+: CPT

## 2021-07-08 PROCEDURE — 6370000000 HC RX 637 (ALT 250 FOR IP): Performed by: STUDENT IN AN ORGANIZED HEALTH CARE EDUCATION/TRAINING PROGRAM

## 2021-07-08 PROCEDURE — 82805 BLOOD GASES W/O2 SATURATION: CPT

## 2021-07-08 PROCEDURE — 73030 X-RAY EXAM OF SHOULDER: CPT

## 2021-07-08 PROCEDURE — 86850 RBC ANTIBODY SCREEN: CPT

## 2021-07-08 PROCEDURE — 85730 THROMBOPLASTIN TIME PARTIAL: CPT

## 2021-07-08 PROCEDURE — 71045 X-RAY EXAM CHEST 1 VIEW: CPT

## 2021-07-08 PROCEDURE — 3209999900 CT THORACIC SPINE TRAUMA RECONSTRUCTION

## 2021-07-08 PROCEDURE — 6360000002 HC RX W HCPCS: Performed by: NURSE PRACTITIONER

## 2021-07-08 PROCEDURE — 99285 EMERGENCY DEPT VISIT HI MDM: CPT

## 2021-07-08 PROCEDURE — 72125 CT NECK SPINE W/O DYE: CPT

## 2021-07-08 PROCEDURE — 85027 COMPLETE CBC AUTOMATED: CPT

## 2021-07-08 PROCEDURE — 73501 X-RAY EXAM HIP UNI 1 VIEW: CPT

## 2021-07-08 PROCEDURE — 99283 EMERGENCY DEPT VISIT LOW MDM: CPT

## 2021-07-08 PROCEDURE — 2580000003 HC RX 258: Performed by: NURSE PRACTITIONER

## 2021-07-08 PROCEDURE — 87641 MR-STAPH DNA AMP PROBE: CPT

## 2021-07-08 PROCEDURE — 86901 BLOOD TYPING SEROLOGIC RH(D): CPT

## 2021-07-08 PROCEDURE — 3209999900 CT LUMBAR SPINE TRAUMA RECONSTRUCTION

## 2021-07-08 PROCEDURE — 87077 CULTURE AEROBIC IDENTIFY: CPT

## 2021-07-08 PROCEDURE — 2000000000 HC ICU R&B

## 2021-07-08 PROCEDURE — 87635 SARS-COV-2 COVID-19 AMP PRB: CPT

## 2021-07-08 PROCEDURE — 6360000004 HC RX CONTRAST MEDICATION: Performed by: STUDENT IN AN ORGANIZED HEALTH CARE EDUCATION/TRAINING PROGRAM

## 2021-07-08 PROCEDURE — G0480 DRUG TEST DEF 1-7 CLASSES: HCPCS

## 2021-07-08 RX ORDER — NITROFURANTOIN 25; 75 MG/1; MG/1
100 CAPSULE ORAL EVERY 12 HOURS SCHEDULED
Status: DISCONTINUED | OUTPATIENT
Start: 2021-07-08 | End: 2021-07-09

## 2021-07-08 RX ORDER — SODIUM CHLORIDE 0.9 % (FLUSH) 0.9 %
5-40 SYRINGE (ML) INJECTION PRN
Status: DISCONTINUED | OUTPATIENT
Start: 2021-07-08 | End: 2021-07-11 | Stop reason: HOSPADM

## 2021-07-08 RX ORDER — POLYETHYLENE GLYCOL 3350 17 G/17G
17 POWDER, FOR SOLUTION ORAL DAILY
Status: DISCONTINUED | OUTPATIENT
Start: 2021-07-08 | End: 2021-07-11 | Stop reason: HOSPADM

## 2021-07-08 RX ORDER — SODIUM CHLORIDE 9 MG/ML
INJECTION, SOLUTION INTRAVENOUS CONTINUOUS
Status: DISCONTINUED | OUTPATIENT
Start: 2021-07-08 | End: 2021-07-09

## 2021-07-08 RX ORDER — DONEPEZIL HYDROCHLORIDE 10 MG/1
10 TABLET, FILM COATED ORAL NIGHTLY
Status: DISCONTINUED | OUTPATIENT
Start: 2021-07-08 | End: 2021-07-11 | Stop reason: HOSPADM

## 2021-07-08 RX ORDER — NYSTATIN 100000 U/G
CREAM TOPICAL 2 TIMES DAILY
Status: DISCONTINUED | OUTPATIENT
Start: 2021-07-08 | End: 2021-07-11 | Stop reason: HOSPADM

## 2021-07-08 RX ORDER — ONDANSETRON 2 MG/ML
4 INJECTION INTRAMUSCULAR; INTRAVENOUS EVERY 6 HOURS PRN
Status: DISCONTINUED | OUTPATIENT
Start: 2021-07-08 | End: 2021-07-09

## 2021-07-08 RX ORDER — ATORVASTATIN CALCIUM 20 MG/1
20 TABLET, FILM COATED ORAL DAILY
Status: DISCONTINUED | OUTPATIENT
Start: 2021-07-08 | End: 2021-07-11 | Stop reason: HOSPADM

## 2021-07-08 RX ORDER — SODIUM CHLORIDE 9 MG/ML
25 INJECTION, SOLUTION INTRAVENOUS PRN
Status: DISCONTINUED | OUTPATIENT
Start: 2021-07-08 | End: 2021-07-09

## 2021-07-08 RX ORDER — 0.9 % SODIUM CHLORIDE 0.9 %
1000 INTRAVENOUS SOLUTION INTRAVENOUS ONCE
Status: COMPLETED | OUTPATIENT
Start: 2021-07-08 | End: 2021-07-08

## 2021-07-08 RX ORDER — SODIUM CHLORIDE 0.9 % (FLUSH) 0.9 %
5-40 SYRINGE (ML) INJECTION EVERY 12 HOURS SCHEDULED
Status: DISCONTINUED | OUTPATIENT
Start: 2021-07-08 | End: 2021-07-11 | Stop reason: HOSPADM

## 2021-07-08 RX ADMIN — DONEPEZIL HYDROCHLORIDE 10 MG: 10 TABLET, FILM COATED ORAL at 21:49

## 2021-07-08 RX ADMIN — IOPAMIDOL 130 ML: 755 INJECTION, SOLUTION INTRAVENOUS at 17:17

## 2021-07-08 RX ADMIN — SODIUM CHLORIDE, PRESERVATIVE FREE 10 ML: 5 INJECTION INTRAVENOUS at 21:53

## 2021-07-08 RX ADMIN — CEFTRIAXONE 1000 MG: 1 INJECTION, POWDER, FOR SOLUTION INTRAMUSCULAR; INTRAVENOUS at 14:32

## 2021-07-08 RX ADMIN — SODIUM CHLORIDE 1000 ML: 9 INJECTION, SOLUTION INTRAVENOUS at 12:55

## 2021-07-08 RX ADMIN — SODIUM CHLORIDE 100 ML/HR: 9 INJECTION, SOLUTION INTRAVENOUS at 20:49

## 2021-07-08 ASSESSMENT — ENCOUNTER SYMPTOMS
DIARRHEA: 0
CONSTIPATION: 0
WHEEZING: 0
COLOR CHANGE: 0
SINUS PRESSURE: 0
VOMITING: 0
COUGH: 0
RHINORRHEA: 0
SORE THROAT: 0
ABDOMINAL PAIN: 0
SHORTNESS OF BREATH: 0
NAUSEA: 0

## 2021-07-08 ASSESSMENT — PAIN SCALES - GENERAL: PAINLEVEL_OUTOF10: 0

## 2021-07-08 NOTE — H&P
TRAUMA HISTORY AND PHYSICAL EXAMINATION  (V 2.0)    PATIENT NAME: Darnell Daniels  YOB: 1940  MEDICAL RECORD NO. [id number]   DATE: 7/8/2021  PRIMARY CARE PHYSICIAN: Guillermo Neff MD  PATIENT EVALUATED AT THE REQUEST OF :   Peggy DAVIS   []Trauma Alert     [x] Trauma Priority     []Trauma Consult. IMPRESSION:     Patient Active Problem List   Diagnosis    Alzheimer's disease ([facility]    Hypokalemia    Hypoglycemia    Community acquired pneumonia of left lower lobe of lung    Sepsis due to pneumonia ([facility]    Hypertension    Hyperlipidemia    Diabetes mellitus ([facility]    Hyperglycemia    Hypercalcemia    Pneumonia due to organism    Fall from standing    Closed fracture of superior pubic ramus ([facility]    Head injury    SDH (subdural hematoma) ([facility]       Fall transfer from [facility] acute subdural hematomas, +loc, dementia, no anticoagulation, no antiplatelet     MEDICAL DECISION MAKING AND PLAN:       Plan:   Neurosurgery consulted - will see the patient   Admit to ICU    -F/u labs     [place] [id number]    [x] Neurosurgery     [] Orthopedic Surgery    [] Cardiothoracic     [] Facial Trauma    [] Plastic Surgery (Burn)    [] Pediatric Surgery     [] Internal Medicine    [] Pulmonary Medicine    [] Other:       HISTORY:     Chief complaint:  \"Fall transfer for subdural\"    SOURCE OF INFORMATION  Patient information was obtained from EMS personnel. History/Exam limitations: dementia. INJURY SUMMARY  Acute frontal subdural hematomas   Right shoulder contusion w/ suspected effusion      GENERAL DATA  Age 68 y.o.  female   Patient information was obtained from EMS personnel. History/Exam limitations: dementia.   Patient presented to the Emergency Department by ambulance where the patient received see Ambulance Run Sheet prior to arrival.  Injury Date: 7/8/2021   Approximate Injury Time:        Transport mode:   []Ambulance      [] Helicopter     []Car       [] Other  Referring Hospital: [address] [place] [address]. Niraj    INJURY LOCATION, (e.g., home, farm, industry, street)  Specific Details of Location (e.g., bedroom, kitchen, garage): home      MECHANISM OF INJURY    CHILD:  []Booster Seat  []Infant Car Seat  [] Child Car Seat   []Motorcycle Collision Wearing Helmet     []Yes     []No    []Unknown  []Bicycle Collision Wearing Helmet     []Yes     []No    []Unknown  []Pedestrian Struck      [x]Fall    [x]From Standing     []From Height   Ft     []Down Stairs  []Assault  []Gunshot  Specify caliber / type of gun: ____________________________  []Stabbing  Specify weapon type, size: _____________________________  []Burn     []Flame   []Scald   []Electrical   []Chemical           []Contact   []Inhalation   []House fire  []Other ______________________________________________________  []Other protective devices used / worn ___________________________    HISTORY:   Unable to fully assess due to patient's baseline dementia     Loss of Consciousness []No   [x]Yes Duration(min) unknown   Total Fluids Given Prior To Arrival  cc    MEDICATIONS:   []  None     []  Information not available due to exam limitations documented above  Prior to Admission medications    Medication Sig Start Date End Date Taking?  Authorizing Provider   vitamin D (ERGOCALCIFEROL) 1.25 MG (59915 UT) CAPS capsule Take 1 capsule by mouth once a week 11/30/20   PEYMAN Stevens NP   dipyridamole (PERSANTINE) 25 MG tablet Take 25 mg by mouth 3 times daily     Historical Provider, MD   SITagliptin (JANUVIA) 25 MG tablet Take 25 mg by mouth daily    Historical Provider, MD   potassium chloride (KLOR-CON M) 20 MEQ extended release tablet Take 1 tablet by mouth daily (with breakfast) 5/30/17   Amber Montes MD   atorvastatin (LIPITOR) 20 MG tablet Take 20 mg by mouth daily    Historical Provider, MD   donepezil (ARICEPT) 10 MG tablet Take 10 mg by mouth nightly    Historical Provider, MD   glipiZIDE (GLUCOTROL) 5 MG tablet Take 5-10 mg by mouth See Admin Instructions Indications: Takes 10mg AM and 5mg PM     Historical Provider, MD   lisinopril (PRINIVIL;ZESTRIL) 20 MG tablet Take 20 mg by mouth daily    Historical Provider, MD   metFORMIN (GLUCOPHAGE) 1000 MG tablet Take 1,000 mg by mouth 2 times daily (with meals)    Historical Provider, MD       ALLERGIES:   []  None    []   Information not available due to exam limitations documented above   Penicillins and Tramadol    PAST MEDICAL HISTORY: []  None   []   Information not available due to exam limitations documented above    has a past medical history of Dementia ([facility] Diabetes mellitus ([facility] Fracture, Hyperlipidemia, and Hypertension. has no past surgical history on file. FAMILY HISTORY   []   Information not available due to exam limitations documented above    Family history is unknown by patient. SOCIAL HISTORY  []   Information not available due to exam limitations documented above     reports that she has quit smoking. She has never used smokeless tobacco.   reports no history of alcohol use. reports no history of drug use. PERTINENT SYSTEMIC REVIEW:  []   Information not available due to exam limitations documented above    General: Denies fever, chills, night sweats, weight loss, malaise, fatigue  HEENT: Denies sore throat, sinus problems, allergic rhinosinusitis  Card: Denies chest pain, palpitations, orthopnea/PND. Denies h/o murmurs  Pulm: Denies cough, shortness of breath, YOO  GI:  per HPI; denies history of constipation, diarrhea, hematochezia or melena  : Denies polyuria, dysuria, hematuria  Endo: Denies diabetes, thyroid problems. Heme: Denies anemia, h/o bleeding or clotting problems. Neuro: Denies h/o CVA, TIA  Skin: Denies rashes, ulcers  Musculoskeletal: Denies muscle, joint, back pain.       PHYSICAL EXAMINATION:   GLASCOW COMA SCALE  NEUROMUSCULAR BLOCKADE PRIOR TO ARRIVAL     [x]No        []Yes      Variable  Score   Variable  Score  Eye opening [x]Spontaneous 4 Verbal []Oriented  5     []To voice  3   []Confused  4    []To pain  2   [x]Inapp words  3    []None  1   []Incomp words 2       []None  1   Motor   [x]Obeys  6    []Localizes pain 5    []Withdraws(pain) 4    []Flexion(pain) 3  []Extension(pain) 2    []None  1     GCS Total = 13    PHYSICAL EXAMINATION  VITAL SIGNS: There were no vitals filed for this visit. General Appearance: alert, answers questions intermittently   Skin: warm and dry, no rash or erythema  Head: normocephalic and atraumatic  Eyes: pupils equal, round, and reactive to light, extraocular eye movements intact, conjunctivae normal  ENT: tympanic membrane, external ear and ear canal normal bilaterally, nose without deformity, nasal mucosa and turbinates normal without polyps  Neck: C-collar present supple and non-tender without mass, no thyromegaly or thyroid nodules, no cervical lymphadenopathy  Pulmonary/Chest: Equal breath sounds b/l  Cardiovascular: S1S2  Abdomen: soft, non-tender, non-distended  Pelvis:  Stable  Back:  No abrasions/lesions. No obvious deformities. No TTP. No step-offs  Rectal: Sphincter tone intact, No gross blood  Extremities: palpable radial, femoral, and pedal pulses b/l  Musculoskeletal: normal range of motion, no joint swelling, deformity or tenderness  Neurologic: reflexes normal and symmetric, no cranial nerve deficit, gait, coordination and speech normal    FOCUSED ABDOMINAL SONOGRAM FOR TRAUMA (FAST): A good  quality examination was performed by Dr. Juarez Martins and representative images were obtained. [x] No free fluid in the abdomen       RADIOLOGY  CT THORACIC SPINE TRAUMA RECONSTRUCTION   Final Result   There are no acute traumatic findings within of the chest, abdomen, or pelvis. Healing nondisplaced subacute fractures of the T12 superior endplate, of the   left pubic rami, and of the left sacral ala noted. Degenerative changes of   the thoracic and lumbar spine. No acute fracture.          CT LUMBAR SPINE TRAUMA RECONSTRUCTION   Final Result   There are no acute traumatic findings within of the chest, abdomen, or pelvis. Healing nondisplaced subacute fractures of the T12 superior endplate, of the   left pubic rami, and of the left sacral ala noted. Degenerative changes of   the thoracic and lumbar spine. No acute fracture. CT CHEST ABDOMEN PELVIS W CONTRAST   Final Result   There are no acute traumatic findings within of the chest, abdomen, or pelvis. Healing nondisplaced subacute fractures of the T12 superior endplate, of the   left pubic rami, and of the left sacral ala noted. Degenerative changes of   the thoracic and lumbar spine. No acute fracture. CT CERVICAL SPINE WO CONTRAST   Final Result   No acute abnormality of the cervical spine. XR CHEST PORTABLE   Final Result   Chest: No acute cardiopulmonary process. However, there is a lucency through   the greater tuberosity of the humerus suspicious for nondisplaced fracture. Correlate clinically. Right shoulder: Osteopenia. Advanced arthritic change. No acute fracture or   dislocation. Suspected effusion. XR SHOULDER RIGHT (MIN 2 VIEWS)   Final Result   Chest: No acute cardiopulmonary process. However, there is a lucency through   the greater tuberosity of the humerus suspicious for nondisplaced fracture. Correlate clinically. Right shoulder: Osteopenia. Advanced arthritic change. No acute fracture or   dislocation. Suspected effusion. XR HIP RIGHT (1 VIEW)   Final Result   No evidence of an acute fracture or traumatic malalignment involving the   right hip.          CT HEAD WO CONTRAST    (Results Pending)       LABS  Labs Reviewed   TRAUMA PANEL - Abnormal; Notable for the following components:       Result Value    Glucose 209 (*)     O2 Sat, Efrain 57.9 (*)     All other components within normal limits   COVID-19, RAPID   COVID-19, RAPID   URINE DRUG SCREEN   URINALYSIS   TYPE AND SCREEN Jaleesa Clemens MD  7/8/21, 7:06 PM       Attending Note      I have reviewed the above GCS note(s) and I either performed the key elements of the medical history and physical exam or was present with the trauma resident when the key elements of the medical history and physical exam were performed. I have discussed the findings, established the care plan and recommendations with the trauma team.  Elderly female, poor historian with dementia. Majority of info from prior charts. Will admit, consult NS for SDH. R/o associated injury and clarify code status. Will admit to ICU and consult  for discharge planning.     Levi Alfonso MD  7/8/2021  7:34 PM

## 2021-07-08 NOTE — ED PROVIDER NOTES
[facility] ED  Emergency Department Encounter  EmergencyMedicine Resident     Pt Name:Alondra Irby  MRN: [id number]  Jeanettegfyvonne 1940  Date of evaluation: 7/8/21  PCP:  Vicente Connor MD    This patient was evaluated in the Emergency Department for symptoms described in the history of present illness. The patient was evaluated in the context of the global COVID-19 pandemic, which necessitated consideration that the patient might be at risk for infection with the SARS-CoV-2 virus that causes COVID-19. Institutional protocols and algorithms that pertain to the evaluation of patients at risk for COVID-19 are in a state of rapid change based on information released by regulatory bodies including the CDC and federal and state organizations. These policies and algorithms were followed during the patient's care in the ED. CHIEF COMPLAINT       Chief Complaint   Patient presents with    Fall       HISTORY OF PRESENT ILLNESS  (Location/Symptom, Timing/Onset, Context/Setting, Quality, Duration, Modifying Factors, Severity.)      Drake Blancas is a 68 y.o. female who presents with fall. Patient has history of Alzheimer's, lives at home with her son, son found patient lying on the floor this morning, brought her [facility], found to have subdural hematoma, transferred to [facility] for neurosurgical evaluation. Patient is GCS 14, either advanced Alzheimer's or significantly altered, unable to participate in HPI and ROS. PAST MEDICAL / SURGICAL / SOCIAL / FAMILY HISTORY      has a past medical history of Dementia ([address] Diabetes mellitus ([address] Fracture, Hyperlipidemia, and Hypertension. has no past surgical history on file. Social History     Socioeconomic History    Marital status:       Spouse name: Not on file    Number of children: Not on file    Years of education: Not on file    Highest education level: Not on file   Occupational History    Not on file   Tobacco Use  Smoking status: Former Smoker    Smokeless tobacco: Never Used    Tobacco comment: quit many years ago, cannot remember date   Substance and Sexual Activity    Alcohol use: No    Drug use: No    Sexual activity: Not on file   Other Topics Concern    Not on file   Social History Narrative    Not on file     Social Determinants of Health     Financial Resource Strain:     Difficulty of Paying Living Expenses:    Food Insecurity:     Worried About Running Out of Food in the Last Year:     [address] in the Last Year:    Transportation Needs:     Lack of Transportation (Medical):  Lack of Transportation (Non-Medical):    Physical Activity:     Days of Exercise per Week:     Minutes of Exercise per Session:    Stress:     Feeling of Stress :    Social Connections:     Frequency of Communication with Friends and Family:     Frequency of Social Gatherings with Friends and Family:     Attends Jewish Services:     Active Member of Clubs or Organizations:     Attends Club or Organization Meetings:     Marital Status:    Intimate Partner Violence:     Fear of Current or Ex-Partner:     Emotionally Abused:     Physically Abused:     Sexually Abused:        Family History   Family history unknown: Yes       Allergies:  Penicillins and Tramadol    Home Medications:  Prior to Admission medications    Medication Sig Start Date End Date Taking?  Authorizing Provider   vitamin D (ERGOCALCIFEROL) 1.25 MG (23450 UT) CAPS capsule Take 1 capsule by mouth once a week 11/30/20   PEYMAN Jones - NP   dipyridamole (PERSANTINE) 25 MG tablet Take 25 mg by mouth 3 times daily     Historical Provider, MD   SITagliptin (JANUVIA) 25 MG tablet Take 25 mg by mouth daily    Historical Provider, MD   potassium chloride (KLOR-CON M) 20 MEQ extended release tablet Take 1 tablet by mouth daily (with breakfast) 5/30/17   Elliot Kaur MD   atorvastatin (LIPITOR) 20 MG tablet Take 20 mg by mouth daily Effort: Pulmonary effort is normal. No respiratory distress. Breath sounds: Normal breath sounds. No stridor. No wheezing, rhonchi or rales. Chest:      Chest wall: No tenderness. Abdominal:      General: There is no distension. Palpations: Abdomen is soft. There is no mass. Tenderness: There is no abdominal tenderness. There is no right CVA tenderness, left CVA tenderness or guarding. Musculoskeletal:         General: Tenderness present. Normal range of motion. Cervical back: Normal range of motion and neck supple. No rigidity or tenderness. Right lower leg: No edema. Left lower leg: No edema. Comments: Right shoulder ecchymosis and tenderness   Skin:     General: Skin is warm. Capillary Refill: Capillary refill takes less than 2 seconds. Neurological:      Mental Status: She is alert and oriented to person, place, and time. Cranial Nerves: No cranial nerve deficit. Sensory: No sensory deficit. Motor: No weakness.    Psychiatric:         Behavior: Behavior normal.          DIFFERENTIAL  DIAGNOSIS     PLAN (LABS / IMAGING / EKG):  Orders Placed This Encounter   Procedures    Neck Brace [place]    COVID-19, Rapid    COVID-19, Rapid    CT THORACIC SPINE TRAUMA RECONSTRUCTION    CT LUMBAR SPINE TRAUMA RECONSTRUCTION    CT CHEST ABDOMEN PELVIS W CONTRAST    XR CHEST PORTABLE    XR SHOULDER RIGHT (MIN 2 VIEWS)    XR HIP RIGHT (1 VIEW)    CT CERVICAL SPINE WO CONTRAST    CT HEAD WO CONTRAST    Trauma Panel    Urine Drug Screen    Urinalysis    Inpatient consult to Neurosurgery    Type and Screen    PATIENT STATUS (FROM ED OR OR/PROCEDURAL) Inpatient    Orthopedic spinal precautions       MEDICATIONS ORDERED:  Orders Placed This Encounter   Medications    iopamidol (ISOVUE-370) 76 % injection 130 mL       DDX:     DIAGNOSTIC RESULTS / EMERGENCY DEPARTMENT COURSE / MDM   LAB RESULTS:  Results for orders placed or performed during the hospital encounter of 07/08/21   COVID-19, Rapid    Specimen: Nasopharyngeal Swab   Result Value Ref Range    Specimen Description . NASOPHARYNGEAL SWAB     SARS-CoV-2, Rapid Not Detected Not Detected   Trauma Panel   Result Value Ref Range    Ethanol <10 <10 mg/dL    Ethanol percent <0.010 <0.010 %    Blood Bank Specimen BILL FOR SERVICES PERFORMED     BUN 18 8 - 23 mg/dL    WBC 7.7 3.5 - 11.3 k/uL    RBC 4.88 3.95 - 5.11 m/uL    Hemoglobin 13.7 11.9 - 15.1 g/dL    Hematocrit 43.9 36.3 - 47.1 %    MCV 90.0 82.6 - 102.9 fL    MCH 28.1 25.2 - 33.5 pg    MCHC 31.2 28.4 - 34.8 g/dL    RDW 13.3 11.8 - 14.4 %    Platelets 467 654 - 728 k/uL    MPV 10.5 8.1 - 13.5 fL    NRBC Automated 0.0 0.0 per 100 WBC    CREATININE 0.57 0.50 - 0.90 mg/dL    GFR Non-African American >60 >60 mL/min    GFR African American >60 >60 mL/min    GFR Comment          GFR Staging NOT REPORTED     Glucose 209 (H) 70 - 99 mg/dL    hCG Qual CANCELLED BY: ER NEGATIVE    Sodium 141 135 - 144 mmol/L    Potassium 4.1 3.7 - 5.3 mmol/L    Chloride 106 98 - 107 mmol/L    CO2 24 20 - 31 mmol/L    Anion Gap 11 9 - 17 mmol/L    Protime 9.9 9.1 - 12.3 sec    INR 0.9     PTT 21.9 20.5 - 30.5 sec    pH, [place] 7.394 7.320 - 7.420    pCO2, Efrain 42.4 39 - 55    pO2, [place]frain 33.5 30 - 50    HCO3, Venous 25.3 24 - 30 mmol/L    Positive Base Excess, [place] 0.8 0.0 - 2.0 mmol/L    Negative Base Excess, Efrain NOT REPORTED 0.0 - 2.0 mmol/L    O2 Sat, [place] 57.9 (L) 60.0 - 85.0 %    Total Hb NOT REPORTED 12.0 - 16.0 g/dl    Oxyhemoglobin NOT REPORTED 95.0 - 98.0 %    Carboxyhemoglobin 0.7 0 - 5 %    Methemoglobin NOT REPORTED 0.0 - 1.5 %    Pt Temp 37.0     pH, Efrain, Temp Adj NOT REPORTED 7.320 - 7.420    pCO2, Efrain, Temp Adj NOT REPORTED 39 - 55 mmHg    pO2, Efrain, Temp Adj NOT REPORTED 30 - 50 mmHg    O2 Device/Flow/% NOT REPORTED     Respiratory Rate NOT REPORTED     Dominic Test NOT REPORTED     Sample Site NOT REPORTED     Pt.  Position NOT REPORTED     Mode NOT REPORTED     Set Rate NOT REPORTED Total Rate NOT REPORTED     VT NOT REPORTED     FIO2 RA     Peep/Cpap NOT REPORTED     PSV NOT REPORTED     Text for Respiratory NOT REPORTED     NOTIFICATION NOT REPORTED     NOTIFICATION TIME NOT REPORTED    TYPE AND SCREEN   Result Value Ref Range    Expiration Date 07/11/2021,2359     Arm Band Number [id number]     ABO/Rh O POSITIVE     Antibody Screen NEGATIVE        IMPRESSION: 72-year-old female with Alzheimer's, found down, found to have subdural hematoma, trauma alert, will obtain trauma scans, admit to the trauma service. RADIOLOGY:  XR SHOULDER RIGHT (MIN 2 VIEWS)    Result Date: 7/8/2021  EXAMINATION: ONE XRAY VIEW OF THE CHEST; THREE XRAY VIEWS OF THE RIGHT SHOULDER 7/8/2021 4:53 pm COMPARISON: Chest of 12 May 2021 HISTORY: ORDERING SYSTEM PROVIDED HISTORY: trauma TECHNOLOGIST PROVIDED HISTORY: trauma Reason for Exam: supine Acuity: Acute Type of Exam: Initial FINDINGS: Chest: The patient has a comminuted fracture of the proximal left humerus involving the greater tuberosity. AP portable view of the chest time stamped at 1649 hours demonstrates overlying cardiac monitoring electrodes. Heart is stable. Aorta is calcified and ectatic. Stable granuloma is present the left lower lung field. No consolidation, vascular congestion, effusion or pneumothorax is noted. Right shoulder: Osteopenia is noted. The humeral head is well circumscribed and situated within the glenoid fossa. Moderate glenohumeral degenerative changes are present. Increased acromiohumeral distance is noted suggesting effusion. No acute fracture or dislocation is noted. AC relationship is preserved. Chest: No acute cardiopulmonary process. However, there is a lucency through the greater tuberosity of the humerus suspicious for nondisplaced fracture. Correlate clinically. Right shoulder: Osteopenia. Advanced arthritic change. No acute fracture or dislocation. Suspected effusion.      XR HIP RIGHT (1 VIEW)    Result Date: 7/8/2021  EXAMINATION: ONE XRAY VIEW OF THE RIGHT HIP 7/8/2021 10:53 am COMPARISON: May 12, 2021 HISTORY: ORDERING SYSTEM PROVIDED HISTORY: trauma TECHNOLOGIST PROVIDED HISTORY: trauma Reason for Exam: fall, difficulty following commands, dementia Acuity: Acute Type of Exam: Initial FINDINGS: Femoral head appears normally articulated at the hip joint on this single AP view. Femoral neck and intertrochanteric region are intact. Pubic rami are intact. Soft tissues appear normal.  Degenerative changes present involving the symphysis pubis. No evidence of an acute fracture or traumatic malalignment involving the right hip. CT HEAD WO CONTRAST    Result Date: 7/8/2021  EXAMINATION: CT OF THE HEAD WITHOUT CONTRAST  7/8/2021 12:59 pm TECHNIQUE: CT of the head was performed without the administration of intravenous contrast. Dose modulation, iterative reconstruction, and/or weight based adjustment of the mA/kV was utilized to reduce the radiation dose to as low as reasonably achievable. COMPARISON: None. HISTORY: ORDERING SYSTEM PROVIDED HISTORY: Syncope, weakness TECHNOLOGIST PROVIDED HISTORY: Syncope, weakness Decision Support Exception - unselect if not a suspected or confirmed emergency medical condition->Emergency Medical Condition (MA) Reason for Exam: Syncope, weakness. Hx of dementia Acuity: Acute Type of Exam: Initial FINDINGS: BRAIN/VENTRICLES: There is no evidence for mass effect or midline shift. There is an acute subdural hematoma in the right frontal region which measures 8 mm in thickness. There is also subdural hematoma just anterior to the right temporal lobe in the middle cranial fossa and laterally. This measures 8 mm in thickness. Laterally, this measures 5 mm in thickness. The previously seen bilateral low-density extra-axial fluid collections have resolved. The gray-white differentiation is maintained without evidence of an acute infarct.  There is prominence of the ventricles and sulci due to global parenchymal volume loss. There are nonspecific areas of hypoattenuation within the periventricular and subcortical white matter, which likely represent chronic microvascular ischemic change. ORBITS: The visualized portion of the orbits demonstrate no acute abnormality. SINUSES: The visualized paranasal sinuses and mastoid air cells demonstrate no acute abnormality. SOFT TISSUES/SKULL: No acute abnormality of the visualized skull or soft tissues. New acute subdural hematomas in the right frontal lobe and anterior to the right temporal lobe. The findings were discussed with Dr. Cary Vieira on 07/08/2021 at 1:27 p.m. [facility] CT CERVICAL SPINE WO CONTRAST    Result Date: 7/8/2021  EXAMINATION: CT OF THE CERVICAL SPINE WITHOUT CONTRAST 7/8/2021 10:54 am TECHNIQUE: CT of the cervical spine was performed without the administration of intravenous contrast. Multiplanar reformatted images are provided for review. Dose modulation, iterative reconstruction, and/or weight based adjustment of the mA/kV was utilized to reduce the radiation dose to as low as reasonably achievable. COMPARISON: May 12, 2021 HISTORY: ORDERING SYSTEM PROVIDED HISTORY: trauma TECHNOLOGIST PROVIDED HISTORY: trauma Decision Support Exception - unselect if not a suspected or confirmed emergency medical condition->Emergency Medical Condition (MA) Reason for Exam: FALL Acuity: Unknown Type of Exam: Unknown FINDINGS: BONES/ALIGNMENT: There is no acute fracture or traumatic malalignment. DEGENERATIVE CHANGES: Multilevel degenerative changes are again visualized, most prominent at the C5-6 disc level. SOFT TISSUES: There is no prevertebral soft tissue swelling. No acute abnormality of the cervical spine.      XR CHEST PORTABLE    Result Date: 7/8/2021  EXAMINATION: ONE XRAY VIEW OF THE CHEST; THREE XRAY VIEWS OF THE RIGHT SHOULDER 7/8/2021 4:53 pm COMPARISON: Chest of 12 May 2021 HISTORY: ORDERING SYSTEM PROVIDED HISTORY: trauma TECHNOLOGIST PROVIDED HISTORY: trauma Reason for Exam: supine Acuity: Acute Type of Exam: Initial FINDINGS: Chest: The patient has a comminuted fracture of the proximal left humerus involving the greater tuberosity. AP portable view of the chest time stamped at 1649 hours demonstrates overlying cardiac monitoring electrodes. Heart is stable. Aorta is calcified and ectatic. Stable granuloma is present the left lower lung field. No consolidation, vascular congestion, effusion or pneumothorax is noted. Right shoulder: Osteopenia is noted. The humeral head is well circumscribed and situated within the glenoid fossa. Moderate glenohumeral degenerative changes are present. Increased acromiohumeral distance is noted suggesting effusion. No acute fracture or dislocation is noted. AC relationship is preserved. Chest: No acute cardiopulmonary process. However, there is a lucency through the greater tuberosity of the humerus suspicious for nondisplaced fracture. Correlate clinically. Right shoulder: Osteopenia. Advanced arthritic change. No acute fracture or dislocation. Suspected effusion. CT CHEST ABDOMEN PELVIS W CONTRAST    Result Date: 7/8/2021  EXAMINATION: CT OF THE CHEST, ABDOMEN, AND PELVIS WITH CONTRAST; CT OF THE THORACIC SPINE WITHOUT CONTRAST; CT OF THE LUMBAR SPINE WITHOUT CONTRAST 7/8/2021 3:55 pm TECHNIQUE: CT of the chest, abdomen and pelvis was performed with the administration of intravenous contrast. Multiplanar reformatted images are provided for review. Dose modulation, iterative reconstruction, and/or weight based adjustment of the mA/kV was utilized to reduce the radiation dose to as low as reasonably achievable.; CT of the thoracic spine was performed without the administration of intravenous contrast. Multiplanar reformatted images are provided for review.  Dose modulation, iterative reconstruction, and/or weight based adjustment of the mA/kV was utilized to reduce the radiation dose to as low as reasonably achievable.; CT of the lumbar spine was performed without the administration of intravenous contrast. Multiplanar reformatted images are provided for review. Dose modulation, iterative reconstruction, and/or weight based adjustment of the mA/kV was utilized to reduce the radiation dose to as low as reasonably achievable. COMPARISON: CT study from May 12, 2021. HISTORY: ORDERING SYSTEM PROVIDED HISTORY: trauma TECHNOLOGIST PROVIDED HISTORY: trauma Decision Support Exception - unselect if not a suspected or confirmed emergency medical condition->Emergency Medical Condition (MA) Reason for Exam: trauma Acuity: Unknown Type of Exam: Unknown; ORDERING SYSTEM PROVIDED HISTORY: trauma TECHNOLOGIST PROVIDED HISTORY: trauma Reason for Exam: trauma Acuity: Unknown Type of Exam: Unknown FINDINGS: Chest: Mediastinum: The heart size is normal. No pericardial effusion. The thoracic aorta and proximal great vessels are patent and normal in caliber. No acute aortic abnormality, intramural hematoma, or mediastinal hematoma. The central pulmonary arteries are patent and free of embolism. No enlarged or suspicious axillary, hilar, or mediastinal lymphadenopathy. Lungs/pleura: The trachea and mainstem bronchi are widely patent. There is mild centrilobular emphysema. The lungs are grossly clear. No discrete noncalcified pulmonary nodule or mass. No pleural effusion or pneumothorax. Soft Tissues/Bones: No chest wall hematoma. No acute fracture. Thoracic Spine: BONES/ALIGNMENT:   Bone mineralization is normal.  There is a healing nondisplaced fracture of the T12 vertebral body superior endplate without retropulsion or loss of height. The vertebral bodies and posterior elements appear intact and appropriately aligned without acute fracture or subluxation. Vertebral body stature is maintained throughout as is the normal thoracic kyphosis.  DEGENERATIVE CHANGES: There is mild multilevel thoracic degenerative disc disease. No significant bony neural foraminal narrowing. SOFT TISSUES: No paraspinal soft tissue abnormality. Abdomen/Pelvis: Organs: The liver, spleen, pancreas, kidneys, gallbladder, and adrenal glands appear normal. GI/Bowel: The stomach and the small and large bowel loops are normal in caliber, contour, and morphology, without acute or significant abnormality. No dilated loops or areas of bowel wall thickening. Peritoneum/Retroperitoneum: There is no free fluid or extraluminal gas. No mesenteric or retroperitoneal hematoma. No enlarged or suspicious mesenteric or retroperitoneal lymphadenopathy. The abdominal aorta and iliac arteries are patent and of normal caliber. Pelvis: No pelvic free fluid or enlarged or suspicious pelvic or inguinal lymphadenopathy. No appreciable uterine or adnexal abnormality. The urinary bladder is relatively decompressed with a Patton catheter in place. The pelvic bowel loops are unremarkable. Bones/Soft Tissues: No body wall hematoma. Healed nondisplaced subacute fractures of the left pubic rami and of the left sacral ala. No acute fracture. Lumbar Spine: BONES/ALIGNMENT:   Bone mineralization is normal.  The vertebral bodies and posterior elements appear intact and appropriately aligned without acute fracture or subluxation. Vertebral body stature is maintained throughout as is the normal lumbar lordosis. DEGENERATIVE CHANGES: There is mild multilevel lumbar degenerative disc disease and facet hypertrophic change. A moderate degree of spinal stenosis and left-sided bony neural foraminal narrowing is present at L5-S1. No additional significant bony neural foraminal narrowing. SOFT TISSUES: No paraspinal soft tissue abnormality. There are no acute traumatic findings within of the chest, abdomen, or pelvis. Healing nondisplaced subacute fractures of the T12 superior endplate, of the left pubic rami, and of the left sacral ala noted.   Degenerative changes of the thoracic and lumbar spine. No acute fracture. CT LUMBAR SPINE TRAUMA RECONSTRUCTION    Result Date: 7/8/2021  EXAMINATION: CT OF THE CHEST, ABDOMEN, AND PELVIS WITH CONTRAST; CT OF THE THORACIC SPINE WITHOUT CONTRAST; CT OF THE LUMBAR SPINE WITHOUT CONTRAST 7/8/2021 3:55 pm TECHNIQUE: CT of the chest, abdomen and pelvis was performed with the administration of intravenous contrast. Multiplanar reformatted images are provided for review. Dose modulation, iterative reconstruction, and/or weight based adjustment of the mA/kV was utilized to reduce the radiation dose to as low as reasonably achievable.; CT of the thoracic spine was performed without the administration of intravenous contrast. Multiplanar reformatted images are provided for review. Dose modulation, iterative reconstruction, and/or weight based adjustment of the mA/kV was utilized to reduce the radiation dose to as low as reasonably achievable.; CT of the lumbar spine was performed without the administration of intravenous contrast. Multiplanar reformatted images are provided for review. Dose modulation, iterative reconstruction, and/or weight based adjustment of the mA/kV was utilized to reduce the radiation dose to as low as reasonably achievable. COMPARISON: CT study from May 12, 2021. HISTORY: ORDERING SYSTEM PROVIDED HISTORY: trauma TECHNOLOGIST PROVIDED HISTORY: trauma Decision Support Exception - unselect if not a suspected or confirmed emergency medical condition->Emergency Medical Condition (MA) Reason for Exam: trauma Acuity: Unknown Type of Exam: Unknown; ORDERING SYSTEM PROVIDED HISTORY: trauma TECHNOLOGIST PROVIDED HISTORY: trauma Reason for Exam: trauma Acuity: Unknown Type of Exam: Unknown FINDINGS: Chest: Mediastinum: The heart size is normal. No pericardial effusion. The thoracic aorta and proximal great vessels are patent and normal in caliber. No acute aortic abnormality, intramural hematoma, or mediastinal hematoma.  The central pulmonary arteries are patent and free of embolism. No enlarged or suspicious axillary, hilar, or mediastinal lymphadenopathy. Lungs/pleura: The trachea and mainstem bronchi are widely patent. There is mild centrilobular emphysema. The lungs are grossly clear. No discrete noncalcified pulmonary nodule or mass. No pleural effusion or pneumothorax. Soft Tissues/Bones: No chest wall hematoma. No acute fracture. Thoracic Spine: BONES/ALIGNMENT:   Bone mineralization is normal.  There is a healing nondisplaced fracture of the T12 vertebral body superior endplate without retropulsion or loss of height. The vertebral bodies and posterior elements appear intact and appropriately aligned without acute fracture or subluxation. Vertebral body stature is maintained throughout as is the normal thoracic kyphosis. DEGENERATIVE CHANGES: There is mild multilevel thoracic degenerative disc disease. No significant bony neural foraminal narrowing. SOFT TISSUES: No paraspinal soft tissue abnormality. Abdomen/Pelvis: Organs: The liver, spleen, pancreas, kidneys, gallbladder, and adrenal glands appear normal. GI/Bowel: The stomach and the small and large bowel loops are normal in caliber, contour, and morphology, without acute or significant abnormality. No dilated loops or areas of bowel wall thickening. Peritoneum/Retroperitoneum: There is no free fluid or extraluminal gas. No mesenteric or retroperitoneal hematoma. No enlarged or suspicious mesenteric or retroperitoneal lymphadenopathy. The abdominal aorta and iliac arteries are patent and of normal caliber. Pelvis: No pelvic free fluid or enlarged or suspicious pelvic or inguinal lymphadenopathy. No appreciable uterine or adnexal abnormality. The urinary bladder is relatively decompressed with a Patton catheter in place. The pelvic bowel loops are unremarkable. Bones/Soft Tissues: No body wall hematoma.   Healed nondisplaced subacute fractures of the left pubic rami and of the left sacral ala. No acute fracture. Lumbar Spine: BONES/ALIGNMENT:   Bone mineralization is normal.  The vertebral bodies and posterior elements appear intact and appropriately aligned without acute fracture or subluxation. Vertebral body stature is maintained throughout as is the normal lumbar lordosis. DEGENERATIVE CHANGES: There is mild multilevel lumbar degenerative disc disease and facet hypertrophic change. A moderate degree of spinal stenosis and left-sided bony neural foraminal narrowing is present at L5-S1. No additional significant bony neural foraminal narrowing. SOFT TISSUES: No paraspinal soft tissue abnormality. There are no acute traumatic findings within of the chest, abdomen, or pelvis. Healing nondisplaced subacute fractures of the T12 superior endplate, of the left pubic rami, and of the left sacral ala noted. Degenerative changes of the thoracic and lumbar spine. No acute fracture. CT THORACIC SPINE TRAUMA RECONSTRUCTION    Result Date: 7/8/2021  EXAMINATION: CT OF THE CHEST, ABDOMEN, AND PELVIS WITH CONTRAST; CT OF THE THORACIC SPINE WITHOUT CONTRAST; CT OF THE LUMBAR SPINE WITHOUT CONTRAST 7/8/2021 3:55 pm TECHNIQUE: CT of the chest, abdomen and pelvis was performed with the administration of intravenous contrast. Multiplanar reformatted images are provided for review. Dose modulation, iterative reconstruction, and/or weight based adjustment of the mA/kV was utilized to reduce the radiation dose to as low as reasonably achievable.; CT of the thoracic spine was performed without the administration of intravenous contrast. Multiplanar reformatted images are provided for review.  Dose modulation, iterative reconstruction, and/or weight based adjustment of the mA/kV was utilized to reduce the radiation dose to as low as reasonably achievable.; CT of the lumbar spine was performed without the administration of intravenous contrast. Multiplanar reformatted images are provided for review. Dose modulation, iterative reconstruction, and/or weight based adjustment of the mA/kV was utilized to reduce the radiation dose to as low as reasonably achievable. COMPARISON: CT study from May 12, 2021. HISTORY: ORDERING SYSTEM PROVIDED HISTORY: trauma TECHNOLOGIST PROVIDED HISTORY: trauma Decision Support Exception - unselect if not a suspected or confirmed emergency medical condition->Emergency Medical Condition (MA) Reason for Exam: trauma Acuity: Unknown Type of Exam: Unknown; ORDERING SYSTEM PROVIDED HISTORY: trauma TECHNOLOGIST PROVIDED HISTORY: trauma Reason for Exam: trauma Acuity: Unknown Type of Exam: Unknown FINDINGS: Chest: Mediastinum: The heart size is normal. No pericardial effusion. The thoracic aorta and proximal great vessels are patent and normal in caliber. No acute aortic abnormality, intramural hematoma, or mediastinal hematoma. The central pulmonary arteries are patent and free of embolism. No enlarged or suspicious axillary, hilar, or mediastinal lymphadenopathy. Lungs/pleura: The trachea and mainstem bronchi are widely patent. There is mild centrilobular emphysema. The lungs are grossly clear. No discrete noncalcified pulmonary nodule or mass. No pleural effusion or pneumothorax. Soft Tissues/Bones: No chest wall hematoma. No acute fracture. Thoracic Spine: BONES/ALIGNMENT:   Bone mineralization is normal.  There is a healing nondisplaced fracture of the T12 vertebral body superior endplate without retropulsion or loss of height. The vertebral bodies and posterior elements appear intact and appropriately aligned without acute fracture or subluxation. Vertebral body stature is maintained throughout as is the normal thoracic kyphosis. DEGENERATIVE CHANGES: There is mild multilevel thoracic degenerative disc disease. No significant bony neural foraminal narrowing. SOFT TISSUES: No paraspinal soft tissue abnormality. Abdomen/Pelvis: Organs:  The liver, spleen, pancreas, kidneys, gallbladder, and adrenal glands appear normal. GI/Bowel: The stomach and the small and large bowel loops are normal in caliber, contour, and morphology, without acute or significant abnormality. No dilated loops or areas of bowel wall thickening. Peritoneum/Retroperitoneum: There is no free fluid or extraluminal gas. No mesenteric or retroperitoneal hematoma. No enlarged or suspicious mesenteric or retroperitoneal lymphadenopathy. The abdominal aorta and iliac arteries are patent and of normal caliber. Pelvis: No pelvic free fluid or enlarged or suspicious pelvic or inguinal lymphadenopathy. No appreciable uterine or adnexal abnormality. The urinary bladder is relatively decompressed with a Patton catheter in place. The pelvic bowel loops are unremarkable. Bones/Soft Tissues: No body wall hematoma. Healed nondisplaced subacute fractures of the left pubic rami and of the left sacral ala. No acute fracture. Lumbar Spine: BONES/ALIGNMENT:   Bone mineralization is normal.  The vertebral bodies and posterior elements appear intact and appropriately aligned without acute fracture or subluxation. Vertebral body stature is maintained throughout as is the normal lumbar lordosis. DEGENERATIVE CHANGES: There is mild multilevel lumbar degenerative disc disease and facet hypertrophic change. A moderate degree of spinal stenosis and left-sided bony neural foraminal narrowing is present at L5-S1. No additional significant bony neural foraminal narrowing. SOFT TISSUES: No paraspinal soft tissue abnormality. There are no acute traumatic findings within of the chest, abdomen, or pelvis. Healing nondisplaced subacute fractures of the T12 superior endplate, of the left pubic rami, and of the left sacral ala noted. Degenerative changes of the thoracic and lumbar spine. No acute fracture.        EKG      All EKG's are interpreted by the Emergency Department Physician who either signs or Co-signs this chart in the absence of a cardiologist.    EMERGENCY DEPARTMENT COURSE:  Patient came to emergency department, HPI and physical exam were conducted. All nursing notes were reviewed. Patient was admitted to the trauma service. PROCEDURES:      CONSULTS:  IP CONSULT TO NEUROSURGERY    CRITICAL CARE:      FINAL IMPRESSION      1. Subdural hematoma ([facility] [address]          DISPOSITION / PLAN     DISPOSITION Admitted 07/08/2021 05:30:11 PM      PATIENT REFERRED TO:  No follow-up provider specified.     DISCHARGE MEDICATIONS:  New Prescriptions    No medications on file       Shyla Galan MD  Emergency Medicine Resident    (Please note that portions of thisnote were completed with a voice recognition program.  Efforts were made to edit the dictations but occasionally words are mis-transcribed.)        Shyla Galan MD  Resident  07/08/21 2005

## 2021-07-08 NOTE — ED PROVIDER NOTES
[address] [place] [address] ED  eMERGENCY dEPARTMENT eNCOUnter      Pt Name: Carole Hdz  MRN: [id number]  [place] 1940  Date of evaluation: 7/8/2021  Provider: Marli Rodriguez NP, PEYMAN Mcguire [id number]       Chief Complaint   Patient presents with    Fall         HISTORY OF PRESENT ILLNESS  (Location/Symptom, Timing/Onset, Context/Setting, Quality, Duration, Modifying Factors, Severity.)   Carole Hdz is a 68 y.o. female who presents to the emergency department today by EMS for evaluation of a fall. Patient has a history of dementia. She lives at home with her son. Her son states that to check in on 8 AM this morning and she was lying in bed. When he went back at 1030 to wake her up because her [employer] Whe[employer] had came she was laying on the floor not in her bed. The son states that she has been slightly more lethargic and more confused than her baseline. The patient does not complain of any pain at this time. The son reports that she did fall about a month to a month and half ago and had a brain bleed at that time. Nursing Notes were reviewed.     ALLERGIES     Penicillins and Tramadol    CURRENT MEDICATIONS       Previous Medications    ATORVASTATIN (LIPITOR) 20 MG TABLET    Take 20 mg by mouth daily    DIPYRIDAMOLE (PERSANTINE) 25 MG TABLET    Take 25 mg by mouth 3 times daily     DONEPEZIL (ARICEPT) 10 MG TABLET    Take 10 mg by mouth nightly    GLIPIZIDE (GLUCOTROL) 5 MG TABLET    Take 5-10 mg by mouth See Admin Instructions Indications: Takes 10mg AM and 5mg PM     LISINOPRIL (PRINIVIL;ZESTRIL) 20 MG TABLET    Take 20 mg by mouth daily    METFORMIN (GLUCOPHAGE) 1000 MG TABLET    Take 1,000 mg by mouth 2 times daily (with meals)    POTASSIUM CHLORIDE (KLOR-CON M) 20 MEQ EXTENDED RELEASE TABLET    Take 1 tablet by mouth daily (with breakfast)    SITAGLIPTIN (JANUVIA) 25 MG TABLET    Take 25 mg by mouth daily    VITAMIN D (ERGOCALCIFEROL) 1.25 MG (83006 UT) CAPS CAPSULE    Take 1 capsule by mouth once a week       PAST MEDICAL HISTORY         Diagnosis Date    Dementia ([facility] [address]     Diabetes mellitus ([facility] [address]     Fracture     proximal lt humerus. no surgery. 10/2018    Hyperlipidemia     Hypertension        SURGICAL HISTORY     History reviewed. No pertinent surgical history. FAMILY HISTORY           Family history unknown: Yes     No family status information on file. SOCIAL HISTORY      reports that she has quit smoking. She has never used smokeless tobacco. She reports that she does not drink alcohol and does not use drugs. REVIEW OF SYSTEMS    (2-9 systems for level 4, 10 or more for level 5)     Review of Systems   Constitutional: Negative for chills, fever and unexpected weight change. HENT: Negative for congestion, rhinorrhea, sinus pressure and sore throat. Respiratory: Negative for cough, shortness of breath and wheezing. Cardiovascular: Negative for chest pain and palpitations. Gastrointestinal: Negative for abdominal pain, constipation, diarrhea, nausea and vomiting. Genitourinary: Negative for dysuria and hematuria. Musculoskeletal: Negative for arthralgias and myalgias. Skin: Negative for color change and rash. Neurological: Negative for dizziness, weakness and headaches. Hematological: Negative for adenopathy. All other systems reviewed and are negative. Except as noted above the remainder of the review of systems was reviewed and negative. PHYSICAL EXAM    (up to 7 for level 4, 8 or more for level 5)     ED Triage Vitals [07/08/21 1208]   BP Temp Temp Source Pulse Resp SpO2 Height Weight   (!) 113/91 98.6 °F (37 °C) Oral 108 20 97 % -- --       Physical Exam  Vitals reviewed. Constitutional:       Appearance: She is well-developed. HENT:      Head: Normocephalic and atraumatic. Eyes:      Conjunctiva/sclera: Conjunctivae normal.      Pupils: Pupils are equal, round, and reactive to light.    Cardiovascular:      Rate and Rhythm: Normal rate and regular rhythm. Pulmonary:      Effort: Pulmonary effort is normal. No respiratory distress. Breath sounds: Normal breath sounds. No stridor. Abdominal:      General: Bowel sounds are normal.      Palpations: Abdomen is soft. Musculoskeletal:         General: Normal range of motion. Cervical back: Normal range of motion and neck supple. Lymphadenopathy:      Cervical: No cervical adenopathy. Skin:     General: Skin is warm and dry. Findings: No rash. Neurological:      Mental Status: She is lethargic. DIAGNOSTIC RESULTS       RADIOLOGY:   Non-plain film images such as CT, Ultrasound and MRI are read by the radiologist. Andrea Davenport radiographic images are visualized and preliminarily interpreted by the emergency physician with the below findings:    CT HEAD WO CONTRAST    Result Date: 7/8/2021  EXAMINATION: CT OF THE HEAD WITHOUT CONTRAST  7/8/2021 12:59 pm TECHNIQUE: CT of the head was performed without the administration of intravenous contrast. Dose modulation, iterative reconstruction, and/or weight based adjustment of the mA/kV was utilized to reduce the radiation dose to as low as reasonably achievable. COMPARISON: None. HISTORY: ORDERING SYSTEM PROVIDED HISTORY: Syncope, weakness TECHNOLOGIST PROVIDED HISTORY: Syncope, weakness Decision Support Exception - unselect if not a suspected or confirmed emergency medical condition->Emergency Medical Condition (MA) Reason for Exam: Syncope, weakness. Hx of dementia Acuity: Acute Type of Exam: Initial FINDINGS: BRAIN/VENTRICLES: There is no evidence for mass effect or midline shift. There is an acute subdural hematoma in the right frontal region which measures 8 mm in thickness. There is also subdural hematoma just anterior to the right temporal lobe in the middle cranial fossa and laterally. This measures 8 mm in thickness. Laterally, this measures 5 mm in thickness.   The previously seen bilateral low-density extra-axial fluid collections have resolved. The gray-white differentiation is maintained without evidence of an acute infarct. There is prominence of the ventricles and sulci due to global parenchymal volume loss. There are nonspecific areas of hypoattenuation within the periventricular and subcortical white matter, which likely represent chronic microvascular ischemic change. ORBITS: The visualized portion of the orbits demonstrate no acute abnormality. SINUSES: The visualized paranasal sinuses and mastoid air cells demonstrate no acute abnormality. SOFT TISSUES/SKULL: No acute abnormality of the visualized skull or soft tissues. New acute subdural hematomas in the right frontal lobe and anterior to the right temporal lobe. The findings were discussed with Dr. Herminia Michele on 07/08/2021 at 1:27 p.m..     Interpretation per the Radiologist below, if available at the time of this note:    [address]   Final Result   New acute subdural hematomas in the right frontal lobe and anterior to the   right temporal lobe. The findings were discussed with Dr. Herminia Michele on 07/08/2021 at 1:27 p.m. Crystal Smiley                  LABS:  Labs Reviewed   CBC WITH AUTO DIFFERENTIAL - Abnormal; Notable for the following components:       Result Value    Seg Neutrophils 88 (*)     Lymphocytes 7 (*)     Eosinophils % 0 (*)     Absolute Lymph # 0.54 (*)     All other components within normal limits   BASIC METABOLIC PANEL - Abnormal; Notable for the following components:    Glucose 326 (*)     Bun/Cre Ratio 24 (*)     All other components within normal limits   URINE RT REFLEX TO CULTURE - Abnormal; Notable for the following components:    Turbidity UA SLIGHTLY CLOUDY (*)     Glucose, Ur 2+ (*)     Ketones, Urine TRACE (*)     Specific Gravity, UA 1.039 (*)     Urine Hgb TRACE (*)     Protein, UA TRACE (*)     Leukocyte Esterase, Urine SMALL (*)     All other components within normal limits   MICROSCOPIC URINALYSIS - Abnormal; Notable for the following components:    Bacteria, UA MODERATE (*)     All other components within normal limits   CULTURE, URINE       All other labs were within normal range or not returned as of this dictation. EMERGENCY DEPARTMENT COURSE and DIFFERENTIAL DIAGNOSIS/MDM:   Vitals:    Vitals:    07/08/21 1208 07/08/21 1215 07/08/21 1230 07/08/21 1530   BP: (!) 113/91 115/78 116/76 (!) 141/68   Pulse: 108 109 108 81   Resp: 20 18 20 11   Temp: 98.6 °F (37 °C)      TempSrc: Oral      SpO2: 97% 97% 96% 97%       Medical Decision Making: Patient is found to have a subdural hematoma appears to be acute and does not appear to be from a month and a half ago. The patient was also found to have a urinary tract infection and will be placed on IV Rocephin. Case was discussed with Dr Waleska Cardenas at [facility] who accepts the patient. Medications   0.9 % sodium chloride bolus (0 mLs Intravenous Stopped 7/8/21 1509)   cefTRIAXone (ROCEPHIN) 1000 mg IVPB in 50 mL D5W minibag (0 mg Intravenous Stopped 7/8/21 1509)       FINAL IMPRESSION      1. Subdural hematoma ([address]    2. Urinary tract infection without hematuria, site unspecified          DISPOSITION/PLAN   DISPOSITION Decision To Transfer 07/08/2021 01:41:18 PM      PATIENT REFERRED TO:   No follow-up provider specified.     DISCHARGE MEDICATIONS:     New Prescriptions    No medications on file           (Please note that portions of this note were completed with a voice recognition program.  Efforts were made to edit the dictations but occasionally words are mis-transcribed.)    [facility] NP, APRN - CNP  Certified Nurse Practitioner          PEYMAN Mar CNP  07/08/21 4263

## 2021-07-08 NOTE — ED PROVIDER NOTES
The patient was seen and examined by me in conjunction with the mid-level provider. I agree with his/her assessment and treatment plan. Right-sided subdural hematoma is identified without mass-effect.      Tor Polk MD  07/08/21 9751

## 2021-07-08 NOTE — ED NOTES
Report called to charge WILLY Martinez) at [facility]      Callum Treviño RN  07/08/21 9424

## 2021-07-08 NOTE — ED PROVIDER NOTES
[facility] ED  eMERGENCY dEPARTMENT eNCOUnter   Attending Attestation     Pt Name: Grace Weber  MRN: [id number]  [place] 1940  Date of evaluation: 7/8/21       Grace Weber is a 68 y.o. female who presents with Fall      History: Pt with a fall. Uncertain last known well. Seen at [facility]. Pt with SDH. Pt arrives awake and alert for trauma/NS evaluation. Exam: HRRR, lungs CTABL, abdomen soft and non tender. Pt awake and alert. Plan for admission. Trauma priority consulted and admitted to trauma. NS consult. I performed a history and physical examination of the patient and discussed management with the resident. I reviewed the residents note and agree with the documented findings and plan of care. Any areas of disagreement are noted on the chart. I was personally present for the key portions of any procedures. I have documented in the chart those procedures where I was not present during the key portions. I have personally reviewed all images and agree with the resident's interpretation. I have reviewed the emergency nurses triage note. I agree with the chief complaint, past medical history, past surgical history, allergies, medications, social and family history as documented unless otherwise noted below. Documentation of the HPI, Physical Exam and Medical Decision Making performed by medical students or scribes is based on my personal performance of the HPI, PE and MDM. For Phys Assistant/ Nurse Practitioner cases/documentation I have had a face to face evaluation of this patient and have completed at least one if not all key elements of the E/M (history, physical exam, and MDM). Additional findings are as noted. For APC cases I have personally evaluated and examined the patient in conjunction with the APC and agree with the treatment plan and disposition of the patient as recorded by the APC.     Elie Humphreys MD  Attending Emergency  Physician       Theo Mathias MD  07/08/21 [address]

## 2021-07-08 NOTE — ED PROVIDER NOTES
Acute Type of Exam: Initial FINDINGS: BRAIN/VENTRICLES: There is no evidence for mass effect or midline shift. There is an acute subdural hematoma in the right frontal region which measures 8 mm in thickness. There is also subdural hematoma just anterior to the right temporal lobe in the middle cranial fossa and laterally. This measures 8 mm in thickness. Laterally, this measures 5 mm in thickness. The previously seen bilateral low-density extra-axial fluid collections have resolved. The gray-white differentiation is maintained without evidence of an acute infarct. There is prominence of the ventricles and sulci due to global parenchymal volume loss. There are nonspecific areas of hypoattenuation within the periventricular and subcortical white matter, which likely represent chronic microvascular ischemic change. ORBITS: The visualized portion of the orbits demonstrate no acute abnormality. SINUSES: The visualized paranasal sinuses and mastoid air cells demonstrate no acute abnormality. SOFT TISSUES/SKULL: No acute abnormality of the visualized skull or soft tissues. New acute subdural hematomas in the right frontal lobe and anterior to the right temporal lobe. The findings were discussed with Dr. Warren Lopez on 07/08/2021 at 1:27 p.m. Neeta Mann RECENT VITALS:      ,   ,  ,  ,     Please see trauma charting for complete set of vitals    Cain Babin is a 68 y.o. female who presents with complaint of fall. Transfer from [facility]. Known subdural.  Trauma priority. Neurosurgery consult. Admit. OUTSTANDING TASKS / RECOMMENDATIONS:    1.  Disposition made by previous attending and nothing to do      Karthikeyan Celis MD, Diandra Nelson  Attending Emergency Physician  [facility] ED        Cristiano Sanders MD  07/08/21 6697

## 2021-07-08 NOTE — CONSULTS
Department of Neurosurgery                                            Nurse Practitioner Consult Note      Reason for Consult:  SDH  Requesting Physician:  Amedeo Seip, CNP  Neurosurgeon:   [] Dr. Roby Gregory  [] Dr. Nell Burt  [] Dr. Jagruti Rooney  [x] Dr. Kelly Knowles      History Obtained From:  patient, family member - son, [facility], electronic medical record    CHIEF COMPLAINT:         Chief Complaint   Patient presents with    Fall       HISTORY OF PRESENT ILLNESS:       The patient is a 68 y.o. female who presents from [facility] after CT head showed right SDH. Patient has dementia at baseline. Called son, [facility], to obtain history and get baseline mental status. Son states when he woke up this morning he found patient on the floor next to her bed and was incontinent of stool. Per son he thinks patient tried to get up out of bed last night and fell. States patient was a little out of it this morning. Baseline patient does ambulate but is confused and does answer questions with yes or okay and does not always follow commands. Patient does take aspirin 325 mg daily for history of stroke in 1990s. PAST MEDICAL HISTORY :       Past Medical History:        Diagnosis Date    Dementia [facility] [address]     Diabetes mellitus ([facility] [address]     Fracture     proximal lt humerus. no surgery. 10/2018    Hyperlipidemia     Hypertension        Past Surgical History:    No past surgical history on file. Social History:   Social History     Socioeconomic History    Marital status:       Spouse name: Not on file    Number of children: Not on file    Years of education: Not on file    Highest education level: Not on file   Occupational History    Not on file   Tobacco Use    Smoking status: Former Smoker    Smokeless tobacco: Never Used    Tobacco comment: quit many years ago, cannot remember date   Substance and Sexual Activity    Alcohol use: No    Drug use: No    Sexual activity: Not on file   Other Topics Concern    Not on file   Social History Narrative    Not on file     Social Determinants of Health     Financial Resource Strain:     Difficulty of Paying Living Expenses:    Food Insecurity:     Worried About Running Out of Food in the Last Year:     [address] in the Last Year:    Transportation Needs:     Lack of Transportation (Medical):  Lack of Transportation (Non-Medical):    Physical Activity:     Days of Exercise per Week:     Minutes of Exercise per Session:    Stress:     Feeling of Stress :    Social Connections:     Frequency of Communication with Friends and Family:     Frequency of Social Gatherings with Friends and Family:     Attends Hindu Services:     Active Member of Clubs or Organizations:     Attends Club or Organization Meetings:     Marital Status:    Intimate Partner Violence:     Fear of Current or Ex-Partner:     Emotionally Abused:     Physically Abused:     Sexually Abused:        Family History:       Family history unknown: Yes       Allergies:  Penicillins and Tramadol    Home Medications:  Prior to Admission medications    Medication Sig Start Date End Date Taking?  Authorizing Provider   vitamin D (ERGOCALCIFEROL) 1.25 MG (59953 UT) CAPS capsule Take 1 capsule by mouth once a week 11/30/20   PEYMAN Leong NP   dipyridamole (PERSANTINE) 25 MG tablet Take 25 mg by mouth 3 times daily     Historical Provider, MD   SITagliptin (JANUVIA) 25 MG tablet Take 25 mg by mouth daily    Historical Provider, MD   potassium chloride (KLOR-CON M) 20 MEQ extended release tablet Take 1 tablet by mouth daily (with breakfast) 5/30/17   Lul Avalos MD   atorvastatin (LIPITOR) 20 MG tablet Take 20 mg by mouth daily    Historical Provider, MD   donepezil (ARICEPT) 10 MG tablet Take 10 mg by mouth nightly    Historical Provider, MD   glipiZIDE (GLUCOTROL) 5 MG tablet Take 5-10 mg by mouth See Admin Instructions Indications: Takes 10mg AM and 5mg PM     Historical Provider, MD   lisinopril (PRINIVIL;ZESTRIL) 20 MG tablet Take 20 mg by mouth daily    Historical Provider, MD   metFORMIN (GLUCOPHAGE) 1000 MG tablet Take 1,000 mg by mouth 2 times daily (with meals)    Historical Provider, MD       Current Medications:   No current facility-administered medications for this encounter. REVIEW OF SYSTEMS:       Difficult to obtain due to dementia. Review of systems otherwise negative. PHYSICAL EXAM:       There were no vitals taken for this visit.       CONSTITUTIONAL: no apparent distress, appears stated age   HEAD: normocephalic, atraumatic   NECK: supple, symmetric, no midline tenderness to palpation   BACK: without midline tenderness, step-offs or deformities   NEUROLOGIC:  EYE OPENING     Spontaneous - 4 [x]       To voice - 3 []       To pain - 2 []       None - 1 []    VERBAL RESPONSE     Appropriate, oriented - 5 []       Dazed or confused - 4 [x]       Syllables, expletives - 3 []       Grunts - 2 []       None - 1 []    MOTOR RESPONSE     Spontaneous, command - 6 [x]       Localizes pain - 5 []       Withdraws pain - 4 []       Abnormal flexion - 3 []       Abnormal extension - 2 []       None - 1 []            Total GCS: 14    Mental Status:  Alert, oriented to self-states name Jennifer Gravely, follows most commands with prompting and encouragement               Cranial Nerves:    III: Pupils:  equal, round, reactive to light  III,IV,VI: Extra Ocular Movements: intact  V: Facial sensation:  intact  VII: Facial strength: intact  VIII: Hearing:  intact  IX: Palate:  intact  XI: Shoulder shrug:  intact  XII: Tongue movement:  normal    Motor Exam:    Drift:  absent  Tone:  normal    Motor exam is symmetrical 5 out of 5 all extremities bilaterally    Sensory:    Right Upper Extremity:  normal  Left Upper Extremity:  normal  Right Lower Extremity:  normal  Left Lower Extremity:  normal    Deep Tendon Reflexes:    Right Bicep:  2+  Left Bicep:  2+  Right Knee:  2+  Left Knee:  2+       LABS AND IMAGING:     CBC with Differential:    Lab Results   Component Value Date    WBC 7.7 07/08/2021    RBC 4.88 07/08/2021    HGB 13.7 07/08/2021    HCT 43.9 07/08/2021     07/08/2021    MCV 90.0 07/08/2021    MCH 28.1 07/08/2021    MCHC 31.2 07/08/2021    RDW 13.3 07/08/2021    LYMPHOPCT 7 07/08/2021    MONOPCT 4 07/08/2021    BASOPCT 1 07/08/2021    MONOSABS 0.31 07/08/2021    LYMPHSABS 0.54 07/08/2021    EOSABS 0.00 07/08/2021    BASOSABS 0.08 07/08/2021    DIFFTYPE NOT REPORTED 07/08/2021     BMP:    Lab Results   Component Value Date     07/08/2021    K 4.1 07/08/2021     07/08/2021    CO2 24 07/08/2021    BUN 18 07/08/2021    LABALBU 3.6 05/12/2021    CREATININE 0.57 07/08/2021    CALCIUM 9.2 07/08/2021    GFRAA >60 07/08/2021    LABGLOM >60 07/08/2021    GLUCOSE 209 07/08/2021       Radiology Review:    CT HEAD WO CONTRAST    Result Date: 7/8/2021  EXAMINATION: CT OF THE HEAD WITHOUT CONTRAST  7/8/2021 12:59 pm TECHNIQUE: CT of the head was performed without the administration of intravenous contrast. Dose modulation, iterative reconstruction, and/or weight based adjustment of the mA/kV was utilized to reduce the radiation dose to as low as reasonably achievable. COMPARISON: None. HISTORY: ORDERING SYSTEM PROVIDED HISTORY: Syncope, weakness TECHNOLOGIST PROVIDED HISTORY: Syncope, weakness Decision Support Exception - unselect if not a suspected or confirmed emergency medical condition->Emergency Medical Condition (MA) Reason for Exam: Syncope, weakness. Hx of dementia Acuity: Acute Type of Exam: Initial FINDINGS: BRAIN/VENTRICLES: There is no evidence for mass effect or midline shift. There is an acute subdural hematoma in the right frontal region which measures 8 mm in thickness. There is also subdural hematoma just anterior to the right temporal lobe in the middle cranial fossa and laterally. This measures 8 mm in thickness.   Laterally, this measures 5 mm in thickness. The previously seen bilateral low-density extra-axial fluid collections have resolved. The gray-white differentiation is maintained without evidence of an acute infarct. There is prominence of the ventricles and sulci due to global parenchymal volume loss. There are nonspecific areas of hypoattenuation within the periventricular and subcortical white matter, which likely represent chronic microvascular ischemic change. ORBITS: The visualized portion of the orbits demonstrate no acute abnormality. SINUSES: The visualized paranasal sinuses and mastoid air cells demonstrate no acute abnormality. SOFT TISSUES/SKULL: No acute abnormality of the visualized skull or soft tissues. New acute subdural hematomas in the right frontal lobe and anterior to the right temporal lobe. The findings were discussed with Dr. Yuniel Barreto on 07/08/2021 at 1:27 p.m. [facility] [place] CT CERVICAL SPINE WO CONTRAST    Result Date: 7/8/2021  EXAMINATION: CT OF THE CERVICAL SPINE WITHOUT CONTRAST 7/8/2021 10:54 am TECHNIQUE: CT of the cervical spine was performed without the administration of intravenous contrast. Multiplanar reformatted images are provided for review. Dose modulation, iterative reconstruction, and/or weight based adjustment of the mA/kV was utilized to reduce the radiation dose to as low as reasonably achievable. COMPARISON: May 12, 2021 HISTORY: ORDERING SYSTEM PROVIDED HISTORY: trauma TECHNOLOGIST PROVIDED HISTORY: trauma Decision Support Exception - unselect if not a suspected or confirmed emergency medical condition->Emergency Medical Condition (MA) Reason for Exam: FALL Acuity: Unknown Type of Exam: Unknown FINDINGS: BONES/ALIGNMENT: There is no acute fracture or traumatic malalignment. DEGENERATIVE CHANGES: Multilevel degenerative changes are again visualized, most prominent at the C5-6 disc level. SOFT TISSUES: There is no prevertebral soft tissue swelling. No acute abnormality of the cervical spine. ASSESSMENT AND PLAN:       Patient Active Problem List   Diagnosis    [facility]'s [facility] [address]    Hypokalemia    Hypoglycemia    Community acquired pneumonia of left lower lobe of lung    Sepsis due to pneumonia ([facility] [address]    Hypertension    Hyperlipidemia    Diabetes mellitus ([facility] [address]    Hyperglycemia    Hypercalcemia    Pneumonia due to organism    Fall from standing    Closed fracture of superior pubic ramus ([facility] [address]    Head injury    SDH (subdural hematoma) (HCC)         A/P:  This is a 68 y.o. female with right sided frontal and temporal subdural hematoma after fall out of bed. Patient care will be discussed with attending, will reevaluated patient along with attending.      - No neurosurgical interventions planned for now  - CTLS recommendations: maintain cervical spine precautions, TLS clear pending CT lumbar and thoracic findings   - Obtain CT head in 6 hours at 8:00 pm   - Neuro checks per protocol  - Hold all antiplatelets and anticoagulants      Additional recommendations may follow    Please contact neurosurgery with any changes in patients neurologic status. Thank you for your consult.        PEYMAN Bhatt - CNP   NS pager [phone]  7/8/2021  5:49 PM

## 2021-07-08 NOTE — ED NOTES
Bed: 22  Expected date: 7/8/21  Expected time: 11:53 AM  Means of arrival:   Comments:  Aparna Li RN  07/08/21 1500

## 2021-07-09 LAB
ABSOLUTE EOS #: 0.16 K/UL (ref 0–0.44)
ABSOLUTE IMMATURE GRANULOCYTE: <0.03 K/UL (ref 0–0.3)
ABSOLUTE LYMPH #: 1.48 K/UL (ref 1.1–3.7)
ABSOLUTE MONO #: 0.48 K/UL (ref 0.1–1.2)
ANION GAP SERPL CALCULATED.3IONS-SCNC: 11 MMOL/L (ref 9–17)
BASOPHILS # BLD: 1 % (ref 0–2)
BASOPHILS ABSOLUTE: 0.06 K/UL (ref 0–0.2)
BUN BLDV-MCNC: 12 MG/DL (ref 8–23)
BUN/CREAT BLD: ABNORMAL (ref 9–20)
CALCIUM SERPL-MCNC: 8.3 MG/DL (ref 8.6–10.4)
CHLORIDE BLD-SCNC: 108 MMOL/L (ref 98–107)
CO2: 21 MMOL/L (ref 20–31)
CREAT SERPL-MCNC: 0.52 MG/DL (ref 0.5–0.9)
CULTURE: ABNORMAL
DIFFERENTIAL TYPE: NORMAL
EOSINOPHILS RELATIVE PERCENT: 3 % (ref 1–4)
GFR AFRICAN AMERICAN: >60 ML/MIN
GFR NON-AFRICAN AMERICAN: >60 ML/MIN
GFR SERPL CREATININE-BSD FRML MDRD: ABNORMAL ML/MIN/{1.73_M2}
GFR SERPL CREATININE-BSD FRML MDRD: ABNORMAL ML/MIN/{1.73_M2}
GLUCOSE BLD-MCNC: 121 MG/DL (ref 65–105)
GLUCOSE BLD-MCNC: 124 MG/DL (ref 70–99)
GLUCOSE BLD-MCNC: 137 MG/DL (ref 65–105)
GLUCOSE BLD-MCNC: 162 MG/DL (ref 65–105)
GLUCOSE BLD-MCNC: 182 MG/DL (ref 65–105)
HCT VFR BLD CALC: 40 % (ref 36.3–47.1)
HEMOGLOBIN: 12.1 G/DL (ref 11.9–15.1)
IMMATURE GRANULOCYTES: 0 %
LYMPHOCYTES # BLD: 30 % (ref 24–43)
Lab: ABNORMAL
MCH RBC QN AUTO: 28.5 PG (ref 25.2–33.5)
MCHC RBC AUTO-ENTMCNC: 30.3 G/DL (ref 28.4–34.8)
MCV RBC AUTO: 94.1 FL (ref 82.6–102.9)
MONOCYTES # BLD: 10 % (ref 3–12)
MRSA, DNA, NASAL: NORMAL
NRBC AUTOMATED: 0 PER 100 WBC
PDW BLD-RTO: 13.6 % (ref 11.8–14.4)
PLATELET # BLD: 238 K/UL (ref 138–453)
PLATELET ESTIMATE: NORMAL
PMV BLD AUTO: 10.6 FL (ref 8.1–13.5)
POTASSIUM SERPL-SCNC: 3.4 MMOL/L (ref 3.7–5.3)
RBC # BLD: 4.25 M/UL (ref 3.95–5.11)
RBC # BLD: NORMAL 10*6/UL
SEG NEUTROPHILS: 56 % (ref 36–65)
SEGMENTED NEUTROPHILS ABSOLUTE COUNT: 2.81 K/UL (ref 1.5–8.1)
SODIUM BLD-SCNC: 140 MMOL/L (ref 135–144)
SPECIMEN DESCRIPTION: ABNORMAL
SPECIMEN DESCRIPTION: NORMAL
WBC # BLD: 5 K/UL (ref 3.5–11.3)
WBC # BLD: NORMAL 10*3/UL

## 2021-07-09 PROCEDURE — 97163 PT EVAL HIGH COMPLEX 45 MIN: CPT

## 2021-07-09 PROCEDURE — 6370000000 HC RX 637 (ALT 250 FOR IP): Performed by: STUDENT IN AN ORGANIZED HEALTH CARE EDUCATION/TRAINING PROGRAM

## 2021-07-09 PROCEDURE — 99213 OFFICE O/P EST LOW 20 MIN: CPT

## 2021-07-09 PROCEDURE — 80048 BASIC METABOLIC PNL TOTAL CA: CPT

## 2021-07-09 PROCEDURE — 85025 COMPLETE CBC W/AUTO DIFF WBC: CPT

## 2021-07-09 PROCEDURE — 97530 THERAPEUTIC ACTIVITIES: CPT

## 2021-07-09 PROCEDURE — 36415 COLL VENOUS BLD VENIPUNCTURE: CPT

## 2021-07-09 PROCEDURE — 82947 ASSAY GLUCOSE BLOOD QUANT: CPT

## 2021-07-09 PROCEDURE — APPSS45 APP SPLIT SHARED TIME 31-45 MINUTES: Performed by: REGISTERED NURSE

## 2021-07-09 PROCEDURE — 99222 1ST HOSP IP/OBS MODERATE 55: CPT | Performed by: FAMILY MEDICINE

## 2021-07-09 PROCEDURE — 97167 OT EVAL HIGH COMPLEX 60 MIN: CPT

## 2021-07-09 PROCEDURE — 6370000000 HC RX 637 (ALT 250 FOR IP): Performed by: HEALTH CARE PROVIDER

## 2021-07-09 PROCEDURE — 6370000000 HC RX 637 (ALT 250 FOR IP): Performed by: NURSE PRACTITIONER

## 2021-07-09 PROCEDURE — 2580000003 HC RX 258: Performed by: STUDENT IN AN ORGANIZED HEALTH CARE EDUCATION/TRAINING PROGRAM

## 2021-07-09 PROCEDURE — 99222 1ST HOSP IP/OBS MODERATE 55: CPT | Performed by: NEUROLOGICAL SURGERY

## 2021-07-09 PROCEDURE — 2060000000 HC ICU INTERMEDIATE R&B

## 2021-07-09 PROCEDURE — 92523 SPEECH SOUND LANG COMPREHEN: CPT

## 2021-07-09 PROCEDURE — 97162 PT EVAL MOD COMPLEX 30 MIN: CPT

## 2021-07-09 PROCEDURE — 97535 SELF CARE MNGMENT TRAINING: CPT

## 2021-07-09 RX ORDER — ACETAMINOPHEN 500 MG
1000 TABLET ORAL EVERY 8 HOURS
Status: DISCONTINUED | OUTPATIENT
Start: 2021-07-09 | End: 2021-07-11 | Stop reason: HOSPADM

## 2021-07-09 RX ORDER — LEVETIRACETAM 500 MG/1
500 TABLET ORAL 2 TIMES DAILY
Status: DISCONTINUED | OUTPATIENT
Start: 2021-07-09 | End: 2021-07-11 | Stop reason: HOSPADM

## 2021-07-09 RX ORDER — NITROFURANTOIN 25; 75 MG/1; MG/1
100 CAPSULE ORAL EVERY 12 HOURS SCHEDULED
Status: DISCONTINUED | OUTPATIENT
Start: 2021-07-09 | End: 2021-07-11 | Stop reason: HOSPADM

## 2021-07-09 RX ORDER — LEVETIRACETAM 500 MG/1
1000 TABLET ORAL ONCE
Status: COMPLETED | OUTPATIENT
Start: 2021-07-09 | End: 2021-07-09

## 2021-07-09 RX ADMIN — NYSTATIN: 100000 CREAM TOPICAL at 07:55

## 2021-07-09 RX ADMIN — NYSTATIN: 100000 CREAM TOPICAL at 00:15

## 2021-07-09 RX ADMIN — POTASSIUM BICARBONATE 40 MEQ: 782 TABLET, EFFERVESCENT ORAL at 08:23

## 2021-07-09 RX ADMIN — DONEPEZIL HYDROCHLORIDE 10 MG: 10 TABLET, FILM COATED ORAL at 20:42

## 2021-07-09 RX ADMIN — ACETAMINOPHEN 1000 MG: 500 TABLET ORAL at 11:20

## 2021-07-09 RX ADMIN — SODIUM CHLORIDE, PRESERVATIVE FREE 10 ML: 5 INJECTION INTRAVENOUS at 08:05

## 2021-07-09 RX ADMIN — POLYETHYLENE GLYCOL 3350 17 G: 17 POWDER, FOR SOLUTION ORAL at 07:55

## 2021-07-09 RX ADMIN — LEVETIRACETAM 1000 MG: 500 TABLET, FILM COATED ORAL at 11:38

## 2021-07-09 RX ADMIN — ACETAMINOPHEN 1000 MG: 500 TABLET ORAL at 17:40

## 2021-07-09 RX ADMIN — ATORVASTATIN CALCIUM 20 MG: 20 TABLET, FILM COATED ORAL at 08:04

## 2021-07-09 RX ADMIN — SODIUM CHLORIDE, PRESERVATIVE FREE 10 ML: 5 INJECTION INTRAVENOUS at 20:42

## 2021-07-09 RX ADMIN — NYSTATIN: 100000 CREAM TOPICAL at 20:42

## 2021-07-09 RX ADMIN — SODIUM CHLORIDE 100 ML/HR: 9 INJECTION, SOLUTION INTRAVENOUS at 05:08

## 2021-07-09 RX ADMIN — INSULIN LISPRO 3 UNITS: 100 INJECTION, SOLUTION INTRAVENOUS; SUBCUTANEOUS at 21:22

## 2021-07-09 RX ADMIN — INSULIN LISPRO 3 UNITS: 100 INJECTION, SOLUTION INTRAVENOUS; SUBCUTANEOUS at 17:39

## 2021-07-09 RX ADMIN — NITROFURANTOIN MONOHYDRATE/MACROCRYSTALLINE 100 MG: 25; 75 CAPSULE ORAL at 20:42

## 2021-07-09 RX ADMIN — NITROFURANTOIN MONOHYDRATE/MACROCRYSTALLINE 100 MG: 25; 75 CAPSULE ORAL at 08:04

## 2021-07-09 RX ADMIN — LEVETIRACETAM 500 MG: 500 TABLET, FILM COATED ORAL at 22:54

## 2021-07-09 ASSESSMENT — PAIN SCALES - GENERAL
PAINLEVEL_OUTOF10: 0

## 2021-07-09 NOTE — CARE COORDINATION
Dispo planning    Called son, [place] to discuss discharge planning. Pt requiring 24/7 assistance with ADLs and ambulation. No answer. LEIGHTON Lemus discussed with [place] pt's level of assistance needed. Family refusing placement at this time. Orders placed for HC.

## 2021-07-09 NOTE — PROGRESS NOTES
ICU PROGRESS NOTE        PATIENT NAME: Diogo Hameed RECORD NO. [id number]  DATE: 7/9/2021    PRIMARY CARE PHYSICIAN: Angelica Morton MD    HD: # 1    ASSESSMENT    Patient Active Problem List   Diagnosis    [facility]'s disease ([facility] [address]    Hypokalemia    Hypoglycemia    Community acquired pneumonia of left lower lobe of lung    Sepsis due to pneumonia ([facility] [address]    Hypertension    Hyperlipidemia    Diabetes mellitus ([facility] [address]    Hyperglycemia    Hypercalcemia    Pneumonia due to organism    Fall from standing    Closed fracture of superior pubic ramus (HCC)    Head injury    [facility])       MEDICAL DECISION MAKING AND PLAN  1. Neuro: Fall w/ R frontotemporal SDH 9mm on ASA 325mg for hx CVA  1. GCS-14 baseline dementia  2. CTLS- Clear  3. MMPT: APAP  4. Keppra: 500mg BID x7d  5. NS recs: CT head stable, hold ASA and can f/u in 2wks  2. CV  1. Continue to monitor, no acute needs  3. Pulm  1. O2 to maintain >88%, poor IS coordination  4. GI/Nutrition  1. Check bedside swallow  2. Diet: regular soft as tolerated  3. Bowel regimen: hima, colace  4. BM/Flatus: none  5. Renal/lytes/fluids  1. D/c bolanos  2. Replete 50meq K+  3. UTI on arrival, start 5d macrobid  6. Heme  1. DVT prophylaxis-Hold  2. Hgb: 12.1  3. Coags: 0.9  7. Endocrine  1. Gluc: 130s  2. SSI: HISS  8. Musculoskeletal  1. Old fxs in T spine and pelvis, no acute fractures  2. PT/OT: f/u recs  9. Skin  1. Multiple skin tears and early pressure wounds, wound/ostomy consulted for recommendations  10. ID/Micro  1. Tmax: 37.4  2. WBC: 5  3. Abx: Jayda Larger  11. Family/dispo  1. Lives with son, will discuss placement  2. Transfer to step down  3. Discuss code status, palliative consulted  12. Lines  1. PIV  2.  Bolanos - remove     CHECKLIST    CAM-ICU RASS: +1  RESTRAINTS: none  IVF: none  NUTRITION: regular  ANTIBIOTICS: Macrobid  GI: None  DVT: None  GLYCEMIC CONTROL: HISS  HOB >45: Y  MOBILITY: per PT  SBT: na  IS: not coordinated    Chief Complaint: \"hello\"    SUBJECTIVE    Baldemar Reins  Seen and examined, VSS, SHAYY, pt pleasantly confused this morning, no complaints, C collar in place, small skin tears noted on RUE and RLE w/ mepilex in place, R greater trochanter pressure sore. Bolanos in place and urinating well. OBJECTIVE  VITALS: Temp: Temp: 97.9 °F (36.6 °C)Temp  Av.2 °F (36.8 °C)  Min: 97.7 °F (36.5 °C)  Max: 98.8 °F (59.9 °C) BP Systolic (70IPI), TXN:387 , Min:106 , PEZ:782   Diastolic (91HDQ), MQA:31, Min:58, Max:106   Pulse Pulse  Av.8  Min: 54  Max: 88 Resp Resp  Av  Min: 0  Max: 22 Pulse ox SpO2  Av.4 %  Min: 94 %  Max: 100 %  GENERAL: alert, cooperative, no distress  NEURO: GCS 14, COLLINS  HEENT: nares patent, no discharge, sclera anicteric  : bolanos  LUNGS: equal chest rise and fall, no increased WOB  HEART: normal rate and regular rhythm  ABDOMEN: soft, non-tender, non-distended and no guarding or peritoneal signs present  EXTREMITY: no cyanosis, clubbing or edema  SKIN: skin tears noted as above, warm, dry      Drain/tube output:    I/O last 3 completed shifts: In: 1861 [P.O.:240; I.V.:1621]  Out: 1355 [Urine:1355]  No intake/output data recorded. LAB:  CBC:   Recent Labs     21  1227 21  1630 21  0331   WBC 7.7 7.7 5.0   HGB 13.9 13.7 12.1   HCT 44.0 43.9 40.0   MCV 90.2 90.0 94.1    238 238     BMP:   Recent Labs     21  1227 21  1630 21  0331    141 140   K 4.2 4.1 3.4*    106 108*   CO2 20 24 21   BUN 22 18 12   CREATININE 0.90 0.57 0.52   GLUCOSE 326* 209* 124*         RADIOLOGY:  XR SHOULDER RIGHT (MIN 2 VIEWS)    Result Date: 2021  Chest: No acute cardiopulmonary process. However, there is a lucency through the greater tuberosity of the humerus suspicious for nondisplaced fracture. Correlate clinically. Right shoulder: Osteopenia. Advanced arthritic change. No acute fracture or dislocation. Suspected effusion.      XR HIP RIGHT (1 VIEW)    Result Date: 7/8/2021  No evidence of an acute fracture or traumatic malalignment involving the right hip. CT HEAD WO CONTRAST    Result Date: 7/8/2021  Stable study. Unchanged acute right frontotemporal subdural hematoma with no midline shift. Unchanged encephalomalacia of left hemisphere. .     CT HEAD WO CONTRAST    Result Date: 7/8/2021  New acute subdural hematomas in the right frontal lobe and anterior to the right temporal lobe. The findings were discussed with Dr. Zach Smims on 07/08/2021 at 1:27 p.m. Kiera Dawson CT CERVICAL SPINE WO CONTRAST    Result Date: 7/8/2021  No acute abnormality of the cervical spine. XR CHEST PORTABLE    Result Date: 7/8/2021  Chest: No acute cardiopulmonary process. However, there is a lucency through the greater tuberosity of the humerus suspicious for nondisplaced fracture. Correlate clinically. Right shoulder: Osteopenia. Advanced arthritic change. No acute fracture or dislocation. Suspected effusion. CT CHEST ABDOMEN PELVIS W CONTRAST    Result Date: 7/8/2021  There are no acute traumatic findings within of the chest, abdomen, or pelvis. Healing nondisplaced subacute fractures of the T12 superior endplate, of the left pubic rami, and of the left sacral ala noted. Degenerative changes of the thoracic and lumbar spine. No acute fracture. CT LUMBAR SPINE TRAUMA RECONSTRUCTION    Result Date: 7/8/2021  There are no acute traumatic findings within of the chest, abdomen, or pelvis. Healing nondisplaced subacute fractures of the T12 superior endplate, of the left pubic rami, and of the left sacral ala noted. Degenerative changes of the thoracic and lumbar spine. No acute fracture. CT THORACIC SPINE TRAUMA RECONSTRUCTION    Result Date: 7/8/2021  There are no acute traumatic findings within of the chest, abdomen, or pelvis.  Healing nondisplaced subacute fractures of the T12 superior endplate, of the left pubic rami, and of the left sacral ala noted.  Degenerative changes of the thoracic and lumbar spine. No acute fracture.            Jagruti Lazar DO  7/9/21, 4:07 PM

## 2021-07-09 NOTE — PROGRESS NOTES
Patient transferred [id number] to room 538 on monitor without incidence. All belongings sent with patient and patient's son. Report given to [facility], RN.

## 2021-07-09 NOTE — DISCHARGE INSTR - COC
Continuity of Care Form    Patient Name: Harman Cutler   :  1940  MRN:  [id number]    Admit date:  2021  Discharge date:  ***    Code Status Order: Full Code   Advance Directives:      Admitting Physician:  Rika Church MD  PCP: Ramona Parra MD    Discharging Nurse: [facility] Unit/Room#: 3730/3041-94  Discharging Unit Phone Number: ***    Emergency Contact:   Extended Emergency Contact Information  Primary Emergency Contact: [address] [place] [address] [address] Phone: [phone]  Relation: Child  Secondary Emergency Contact: [facility] Phone: [phone]  Relation: Child    Past Surgical History:  No past surgical history on file. Immunization History:   Immunization History   Administered Date(s) Administered    Tdap (Boostrix, Adacel) 2017       Active Problems:  Patient Active Problem List   Diagnosis Code    Alzheimer's disease ([facility] [address] G30.9, F02.80    Hypokalemia E87.6    Hypoglycemia E16.2    Community acquired pneumonia of left lower lobe of lung J18.9    Sepsis due to pneumonia ([facility] [address] J18.9, A41.9    Hypertension I10    Hyperlipidemia E78.5    Diabetes mellitus ([address] E11.9    Hyperglycemia R73.9    Hypercalcemia E83.52    Pneumonia due to organism J18.9    Fall from standing W19. XXXA    Closed fracture of superior pubic ramus ([facility] [address] S32.519A    Head injury S09.90XA    SDH (subdural hematoma) ([facility]) S06.5X9A       Isolation/Infection:   Isolation            No Isolation          Patient Infection Status       Infection Onset Added Last Indicated Last Indicated By Review Planned Expiration Resolved Resolved By    None active    Resolved    COVID-19 Rule Out 21 COVID-19, Rapid (Ordered)   21 Rule-Out Test Resulted    COVID-19 Rule Out 21 COVID-19, Rapid (Ordered)   21 Rule-Out Test Resulted    COVID-19 Rule Out 20 Respiratory Panel, Molecular, with COVID-19 Wound Depth (cm) 0.3 cm 07/09/21 1403   Wound Surface Area (cm^2) 1.44 cm^2 07/09/21 1403   Wound Volume (cm^3) 0.432 cm^3 07/09/21 1403   Wound Assessment Subcutaneous;Pink/red;Fibrinous 07/09/21 1403   Drainage Amount Small 07/09/21 1403   Drainage Description Serosanguinous 07/09/21 1403   Odor None 07/09/21 1403   Toña-wound Assessment Intact; Hyperpigmented 07/09/21 1403   Margins Defined edges 07/09/21 1403   Number of days: 0       Wound 07/09/21 Arm Left;Upper (Active)   Wound Etiology Skin Tear 07/09/21 1403   Dressing Status Clean;Dry; Intact 07/09/21 1403   Dressing/Treatment Foam 07/09/21 1403   Dressing Change Due 07/11/21 07/09/21 1403   Wound Length (cm) 1.5 cm 07/09/21 1403   Wound Width (cm) 1.5 cm 07/09/21 1403   Wound Depth (cm) 0.1 cm 07/09/21 1403   Wound Surface Area (cm^2) 2.25 cm^2 07/09/21 1403   Wound Volume (cm^3) 0.225 cm^3 07/09/21 1403   Wound Assessment Superficial;White Water/red 07/09/21 1403   Drainage Amount Scant 07/09/21 1403   Drainage Description Serous 07/09/21 1403   Odor None 07/09/21 1403   Toña-wound Assessment Intact 07/09/21 1403   Number of days: 0       Wound 07/09/21 Arm Right;Upper (Active)   Wound Etiology Skin Tear 07/09/21 1403   Dressing Status Clean;Dry; Intact 07/09/21 1403   Dressing/Treatment Foam 07/09/21 1403   Dressing Change Due 07/11/21 07/09/21 1403   Drainage Description Sanguinous 07/09/21 1403   Odor None 07/09/21 1403   Toña-wound Assessment Intact; Ecchymosis 07/09/21 1403   Number of days: 0       Wound 07/09/21 Pretibial Right (Active)   Wound Etiology Skin Tear 07/09/21 1403   Dressing Status New dressing applied 07/09/21 1403   Wound Cleansed Irrigated with saline 07/09/21 1403   Dressing/Treatment Foam 07/09/21 1403   Dressing Change Due 07/12/21 07/09/21 1403   Wound Length (cm) 4 cm 07/09/21 1403   Wound Width (cm) 1.5 cm 07/09/21 1403   Wound Depth (cm) 0.1 cm 07/09/21 1403   Wound Surface Area (cm^2) 6 cm^2 07/09/21 1403   Wound Volume (cm^3) 0.6 Occupational Therapy and Speech/Language Therapy  Weight Bearing Status/Restrictions: No weight bearing restirctions  Other Medical Equipment (for information only, NOT a DME order):  walker, bath bench and bedside commode  Other Treatments: Skilled Nursing Assessment    Patient's personal belongings (please select all that are sent with patient):  None    RN SIGNATURE:  Electronically signed by Sameera Ann RN on 7/10/21 at 1:14 PM EDT    CASE MANAGEMENT/SOCIAL WORK SECTION    Inpatient Status Date: ***    Readmission Risk Assessment Score:  Readmission Risk              Risk of Unplanned Readmission:  16           Discharging to Facility/ Agency   · Name: [facility] [address] [address]         Phone: [phone]       Fax: [phone]        / signature: Electronically signed by Mara Davis RN on 7/9/21 at 4:47 PM EDT    PHYSICIAN SECTION    Prognosis: Good    Condition at Discharge: Stable    Rehab Potential (if transferring to Rehab): {Prognosis:[id number]}    Recommended Labs or Other Treatments After Discharge: ***    Physician Certification: I certify the above information and transfer of Iesha Marie  is necessary for the continuing treatment of the diagnosis listed and that she requires Home Care for less 30 days.      Update Admission H&P: No change in H&P    PHYSICIAN SIGNATURE:  Electronically signed by PEYMAN Huber CNP on 7/9/21 at 3:19 PM ED

## 2021-07-09 NOTE — PROGRESS NOTES
Speech Language Pathology  Facility/Department: [facility] NEURO  Initial Speech/Language/Cognitive Assessment    NAME: Delmi Knox  : 1940   MRN: [id number]  ADMISSION DATE: 2021  ADMITTING DIAGNOSIS: has Alzheimer's disease ([address] Hypokalemia; Hypoglycemia; Community acquired pneumonia of left lower lobe of lung; Sepsis due to pneumonia ([address] Hypertension; Hyperlipidemia; Diabetes mellitus ([address] Hyperglycemia; Hypercalcemia; Pneumonia due to organism; Fall from standing; Closed fracture of superior pubic ramus ([address] Head injury; and SDH (subdural hematoma) ([address] on their problem list.     Date of Eval: 2021   Evaluating Therapist: ADRIANA Webb    RECENT RESULTS  CT OF HEAD/MRI: 21  Stable study.       Unchanged acute right frontotemporal subdural hematoma with no midline shift.       Unchanged encephalomalacia of left hemisphere. .         Primary Complaint: Delmi Knox is a 68 y.o. female who presents with fall. Patient has history of Alzheimer's, lives at home with her son, son found patient lying on the floor this morning, brought her to [facility], found to have subdural hematoma, transferred to [facility] for neurosurgical evaluation. Patient is GCS 14, either advanced Alzheimer's or significantly altered, unable to participate in HPI and ROS. Pain:  Pain Assessment  Pain Assessment: 0-10  Pain Level: 0  RASS Score: Alert and calm    Assessment:  Pt presents with severe cognitive-linguistic deficits characterized by impaired orientation, verbalization of bio information and responses to basic questions, command following, yes/no questions, naming, sentence completion, and generative naming. Patient is pleasantly confused throughout. Patient with known cognitive/communication deficits, however no family present to discuss baseline at time of evaluation. Pt. Presents with no dysarthria or O/M deficits at this time.  ST to follow up and provide treatment to address noted deficits. Education provided. ST recommends continued therapy at this time. Recommendations:  Requires SLP Intervention: Yes  Duration/Frequency of Treatment: 2-3x/week  D/C Recommendations: To be determined       Plan:   Goals:  Short-term Goals  Goal 1: Patient will verbalize biographical and orientation information with 90% accuracy with use of environmental aids. Goal 2: Patient will respond to simple yes/no questions with 90% accuracy. Goal 3: Patient will follow 1 step commands with 90% accuracy. Goal 4: Patient will complete sentences with 90% accuracy. Goal 5: Patient will name objects with 90% accuracy. Patient/family involved in developing goals and treatment plan: yes    Subjective:   Previous level of function and limitations: no family present to further discuss  General  Chart Reviewed: Yes  Patient assessed for rehabilitation services?: Yes  Family / Caregiver Present: No  Social/Functional History  Lives With: per chart- son (no family present to confirm)  Type of Home: House              Objective:     Oral/Motor  Oral Motor: Within functional limits    Auditory Comprehension  Comprehension: Exceptions  Yes/No Questions: Severe 1/5   Basic Questions: Severe 0/2  One Step Basic Commands: Moderate 1/3 improved to 3/3 with cues (patient verbally repeats commands or responds \"yes\")  Two Step Basic Commands: Severe 0/2         Expression  Primary Mode of Expression: Verbal    Verbal Expression  Verbal Expression: Exceptions to functional limits  Repetition: Mild words 3/3, sentences 2/3  Automatic Speech: Moderate counting WFL, days of week: 4/7 with cues to initiate  Confrontation: Severe 1/4  Divergent: Severe +0 improved to +1 with cues  Responsive: Severe 0/3       Motor Speech  Motor Speech: Within Functional Limits         Cognition:      Orientation  Overall Orientation Status: Impaired  Orientation Level: Oriented to person;Disoriented to place; Disoriented to time;Disoriented to situation      Prognosis:  Speech Therapy Prognosis  Prognosis: Guarded (baseline cognitive deficits)  Individuals consulted  Consulted and agree with results and recommendations: Patient    Education:  Patient Education: yes  Patient Education Response: Needs reinforcement          Therapy Time:   Individual Concurrent Group Co-treatment   Time In 1441         Time Out 1455         Minutes [address] [place], [place]  7/9/2021 3:09 PM

## 2021-07-09 NOTE — PLAN OF CARE
Problem: Falls - Risk of:  Goal: Will remain free from falls  Description: Will remain free from falls  Outcome: Ongoing  Goal: Absence of physical injury  Description: Absence of physical injury  Outcome: Ongoing     Problem: Confusion - Acute:  Goal: Absence of continued neurological deterioration signs and symptoms  Description: Absence of continued neurological deterioration signs and symptoms  Outcome: Ongoing  Goal: Mental status will be restored to baseline  Description: Mental status will be restored to baseline  Outcome: Ongoing     Problem: Discharge Planning:  Goal: Ability to perform activities of daily living will improve  Description: Ability to perform activities of daily living will improve  Outcome: Ongoing  Goal: Participates in care planning  Description: Participates in care planning  Outcome: Ongoing     Problem: Injury - Risk of, Physical Injury:  Goal: Will remain free from falls  Description: Will remain free from falls  Outcome: Ongoing  Goal: Absence of physical injury  Description: Absence of physical injury  Outcome: Ongoing     Problem: Mood - Altered:  Goal: Mood stable  Description: Mood stable  Outcome: Ongoing  Goal: Absence of abusive behavior  Description: Absence of abusive behavior  Outcome: Ongoing  Goal: Verbalizations of feeling emotionally comfortable while being cared for will increase  Description: Verbalizations of feeling emotionally comfortable while being cared for will increase  Outcome: Ongoing     Problem: Psychomotor Activity - Altered:  Goal: Absence of psychomotor disturbance signs and symptoms  Description: Absence of psychomotor disturbance signs and symptoms  Outcome: Ongoing     Problem: Sensory Perception - Impaired:  Goal: Demonstrations of improved sensory functioning will increase  Description: Demonstrations of improved sensory functioning will increase  Outcome: Ongoing  Goal: Decrease in sensory misperception frequency  Description: Decrease in sensory misperception frequency  Outcome: Ongoing  Goal: Able to refrain from responding to false sensory perceptions  Description: Able to refrain from responding to false sensory perceptions  Outcome: Ongoing  Goal: Demonstrates accurate environmental perceptions  Description: Demonstrates accurate environmental perceptions  Outcome: Ongoing  Goal: Able to distinguish between reality-based and nonreality-based thinking  Description: Able to distinguish between reality-based and nonreality-based thinking  Outcome: Ongoing  Goal: Able to interrupt nonreality-based thinking  Description: Able to interrupt nonreality-based thinking  Outcome: Ongoing     Problem: Sleep Pattern Disturbance:  Goal: Appears well-rested  Description: Appears well-rested  Outcome: Ongoing

## 2021-07-09 NOTE — PROGRESS NOTES
Neurosurgery SHELBY/Resident    Daily Progress Note   Chief Complaint   Patient presents with    Fall     7/9/2021  2:46 PM    Chart reviewed. No acute events overnight. No new complaints. Denies headache    Vitals:    07/09/21 1300 07/09/21 1311 07/09/21 1312 07/09/21 1313   BP: 130/69 130/69     Pulse: 71 68 68 71   Resp: 17 18 18 21   Temp:       TempSrc:       SpO2: 99% 99% 98% 98%   Weight:       Height:             PE:   Alert, oriented to self only  Follow some commands with encouragement  PERRL  Motor   Moves all extremities equally  No pronator drift      Lab Results   Component Value Date    WBC 5.0 07/09/2021    HGB 12.1 07/09/2021    HCT 40.0 07/09/2021     07/09/2021    ALT 18 05/12/2021    AST 35 (H) 05/12/2021     07/09/2021    K 3.4 (L) 07/09/2021     (H) 07/09/2021    CREATININE 0.52 07/09/2021    BUN 12 07/09/2021    CO2 21 07/09/2021    TSH 2.73 05/28/2017    INR 0.9 07/08/2021    LABA1C 10.0 (H) 05/28/2017    CRP 4.4 11/23/2020       Radiology   CT HEAD WO CONTRAST    Result Date: 7/8/2021  EXAMINATION: CT OF THE HEAD WITHOUT CONTRAST  7/8/2021 8:02 pm TECHNIQUE: CT of the head was performed without the administration of intravenous contrast. Dose modulation, iterative reconstruction, and/or weight based adjustment of the mA/kV was utilized to reduce the radiation dose to as low as reasonably achievable. COMPARISON: Same-day head CT HISTORY: ORDERING SYSTEM PROVIDED HISTORY: f/u SDH TECHNOLOGIST PROVIDED HISTORY: f/u SDH Reason for Exam: f/u SDH FINDINGS: There has been no interval change in 8 mm  right frontal and right temporal acute subdural hematoma. There is mild mass effect on adjacent brain sulci. No significant midline shift. The unchanged encephalomalacia changes of left hemisphere including left frontal and parietal lobes. No acute infarction or intracranial mass.  There are mild nonspecific foci of periventricular and subcortical cerebral white matter hypodensity, most likely representing chronic microangiopathic disease in this age group. The brain parenchyma is otherwise normal. The cerebellar tonsils are in normal position. The ventricles, sulci, and cisterns are mildly prominent suggestive of mild generalized volume loss. The globes and orbits are within normal limits. The visualized extracranial structures including paranasal sinuses and mastoid air cells are unremarkable. No fracture is identified. Stable study. Unchanged acute right frontotemporal subdural hematoma with no midline shift. Unchanged encephalomalacia of left hemisphere. .     CT HEAD WO CONTRAST    Result Date: 7/8/2021  EXAMINATION: CT OF THE HEAD WITHOUT CONTRAST  7/8/2021 12:59 pm TECHNIQUE: CT of the head was performed without the administration of intravenous contrast. Dose modulation, iterative reconstruction, and/or weight based adjustment of the mA/kV was utilized to reduce the radiation dose to as low as reasonably achievable. COMPARISON: None. HISTORY: ORDERING SYSTEM PROVIDED HISTORY: Syncope, weakness TECHNOLOGIST PROVIDED HISTORY: Syncope, weakness Decision Support Exception - unselect if not a suspected or confirmed emergency medical condition->Emergency Medical Condition (MA) Reason for Exam: Syncope, weakness. Hx of dementia Acuity: Acute Type of Exam: Initial FINDINGS: BRAIN/VENTRICLES: There is no evidence for mass effect or midline shift. There is an acute subdural hematoma in the right frontal region which measures 8 mm in thickness. There is also subdural hematoma just anterior to the right temporal lobe in the middle cranial fossa and laterally. This measures 8 mm in thickness. Laterally, this measures 5 mm in thickness. The previously seen bilateral low-density extra-axial fluid collections have resolved. The gray-white differentiation is maintained without evidence of an acute infarct. There is prominence of the ventricles and sulci due to global parenchymal volume loss. There are nonspecific areas of hypoattenuation within the periventricular and subcortical white matter, which likely represent chronic microvascular ischemic change. ORBITS: The visualized portion of the orbits demonstrate no acute abnormality. SINUSES: The visualized paranasal sinuses and mastoid air cells demonstrate no acute abnormality. SOFT TISSUES/SKULL: No acute abnormality of the visualized skull or soft tissues. New acute subdural hematomas in the right frontal lobe and anterior to the right temporal lobe. The findings were discussed with Dr. Gail Gonsalez on 07/08/2021 at 1:27 p.m..       A/P  68 y.o. female who presents with right sided subdural heamtoma       - No neurosurgical interventions needed  - HOB: 30 degrees  - ok for DVT prophylaxis, continue to hold home aspirin until follow up  - follow up in 2 weeks with repeat CT head  - Please call with any questions or concerns  - Called patient son, Amarilis Pennington, to discuss plan      Please contact neurosurgery with any changes in patients neurologic status.        Raphael Curran CNP  7/9/21  2:46 PM

## 2021-07-09 NOTE — CARE COORDINATION
SBIRT deferred d/t pt with Alzheimer's dementia              Alcohol Screening and Brief Intervention          Deferred [x] dementia    Completed on: 7/9/2021   WENDIE WALLACE

## 2021-07-09 NOTE — PROGRESS NOTES
Baldo Fernandez will require the following home care treatments or therapies: RN/Wound care/PT/OT/SP. Home care will be necessary because the patient is unable to leave home without maximum effort and requires assistance of wheelchair or walker and has severe confusion, disorientation, or compromised mental state . The patient is in agreement to receiving home care.

## 2021-07-09 NOTE — PROGRESS NOTES
Occupational Therapy   Occupational Therapy Initial Assessment  Date: 2021   Patient Name: Delmi Knox  MRN: [id number]     : 1940    Date of Service: 2021  Chief Complaint   Patient presents with   [facility] Fall     Discharge Recommendations:    Further therapy recommended at discharge. OT Equipment Recommendations  Equipment Needed: Yes  Mobility Devices: Maria Alejandra Flash: Rolling    Assessment   Performance deficits / Impairments: Decreased functional mobility ; Decreased ADL status; Decreased endurance;Decreased balance;Decreased strength;Decreased high-level IADLs;Decreased safe awareness;Decreased cognition  Assessment: Pt. unsafe to return to prior living arrangements due to requiring max verbal and tactile cues to complete functional mobility/functional transfers. Further therapy needed to increase ADL independence and strength  Prognosis: Poor  Decision Making: High Complexity  Patient Education: pt. educated on plan of care and purpose of eval, poor return  Barriers to Learning: cognition  REQUIRES OT FOLLOW UP: Yes  Activity Tolerance  Activity Tolerance: Treatment limited secondary to decreased cognition  Safety Devices  Safety Devices in place: Yes  Type of devices: Left in bed;Bed alarm in place;Call light within reach;Gait belt  Restraints  Initially in place: No           Patient Diagnosis(es): The encounter diagnosis was Subdural hematoma ([facility] [address] has a past medical history of Dementia ([facility] [address] [facility] Diabetes mellitus ([facility] [address] [facility] Fracture, Hyperlipidemia, and Hypertension. has no past surgical history on file.            Restrictions  Restrictions/Precautions  Restrictions/Precautions: Fall Risk (up with assist)  Required Braces or Orthoses?: Yes  Required Braces or Orthoses  Cervical: c-collar  Position Activity Restriction  Other position/activity restrictions: TLS clear, C-collar in place per trauma    Subjective   General  Patient assessed for rehabilitation services?: Yes  Additional Pertinent Hx: dementia  Family / Caregiver Present: No  Diagnosis: SDH  Pain Assessment: Denies   General Comment  Comments: RN ok'd for therapy this morning, pt. receptive and pleasant  Social/Functional History  Social/Functional History  Lives With: Son  Type of Home: House  Home Layout: Multi-level, Able to Live on Main level with bedroom/bathroom  Home Access: Stairs to enter without rails  Entrance Stairs - Number of Steps: 1 to front door, 6 to first floor  Receives Help From: Family  ADL Assistance: Needs assistance  Homemaking Assistance: Needs assistance  Ambulation Assistance: Needs assistance  Transfer Assistance: Needs assistance  Additional Comments: limited information obtained due to pt cognition, no family at bedside to venturabright. Pt answered \"I don't know to several questions\"       Objective   Vision:  (not assessed due to congition)  Hearing: Within functional limits    Orientation  Overall Orientation Status: Impaired  Orientation Level: Disoriented X4 (able to state her first name but not last)     Balance  Sitting Balance: Stand by assistance (~12 minutes @ EOB, SBA for safety)  Standing Balance: Contact guard assistance (for safety)  Standing Balance  Time: ~3 minutes  Activity: dynamic standing at bedside and side step toward [facility] for sheet management  Functional Mobility  Functional - Mobility Device: Standard Walker  Assist Level: Contact guard assistance  Functional Mobility Comments: Pt required max verbal and tactile cues to complete funcitonal transfers/functional mobility for intiatiion, contiuation, and hand placement on RW  ADL  Feeding: Setup;Verbal cueing;Stand by assistance  Grooming: Verbal cueing;Setup;Minimal assistance  UE Bathing: Verbal cueing;Setup;Minimal assistance  LE Bathing: Setup;Verbal cueing; Moderate assistance  UE Dressing: Verbal cueing;Setup;Minimal assistance  LE Dressing: Setup;Verbal cueing; Moderate assistance  Toileting: Setup;Maximum assistance  Additional Comments: requires max verbal and tactile cueing  Tone RUE  RUE Tone: Normotonic  Tone LUE  LUE Tone: Normotonic  Coordination  Movements Are Fluid And Coordinated: Yes     Bed mobility  Supine to Sit: 2 Person assistance;Minimal assistance  Sit to Supine: Minimal assistance;2 Person assistance  Scootin Person assistance;Maximal assistance  Transfers  Sit to stand: Minimal assistance  Stand to sit: Minimal assistance  Transfer Comments: w/ RW and max verbal and tactile cueing for hand placement     Cognition  Overall Cognitive Status: Exceptions  Arousal/Alertness: Inconsistent responses to stimuli  Following Commands: Inconsistently follows commands  Attention Span: Difficulty attending to directions  Memory: Decreased recall of precautions;Decreased recall of recent events;Decreased short term memory  Safety Judgement: Decreased awareness of need for safety  Problem Solving: Assistance required to implement solutions;Assistance required to generate solutions;Assistance required to identify errors made;Assistance required to correct errors made  Insights: Not aware of deficits  Initiation: Requires cues for all  Sequencing: Requires cues for all  Cognition Comment: Pt. requires max verbal and tactile cues to intiate and follow sequencing functional mobility        Sensation  Overall Sensation Status:  (not assessed due to congition)        LUE AROM (degrees)  LUE AROM : Exceptions  LUE General AROM: AAROM  L Shoulder Flexion 0-180: 0-90 due to c-cpine precautions  L Elbow Flexion 0-145: WFL  L Elbow Extension 145-0: WFL  Left Hand AROM (degrees)  Left Hand AROM: WFL  Left Hand General AROM: AAROM  RUE AROM (degrees)  RUE AROM : Exceptions  RUE General AROM: AAROM  R Shoulder Flexion 0-180: 0-90 due to c-spine precautions  R Elbow Flexion 0-145: WFL  R Elbow Extension 145-0: WFL  Right Hand AROM (degrees)  Right Hand AROM: WFL  Right Hand General AROM: AAROM  LUE Strength  Gross LUE Strength: Exceptions to [facility]  L Shoulder Flex:  NT (not assessed due to c-spine precautions)  L Hand General: 3+/5  RUE Strength  Gross RUE Strength: Exceptions to [facility]  R Shoulder Flex: NT (not assessed due to c-spine precautions)  R Hand General: 3+/5                   Plan   Plan  Times per week: 2-3 x/wk      AM-PAC Score        AM-PAC Inpatient Daily Activity Raw Score: 15 (07/09/21 1036)  AM-PAC Inpatient ADL T-Scale Score : 34.69 (07/09/21 1036)  ADL Inpatient CMS 0-100% Score: 56.46 (07/09/21 1036)  ADL Inpatient CMS G-Code Modifier : CK (07/09/21 1036)    Goals  Short term goals  Time Frame for Short term goals: pt. will; by d/c  Short term goal 1: complete UE ADLs with SBA  Short term goal 2: complete LE ADLs with min A  Short term goal 3: follow 60% of simple commands w/ <3 verbal or tactile cues for intiation and sequencing  Short term goal 4: demo dynamic standing balance using LRD with SBA for ~5 minutes  Short term goal 5: demo functional mobility/functional transfer with SBA using LRD  Short term goal 6: maintain attention to task for 5+ minutes with 1-3 VC for redirection       Therapy Time   Individual Concurrent Group Co-treatment   Time In 0924         Time Out 0943         Minutes 19      co-eval w/ PT   Timed Code Treatment Minutes: 8 Minutes       Spencer Coto, OT/S

## 2021-07-09 NOTE — PLAN OF CARE
Problem: Falls - Risk of:  Goal: Will remain free from falls  Description: Will remain free from falls  7/9/2021 0824 by Zainab Ventura RN  Outcome: Ongoing     Problem: Falls - Risk of:  Goal: Absence of physical injury  Description: Absence of physical injury  7/9/2021 0824 by Zainab Ventura RN  Outcome: Ongoing     Problem: Confusion - Acute:  Goal: Absence of continued neurological deterioration signs and symptoms  Description: Absence of continued neurological deterioration signs and symptoms  7/9/2021 0824 by Zainab Ventura RN  Outcome: Ongoing     Problem: Discharge Planning:  Goal: Ability to perform activities of daily living will improve  Description: Ability to perform activities of daily living will improve  7/9/2021 0824 by Zainab Ventura RN  Outcome: Ongoing     Problem: Injury - Risk of, Physical Injury:  Goal: Will remain free from falls  Description: Will remain free from falls  7/9/2021 0824 by Zainab Ventura RN  Outcome: Ongoing     Problem: Injury - Risk of, Physical Injury:  Goal: Absence of physical injury  Description: Absence of physical injury  7/9/2021 0824 by Zainab Ventura RN  Outcome: Ongoing     Problem: Skin Integrity:  Goal: Will show no infection signs and symptoms  Description: Will show no infection signs and symptoms  7/9/2021 0825 by Zainab Ventura RN  Outcome: Ongoing     Problem: Skin Integrity:  Goal: Absence of new skin breakdown  Description: Absence of new skin breakdown  7/9/2021 0825 by Zainab Ventura RN  Outcome: Ongoing

## 2021-07-09 NOTE — PLAN OF CARE
Problem: Falls - Risk of:  Goal: Will remain free from falls  Description: Will remain free from falls  7/9/2021 1729 by Sierra Dasilva RN  Outcome: Met This Shift  7/9/2021 0824 by Kiran Jensen RN  Outcome: Ongoing  Goal: Absence of physical injury  Description: Absence of physical injury  7/9/2021 1729 by Sierra Dasilva RN  Outcome: Met This Shift  7/9/2021 0824 by Kiran Jensen RN  Outcome: Ongoing     Problem: Confusion - Acute:  Goal: Absence of continued neurological deterioration signs and symptoms  Description: Absence of continued neurological deterioration signs and symptoms  7/9/2021 1729 by Sierra Dasilva RN  Outcome: Met This Shift  7/9/2021 0824 by Kiran Jensen RN  Outcome: Ongoing  Goal: Mental status will be restored to baseline  Description: Mental status will be restored to baseline  Outcome: Met This Shift     Problem: Injury - Risk of, Physical Injury:  Goal: Will remain free from falls  Description: Will remain free from falls  7/9/2021 1729 by Sierra Dasilva RN  Outcome: Met This Shift  7/9/2021 0824 by Kiran Jensen RN  Outcome: Ongoing  Goal: Absence of physical injury  Description: Absence of physical injury  7/9/2021 1729 by Sierra Dasilva RN  Outcome: Met This Shift  7/9/2021 0824 by Kiran Jensen RN  Outcome: Ongoing     Problem: Mood - Altered:  Goal: Mood stable  Description: Mood stable  Outcome: Met This Shift  Goal: Absence of abusive behavior  Description: Absence of abusive behavior  Outcome: Met This Shift  Goal: Verbalizations of feeling emotionally comfortable while being cared for will increase  Description: Verbalizations of feeling emotionally comfortable while being cared for will increase  Outcome: Met This Shift     Problem: Psychomotor Activity - Altered:  Goal: Absence of psychomotor disturbance signs and symptoms  Description: Absence of psychomotor disturbance signs and symptoms  Outcome: Met This Shift     Problem: Sensory Perception - Impaired:  Goal: Demonstrations of improved sensory functioning will increase  Description: Demonstrations of improved sensory functioning will increase  Outcome: Met This Shift  Goal: Decrease in sensory misperception frequency  Description: Decrease in sensory misperception frequency  Outcome: Met This Shift  Goal: Able to refrain from responding to false sensory perceptions  Description: Able to refrain from responding to false sensory perceptions  Outcome: Met This Shift  Goal: Demonstrates accurate environmental perceptions  Description: Demonstrates accurate environmental perceptions  Outcome: Met This Shift  Goal: Able to distinguish between reality-based and nonreality-based thinking  Description: Able to distinguish between reality-based and nonreality-based thinking  Outcome: Met This Shift  Goal: Able to interrupt nonreality-based thinking  Description: Able to interrupt nonreality-based thinking  Outcome: Met This Shift     Problem: Sleep Pattern Disturbance:  Goal: Appears well-rested  Description: Appears well-rested  Outcome: Met This Shift     Problem: Skin Integrity:  Goal: Will show no infection signs and symptoms  Description: Will show no infection signs and symptoms  7/9/2021 1729 by Kaur Marvin RN  Outcome: Met This Shift  7/9/2021 0825 by Janice Thurman RN  Outcome: Ongoing  7/9/2021 0825 by Janice Thurman RN  Outcome: Ongoing  Goal: Absence of new skin breakdown  Description: Absence of new skin breakdown  7/9/2021 1729 by Kaur Marvin RN  Outcome: Met This Shift  7/9/2021 0825 by Janice Thurman RN  Outcome: Ongoing  7/9/2021 0825 by Janice Thurman RN  Outcome: Ongoing     Problem: Nutrition  Goal: Optimal nutrition therapy  7/9/2021 1729 by Kaur Marvin RN  Outcome: Met This Shift  7/9/2021 1259 by Ok Mayorga RD, LD  Note: Nutrition Problem #1: Increased nutrient needs  Intervention: Food and/or Nutrient Delivery: Continue Current Diet, Start Oral Nutrition Supplement  Nutritional Goals: PO intake to meet >75% of estimated nutrition needs

## 2021-07-09 NOTE — CARE COORDINATION
Case Management Initial Discharge Plan  Agnes Watkins,             Called pt son (caregiver) Bar Soria to complete intake assessment due to history of dementia/confusion   Information verified: address, contacts, phone number, , insurance Yes  Insurance Provider: [facility]    Emergency Contact/Next of Kin name & number: [id number]/[phone]  [id number]/[phone]  Who are involved in patient's support system? Son, family members    PCP: Marlin Trujillo MD  Date of last visit: one month ago      Discharge Planning    Living Arrangements:  [address] 1[address]  1 stairs to climb to get into front door,   6 stairs to climb to reach second floor  Location of bedroom/bathroom in home: on first level but 6 steps to get in    Patient able to perform ADL's:Independent    Current Services (outpatient & in home) [facility] for [facility]  DME equipment: has a walker but does not use  DME provider: n/a    Is patient receiving oral anticoagulation therapy? Yes, apirin    If indicated:   Physician managing anticoagulation treatment: n/a  Where does patient obtain lab work for ATC treatment? n/a      Potential Assistance Needed:  [facility], [address] [place] [address]    Patient agreeable to home care: Yes  Freedom of choice provided:  no, family would like to use [facility]. Prior SNF/Rehab Placement and Facility: yes  Agreeable to SNF/Rehab: No  [place] of choice provided: n/a     Evaluation: no    Expected Discharge date:  21    Patient expects to be discharged to:  Home    If home: is the family and/or caregiver wiling & able to provide support at home? yes  Who will be providing this support?  SonBar    Follow Up Appointment: Best Day/ Time:      Transportation provider: family  Transportation arrangements needed for discharge: No    Readmission Risk              Risk of Unplanned Readmission:  16             Does patient have a readmission risk score greater than 14?: Yes  If yes, follow-up appointment must be made within 7 days of discharge. Goals of Care:       Educated patient son Jade Varner on transitional options, provided freedom of choice and are agreeable with plan      Discharge Plan: Completed intake assessment with patient son Jade Varner as pt has history of dementia and is confused per nursing notes. Pt lives with son. He is her primary caregiver. Pt had [facility] for home care prior to admission to hospital and Jade Varner would like to use them again. He states that patient was sent to SNF previously but was not treated well so they do not want patient going to SNF. CM discusses that patient will need to work with therapy to evaluate needs and capabilities but will send referral to [facility] as requested. Continue to follow for needs. Per son pt is pretty independent at home. She has a walker but rarely uses at home. Pt will need PCP follow up appt. [address] [place] [address] OF [facility]. and spoke with Ar[facility]. Arlet confirms that pt is current with services through them.          Electronically signed by Ramandeep Christine RN on 7/9/21 at 10:22 AM EDT

## 2021-07-09 NOTE — PROGRESS NOTES
Physical Therapy    [address]  Initial Assessment    NAME: Delmi Knox  : 1940  MRN: [id number]  Chief Complaint   Patient presents with    Fall     Date of Service: 2021    Discharge Recommendations: Further therapy recommended at discharge. PT Equipment Recommendations  Equipment Needed: No (unable to assess due to pt cognitive status; continue to assess DME needs)    Assessment   Body structures, Functions, Activity limitations: Decreased functional mobility ; Decreased ADL status; Decreased ROM; Decreased strength;Decreased endurance;Decreased balance;Decreased posture  Assessment: Pt ambualted 2ft Min A and RW, completed transfers with CGA and RW, and bed mobility with Min A x2. Pt could beneift from continued PT in order to improve functional mobility. Pt currently requires 24 hour care due to increased risk of falls due to significant confusion and level of assistance required. Prognosis: Fair  Decision Making: High Complexity  PT Education: Goals; General Safety;PT Role;Plan of Care;Precautions;Transfer Training;Gait Training;Functional Mobility Training  Barriers to Learning: pt current cognitive status  REQUIRES PT FOLLOW UP: Yes  Activity Tolerance  Activity Tolerance: Patient Tolerated treatment well       Patient Diagnosis(es): The primary encounter diagnosis was Subdural hematoma ([address] A diagnosis of SDH (subdural hematoma) (HCC) was also pertinent to this visit. has a past medical history of Dementia ([address] Diabetes mellitus ([address] Fracture, Hyperlipidemia, and Hypertension. has no past surgical history on file.     Restrictions  Restrictions/Precautions  Restrictions/Precautions: Fall Risk (up with assist)  Required Braces or Orthoses?: Yes  Required Braces or Orthoses  Cervical: c-collar  Position Activity Restriction  Other position/activity restrictions: TLS clear, C-collar in place per trauma  Vision/Hearing  Hearing: Within functional limits Subjective  General  Patient assessed for rehabilitation services?: Yes  Response To Previous Treatment: Not applicable  Family / Caregiver Present: No  Follows Commands: Impaired  Other (Comment): intermittently following verbal and tactile cues  Subjective  Subjective: Pt and RN agreeable to PT. Pt in bed upon arrival and exit. Pt pleasant and confused at this date. Pain Screening  Patient Currently in Pain: Denies  Vital Signs  Patient Currently in Pain: Denies       Orientation  Orientation  Overall Orientation Status: Impaired  Orientation Level: Disoriented to situation;Disoriented to time;Disoriented to place (oriented to first name only)  Social/Functional History  Social/Functional History  Lives With:  (no family present to confirm)  Type of Home: House  Home Layout: Multi-level, Able to Live on Main level with bedroom/bathroom  Home Access: Stairs to enter without rails  Entrance Stairs - Number of Steps: 1 to front door, 6 to first floor  Receives Help From: Family  ADL Assistance: Needs assistance  Homemaking Assistance: Needs assistance  Ambulation Assistance: Needs assistance  Transfer Assistance: Needs assistance  Additional Comments: limited information obtained due to pt cognition, no family at bedside to francia.  Pt answered \"I don't know to several questions\"  Cognition   Cognition  Overall Cognitive Status: Exceptions  Arousal/Alertness: Inconsistent responses to stimuli  Following Commands: Inconsistently follows commands  Attention Span: Difficulty attending to directions  Memory: Decreased recall of precautions;Decreased recall of recent events;Decreased short term memory  Safety Judgement: Decreased awareness of need for safety  Problem Solving: Assistance required to implement solutions;Assistance required to generate solutions;Assistance required to identify errors made;Assistance required to correct errors made  Insights: Not aware of deficits  Initiation: Requires cues for all  Sequencing: Requires cues for all  Cognition Comment: Pt. requires max verbal and tactile cues to intiate and follow sequencing functional mobility    Objective          Joint Mobility  Spine: increased kyphotic curve  ROM RLE: knee extension lacking ~15 degrees, PROM ankle DF to neutral, PROM hip flexion WFL  ROM LLE: knee extension lacking ~15degrees, ankle ROM to neutral, PROM hip flexion decreased compared to RLE  ROM RUE: PROM shouler flexion ~90degrees, otherwise AROM WFL  ROM LUE: PROM shoudler flexion ~90degrees, Otherwise AROM WFL  Strength RLE  Strength RLE: Exception  Strength LLE  Strength LLE: Exception  Strength RUE  Strength RUE: Exception  Strength LUE  Strength LUE: Exception  Strength Other  Other: difficult to formally assess due to pt cognition, and difficulty following commands  Tone RLE  RLE Tone: Normotonic  Tone LLE  LLE Tone:  (resistance noted with left hip PROM flexion)  Sensation  Overall Sensation Status:  (unable to formally assess due to pt cognition)  Bed mobility  Supine to Sit: Minimal assistance;2 Person assistance  Sit to Supine: Minimal assistance;2 Person assistance  Scootin Person assistance;Dependent/Total  Comment: assist for trunk and BLE progression; pt unable to assist with unweighting to scoot at this date; max verbal and tactile cues for initation and sequencing  Transfers  Sit to Stand: Contact guard assistance  Stand to sit: Contact guard assistance  Comment: max verbal and tactile cues with fair return for initiation and sequencing  Ambulation  Ambulation?: Yes  Ambulation 1  Surface: level tile  Device: Rolling Walker  Assistance: Minimal assistance  Gait Deviations: Slow Mirela;Decreased step length;Decreased step height  Distance: 2ft  Comments: max verbal and tactile cues for EOB ambulation, Min A for progression of RW  Stairs/Curb  Stairs?: No     Balance  Posture: Fair  Sitting - Static: Fair;+  Sitting - Dynamic: Fair  Standing - Static: Fair  Standing - Dynamic: Fair;-  Comments: standing balance assessed with RW        Plan   Plan  Times per week: 5-6x/wk  Current Treatment Recommendations: Strengthening, ROM, Balance Training, Functional Mobility Training, Transfer Training, ADL/Self-care Training, Gait Training, Endurance Training, Home Exercise Program, Safety Education & Training, Patient/Caregiver Education & Training, Positioning  Safety Devices  Type of devices: All fall risk precautions in place, Bed alarm in place, Call light within reach, Gait belt, Patient at risk for falls, Left in bed, Nurse notified  Restraints  Initially in place: No      AM-PAC Score  AM-PAC Inpatient Mobility Raw Score : 15 (07/09/21 1038)  AM-PAC Inpatient T-Scale Score : 39.45 (07/09/21 1038)  Mobility Inpatient CMS 0-100% Score: 57.7 (07/09/21 1038)  Mobility Inpatient CMS G-Code Modifier : CK (07/09/21 1038)          Goals  Short term goals  Time Frame for Short term goals: 14 visits  Short term goal 1: Ambulate 100ft CGA with RW  Short term goal 2: Perform transfers with supervision  Short term goal 3: Perform bed mobility with SBA  Short term goal 4: Demo Good- dynamic standing balance       Therapy Time   Individual Concurrent Group Co-treatment   Time In 0924         Time Out 0944         Minutes 20         Timed Code Treatment Minutes: 8 Minutes       Inocencio Ayers    Evaluation/treatment performed by Student PT under the supervision of co-signing PT who agrees with all evaluation/treatment and documentation.

## 2021-07-09 NOTE — PROGRESS NOTES
Nutrition Assessment     Type and Reason for Visit: Initial, Positive Nutrition Screen (Wounds)    Nutrition Recommendations/Plan:   - Continue Regular Diet with 4 carbohydrate choices per meal  - Start diabetic oral nutrition supplements (Glucerna) and provide 2x/d with breakfast and dinner  - Monitor/encourage PO intakes      Nutrition Assessment:  Patient seen for wounds. Patient admitted with multiple skin tears and abrasions. Wound eval pending. Per RN, patient consumed % of breakfast this morning. Will add Glucerna supplements 2x/d to provide additional calories/protein to aid in wound healing. Labs/Meds reviewed. Malnutrition Assessment:  Malnutrition Status: No malnutrition    Estimated Daily Nutrient Needs:  Energy (kcal): 5984-8591 kcal/d (27-30 kcal/kg); Weight Used for Energy Requirements:  Admission     Protein (g): 60-75 g protein/d (1.2-1.4 g/kg); Weight Used for Protein Requirements:  Admission        Fluid (ml/day): 3883-1163 mL fluid/d or per MD; Weight Used for Fluid Requirements:  1 ml/kcal      Nutrition Related Findings: Labs: K 3.4 (L). Meds: Humalog, Glycolax. Date of last BM unknown. Hypoactive bowel sounds. Current Nutrition Therapies:    ADULT DIET;  Regular; 4 carb choices (60 gm/meal)  Adult Oral Nutrition Supplement; Diabetic Oral Supplement    Anthropometric Measures:  · Height: 5' 4\" (162.6 cm)  · Current Body Wt: 113 lb 8.6 oz (51.5 kg)   · BMI: 19.5    Nutrition Diagnosis:   · Increased nutrient needs related to increase demand for energy/nutrients as evidenced by wounds    Nutrition Interventions:   Food and/or Nutrient Delivery:  Continue Current Diet, Start Oral Nutrition Supplement  Nutrition Education/Counseling:  No recommendation at this time   Coordination of Nutrition Care:  Continue to monitor while inpatient    Goals:  PO intake to meet >75% of estimated nutrition needs       Nutrition Monitoring and Evaluation:   Behavioral-Environmental Outcomes:  None Identified   Food/Nutrient Intake Outcomes:  Food and Nutrient Intake, Supplement Intake  Physical Signs/Symptoms Outcomes:  Biochemical Data, GI Status, Nutrition Focused Physical Findings, Weight, Skin     Discharge Planning:     Too soon to determine     Electronically signed by Fidelia Toribio RD, LD on 7/9/21 at 12:58 PM EDT    Contact: [phone]

## 2021-07-09 NOTE — CARE COORDINATION
Transitional planning. Spoke to [employer], Trauma RN. She had concerns of pt returning home and wanted to talk to caregiver. Called pt's son Carolina Petit (POA), pt lives with son and he is there 24/7. Family refuses SNF, they would like to continue with [facility]. Pt's son Carolina Petit will transport pt when discharged. Informed Shari of above information. She will place [facility]. order and inform Trauma team of discharge plans.

## 2021-07-09 NOTE — CONSULTS
Palliative Care Inpatient Consult    NAME:  Wild Noel  MEDICAL RECORD NUMBER:  [id number]  AGE: 68 y.o. GENDER: female  : 1940  TODAY'S DATE:  2021    Reasons for Consultation:    Symptom and/or pain management  Provision of information regarding PC and/or hospice philosophies  Complex, time-intensive communication and interdisciplinary psychosocial support  Clarification of goals of care and/or assistance with difficult decision-making  Guidance in regards to resources and transition(s)    Members of PC team contributing to this consultation are :  Dr. Skyler Montanez palliative care attending  History of Present Illness     The patient is a 68 y.o. Non-/non  female who presents with Fall      Referred to Palliative Care by   [x] Physician   [] Nursing  [] Family Request   [] Other:       She was admitted to the trauma service for SDH (subdural hematoma) ([facility] [S06.5X9A]. Her hospital course has been associated with <principal problem not specified>. The patient has a complicated medical history and has been hospitalized since 2021  4:16 PM.  The history has been obtained from patient's records. The patient was transferred from [facility] for further management. Patient had a fall at home and was found by her son on the floor near her bed and also had stool incontinence. As per patient's son patient may have to try to get up during the night and fallen. Patient also has baseline dementia. Patient's CT head showed new acute subdural hematoma's in the right frontal lobe and anterior to the right temporal lobe and patient was transferred here. Neurosurgery was consulted and do not recommend any surgical intervention as of now. Palliative care consulted for CODE STATUS discussion and family support.     Active Hospital Problems    Diagnosis Date Noted    SDH (subdural hematoma) ([facility] [S06.5X9A] 2021       PAST MEDICAL HISTORY      Diagnosis Date    Dementia ([address]     Diabetes mellitus ([facility]     Fracture     proximal lt humerus. no surgery. 10/2018    Hyperlipidemia     Hypertension        PAST SURGICAL HISTORY  No past surgical history on file. SOCIAL HISTORY  Social History     Tobacco Use    Smoking status: Former Smoker    Smokeless tobacco: Never Used    Tobacco comment: quit many years ago, cannot remember date   Substance Use Topics    Alcohol use: No    Drug use: No       ALLERGIES  Allergies   Allergen Reactions    Penicillins     Tramadol Nausea Only     Pt states she feels loopy and has loss of appetite          MEDICATIONS  Current Medications    nitrofurantoin (macrocrystal-monohydrate)  100 mg Oral 2 times per day    atorvastatin  20 mg Oral Daily    donepezil  10 mg Oral Nightly    insulin lispro  0-18 Units Subcutaneous Q4H    sodium chloride flush  5-40 mL Intravenous 2 times per day    polyethylene glycol  17 g Oral Daily    nystatin   Topical BID     sodium chloride flush  IV Drips/Infusions    Home Medications  No current facility-administered medications on file prior to encounter.      Current Outpatient Medications on File Prior to Encounter   Medication Sig Dispense Refill    vitamin D (ERGOCALCIFEROL) 1.25 MG (17320 UT) CAPS capsule Take 1 capsule by mouth once a week 8 capsule 0    dipyridamole (PERSANTINE) 25 MG tablet Take 25 mg by mouth 3 times daily       SITagliptin (JANUVIA) 25 MG tablet Take 25 mg by mouth daily      potassium chloride (KLOR-CON M) 20 MEQ extended release tablet Take 1 tablet by mouth daily (with breakfast) 60 tablet 3    atorvastatin (LIPITOR) 20 MG tablet Take 20 mg by mouth daily      donepezil (ARICEPT) 10 MG tablet Take 10 mg by mouth nightly      glipiZIDE (GLUCOTROL) 5 MG tablet Take 5-10 mg by mouth See Admin Instructions Indications: Takes 10mg AM and 5mg PM       lisinopril (PRINIVIL;ZESTRIL) 20 MG tablet Take 20 mg by mouth daily      metFORMIN (GLUCOPHAGE) 1000 MG tablet Take 1,000 mg by mouth 2 times daily (with meals)         Data         /66   Pulse 68   Temp 97.8 °F (36.6 °C) (Oral)   Resp 15   Ht 5' 4\" (1.626 m)   Wt 113 lb 8.6 oz (51.5 kg)   SpO2 98%   BMI 19.49 kg/m²     Wt Readings from Last 3 Encounters:   07/08/21 113 lb 8.6 oz (51.5 kg)   05/13/21 120 lb (54.4 kg)   05/12/21 120 lb (54.4 kg)        Code Status: Full Code     ADVANCED CARE PLANNING:  Patient has capacity for medical decisions: no  Health Care Power of : yes  Living Will: no     Personal, Social, and Family History  Marital Status:   Living situation: with family:  son  Importance of eliceo/Episcopal/spiritual beliefs: [] Very [x] Somewhat [] Not   Psychological Distress: mild  Does patient understand diagnosis/treatment? no  Does caregiver understand diagnosis/treatment? yes    Past Medical History:   Diagnosis Date    [facility]     Diabetes mellitus ([facility]     Fracture     proximal lt humerus. no surgery.   10/2018    Hyperlipidemia     Hypertension          Family History   Family history unknown: Yes       Social History     Tobacco Use    Smoking status: Former Smoker    Smokeless tobacco: Never Used    Tobacco comment: quit many years ago, cannot remember date   Substance Use Topics    Alcohol use: No    Drug use: No           Assessment        REVIEW OF SYSTEMS  Constitutional: no fever, no chills or weight loss  Eyes: no eye pain or blurred vision  ENT: no hearing loss, congestion, or difficulty swallowing   Respiratory: no wheezing, chest tightness, or shortness of breath   Cardiovascular: no chest pain or pressure, no palpitations, no diaphoresis   Gastrointestinal: no nausea, vomiting,abdominal pain, diarrhea or constipation, no melena   Genitourinary: no dysuria, frequency, hematuria, or nocturia   Musculoskeletal: no myalgias or arthralgias, no back pain   Skin: no rashes or sores   Neurological: no focal weakness, numbness, tingling or headache, no seizures    PHYSICAL around in the house and can eat and drink by herself  Milton Adorno stated that he has brought hospital bed for the patient at home but she gets up at night and this is when he found her fallen in her room  I explained to [address] that since he is POA for health for the patient hands any decision that has to be made for this patient he will be her legal spokesperson  I explained the different types of codes to Milton Adorno and told him that he can take his time and think about them before making any decision as he stated that \" this is a very tough decision to make \"  I offered comfort and emotional support to the patient and family        [address] (ACP) Physician/NP/PA (Provider) Abeba Lucas      Date of ACP Conversation: 7/8/2021    Conversation Conducted with: Patient does not have decision as of now    Health Care Decision Maker:    Current Designated Health Care Decision Maker:    Patient has POA paperwork for health and her son Aurelia Gayle ([phone]) is her POA for health    Care Preferences:    Hospitalization: \"If your health worsens and it becomes clear that your chance of recovery is unlikely, what would your preference be regarding hospitalization? \"  Currently hospitalized    Ventilation: \"If you were in your present state of health and suddenly became very ill and were unable to breathe on your own, what would your preference be about the use of a ventilator (breathing machine) if it was available to you? \"    Full code    \"If your health worsens and it becomes clear that your chance of recovery is unlikely, what would your preference be about the use of a ventilator (breathing machine) if it was available to you? \"   Full code    Resuscitation:  \"CPR works best to restart the heart when there is a sudden event, like a heart attack, in someone who is otherwise healthy.  Unfortunately, CPR does not typically restart the heart for people who have serious health conditions or who are Patient and Family    [x] Family or Healthcare DPOA independently    [] Unable to discuss with patient, family/DPOA not present    3- Code Status  Full Code    4- Other recommendations   - We will continue to provide comfort and support to the patient and the family  Please call with any palliative questions or concerns. Palliative Care Team is available via perfect serve or via phone. Palliative Care will continue to follow Ms. Blankenship's care as needed. Thank you for allowing Palliative Care to participate in the care of Ms. Lannie Boast . This note has been dictated by dragon, typing errors may be a possibility.     The total time I spent in seeing the patient, discussing goals of care, advanced directives, code status, greater than 50% time in counseling and other major issues was more than 60 minutes      Electronically signed by   Moon Hall MD  Palliative Care Team  on 7/9/2021 at 9:04 AM    Palliative care office: [phone]

## 2021-07-09 NOTE — PROGRESS NOTES
[address] [employer]  Speech Language Pathology    Date: 7/9/2021  Patient Name: Zacarias Moura  YOB: 1940   AGE: 68 y.o. MRN: [id number]        Patient Not Available for Speech Therapy     Due to:  [] Testing  [] Hemodialysis  [] Cancelled by RN  [] Surgery   [] Intubation/Sedation/Pain Medication  [] Medical instability  [x] Other: Per RN, pt with wound care and then being transferred to [facility].  ST to attempt in PM as able    Next scheduled treatment: 7/9 in PM as able; 7/10  Completed by: Santiago Edwards M.S. [address] [facility]

## 2021-07-09 NOTE — FLOWSHEET NOTE
[facility]     Emergency/Trauma Note    PATIENT NAME: Seth Baker    Shift date: 7.8.2021  Shift day: Thursday   Shift # 2    Room # 0115/0115-01   Name: Seth Baker            Age: 68 y.o. Gender: female          Religious: [address] [place] [address] of Mandaen: unknown    Trauma/Incident type: Adult Trauma Priority  Admit Date & Time: 7/8/2021  4:16 PM  TRAUMA NAME: None    ADVANCE DIRECTIVES IN CHART? Yes    NAME OF DECISION MAKER: Sebastian Jay    RELATIONSHIP OF DECISION MAKER TO PATIENT: Son    PATIENT/EVENT DESCRIPTION:  Seth Baker is a 68 y.o. female who arrived as a TRAUMA PRIORITY due to a transfer. Pt to [facility]/0115-01. SPIRITUAL ASSESSMENT/INTERVENTION:  Patient appears to be calm and coping. States that she does not want family contacted at this time. Son Lonza List - [phone]) called the hospital and states that he lives with the patient and takes care of her. He states that he will not be up to the hospital today but other son may. Son would like updates on patient when possible. States that patient has dementia.  provided space for patient to express feelings, thoughts, and concerns. Determined support is available. Son states patient has dementia and diabetes. PATIENT BELONGINGS:  No belongings noted    ANY BELONGINGS OF SIGNIFICANT VALUE NOTED:  None    REGISTRATION STAFF NOTIFIED? Yes      WHAT IS YOUR SPIRITUAL CARE PLAN FOR THIS PATIENT?:  Chaplains will remain available to offer spiritual and emotional support as needed. Electronically signed by Jessie Bang on 7/8/2021 at [address] [facility]  [phone]       07/08/21 5002   Encounter Summary   Services provided to: Patient; Family   Referral/Consult From: Multi-disciplinary team   Support System Children   Continue Visiting   ([phone])   Complexity of Encounter Moderate   Length of Encounter 30 minutes Spiritual Assessment Completed Yes   Crisis   Type Trauma  (Priority)   Assessment Calm; Approachable;Coping   Intervention Active listening;Explored feelings, thoughts, concerns;Explored coping resources; Discussed illness/injury and it's impact;Sustaining presence/ Ministry of presence   Outcome Expressed gratitude;Engaged in conversation;Expressed feelings/needs/concerns     Electronically signed by Charity Spencer on 7/8/2021 at 11:13 PM

## 2021-07-09 NOTE — PROGRESS NOTES
[facility] Wound Ostomy Continence Nurse  Consult Note       NAME:  Encinas Cherise  MEDICAL RECORD NUMBER:  [id number]  AGE: 68 y.o. GENDER: female  : 1940  TODAY'S DATE:  2021    Subjective:     Reason for WOCN Evaluation and Assessment: \"skin tears and abrasions\"      Ce Luong is a 68 y.o. female referred by:   [x] Physician  [] Nursing  [] Other:     Wound Identification:  Wound Type: pressure and traumatic  Contributing Factors: diabetes, poor glucose control, chronic pressure, decreased mobility, shear force, malnutrition, incontinence of stool, incontinence of urine and advanced age, AMS        History per patient's son at bedside. Jimbo Mcfarlane was recently admitted at a SNF. She developed severe caustic dermatitis from dual incontinence. She had also developed pressure injuries on her right elbow and right lateral hip. She has innumerous small abrasions and bruises on all four extremities. Her son took her to his home to reside. He provides incontinence care multiple times daily and also assists patient to shower at least daily. He has been using antibiotic ointment on the right elbow and hip wounds. Skin tears have been an on-going problem. She fell last night and now has acute skin tears to the right shin, upper arm, and left upper arm. First aid has been provided by the primary RN's in the ICU.      Objective:      /69   Pulse 71   Temp 97.9 °F (36.6 °C) (Oral)   Resp 21   Ht 5' 4\" (1.626 m)   Wt 113 lb 8.6 oz (51.5 kg)   SpO2 98%   BMI 19.49 kg/m²   Arnold Risk Score: Arnold Scale Score: 13    LABS    CBC:   Lab Results   Component Value Date    WBC 5.0 2021    RBC 4.25 2021    HGB 12.1 2021     CMP:  Albumin:    Lab Results   Component Value Date    LABALBU 3.6 2021     PT/INR:    Lab Results   Component Value Date    PROTIME 9.9 2021    INR 0.9 2021     HgBA1c:    Lab Results   Component Value Date    LABA1C 10.0 2017     PTT: No components found for: LABPTT      Assessment:       Measurements:     07/09/21 1403   Wound 07/09/21 Elbow Right   Date First Assessed/Time First Assessed: 07/09/21 1305   Present on Hospital Admission: Yes  Primary Wound Type: Pressure Injury  Location: Elbow  Wound Location Orientation: Right   Wound Etiology Pressure Stage  3   Dressing Status New dressing applied   Wound Cleansed Irrigated with saline   Dressing/Treatment Foam   Dressing Change Due 07/12/21   Wound Length (cm) 1 cm   Wound Width (cm) 0.9 cm   Wound Depth (cm) 0.2 cm   Wound Surface Area (cm^2) 0.9 cm^2   Wound Volume (cm^3) 0.18 cm^3   Wound Assessment Subcutaneous;Pink/red   Drainage Amount Scant   Drainage Description Serosanguinous   Odor None   Toña-wound Assessment Intact   Wound 07/09/21 Hip Right;Lateral   Date First Assessed/Time First Assessed: 07/09/21 1305   Present on Hospital Admission: Yes  Primary Wound Type: Pressure Injury  Location: Hip  Wound Location Orientation: Right;Lateral   Wound Etiology Pressure Stage  3   Dressing Status New dressing applied   Wound Cleansed Cleansed with saline   Dressing/Treatment Foam   Dressing Change Due 07/12/21   Wound Length (cm) 1.2 cm   Wound Width (cm) 1.2 cm   Wound Depth (cm) 0.3 cm   Wound Surface Area (cm^2) 1.44 cm^2   Wound Volume (cm^3) 0.432 cm^3   Wound Assessment Subcutaneous;Pink/red;Fibrinous   Drainage Amount Small   Drainage Description Serosanguinous   Odor None   Toña-wound Assessment Intact; Hyperpigmented   Margins Defined edges   Wound 07/09/21 Arm Left;Upper   Date First Assessed/Time First Assessed: 07/09/21 1305   Present on Hospital Admission: Yes  Primary Wound Type: Skin Tear  Location: Arm  Wound Location Orientation: Left;Upper   Wound Etiology Skin Tear   Dressing Status Clean;Dry; Intact   Dressing/Treatment Foam   Dressing Change Due 07/11/21   Wound Length (cm) 1.5 cm   Wound Width (cm) 1.5 cm   Wound Depth (cm) 0.1 cm   Wound Surface Area (cm^2) 2.25 cm^2 Wound Volume (cm^3) 0.225 cm^3   Wound Assessment Superficial;Pink/red   Drainage Amount Scant   Drainage Description Serous   Odor None   Toña-wound Assessment Intact   Wound 07/09/21 Arm Right;Upper   Date First Assessed/Time First Assessed: 07/09/21 1305   Present on Hospital Admission: Yes  Primary Wound Type: Skin Tear  Location: Arm  Wound Location Orientation: Right;Upper   Wound Etiology Skin Tear   Dressing Status Clean;Dry; Intact   Dressing/Treatment Foam   Dressing Change Due 07/11/21   Drainage Description Sanguinous   Odor None   Toña-wound Assessment Intact; Ecchymosis   Wound 07/09/21 Pretibial Right   Date First Assessed/Time First Assessed: 07/09/21 1305   Present on Hospital Admission: Yes  Primary Wound Type: Skin Tear  Location: Pretibial  Wound Location Orientation: Right   Wound Etiology Skin Tear   Dressing Status New dressing applied   Wound Cleansed Irrigated with saline   Dressing/Treatment Foam   Dressing Change Due 07/12/21   Wound Length (cm) 4 cm   Wound Width (cm) 1.5 cm   Wound Depth (cm) 0.1 cm   Wound Surface Area (cm^2) 6 cm^2   Wound Volume (cm^3) 0.6 cm^3   Wound Assessment Superficial;Pink/red   Drainage Amount Small   Drainage Description Serosanguinous   Odor None   Toña-wound Assessment Intact; Ecchymosis      sacrococcygeal area is clear of injury. Mild (pink skin) incontinence associated dermatitis of the immediate perianal skin. Response to treatment:  Well tolerated by patient. Plan:     Plan of Care: Turn every 2 hours  Float heels off of bed with pillows under calves    Foam sacrum dressing to sacrococcygeal area. Peel back dressing, inspect skin beneath, re-secure. Change every 72 hours and prn wrinkles, soilage. Discontinue sacral dressing if repeatedly soiled by incontinence. Routine incontinence care with incontinence barrier cloths and zinc oxide cream. Apply zinc oxide cream BID and prn incontinence.    Moisture wicking under pads    Static air overlay. Check inflation each shift by sliding hand under the air overlay. Feel for the patient's heaviest/ most dependant body part. Ideally 1/2 to 1\" of air will be between your hand and the patient's body. Add air prn. Foam to all wounds. Change every 3 days and prn saturation. Moisturize skin daily. Specialty Bed Required : Yes   [] Low Air Loss   [x] Pressure Redistribution  [] Fluid Immersion  [] Bariatric  [] Total Pressure Relief  [] Other:     Discharge Plan:  Placement for patient upon discharge: home with support   Hospice Care: No   Patient appropriate for Outpatient [address] [place] [address] Road: Yes if wounds persist.     Patient/Caregiver Teaching:    Discussed with patient's son at length:   skin tear prevention- use gentle soap, moisturize the skin at least daily. Long sleeve shirts. Pressure injury care- moist wound healing. Balance the moisture. Remove any yellow fibrinous tissue. Keep covered. We are using a silicone bordered foam currently. Mepilex, Allevyn, Optifoam are brand names. May consider adding collagen dressing under the foam. Puracol, Fibracol are brand names. Offload pressure. Acute skin tears- keep covered with gentle dressings for 2-3 days at a time to allow re-epithelization.    Will update PRACHI with topics covered to refresh his memory for discussions with home care team.      [] Indicates understanding       [] Needs reinforcement  [] Unsuccessful      [x] Verbal Understanding  [] Demonstrated understanding       [] No evidence of learning  [] Refused teaching         [] N/A       Electronically signed by Fabiana Graham RN, [place] on 7/9/2021 at 2:08 PM

## 2021-07-10 LAB
GLUCOSE BLD-MCNC: 115 MG/DL (ref 65–105)
GLUCOSE BLD-MCNC: 150 MG/DL (ref 65–105)
GLUCOSE BLD-MCNC: 221 MG/DL (ref 65–105)
GLUCOSE BLD-MCNC: 226 MG/DL (ref 65–105)
GLUCOSE BLD-MCNC: 89 MG/DL (ref 65–105)
GLUCOSE BLD-MCNC: 91 MG/DL (ref 65–105)

## 2021-07-10 PROCEDURE — 6370000000 HC RX 637 (ALT 250 FOR IP): Performed by: HEALTH CARE PROVIDER

## 2021-07-10 PROCEDURE — 6370000000 HC RX 637 (ALT 250 FOR IP): Performed by: STUDENT IN AN ORGANIZED HEALTH CARE EDUCATION/TRAINING PROGRAM

## 2021-07-10 PROCEDURE — 2580000003 HC RX 258: Performed by: STUDENT IN AN ORGANIZED HEALTH CARE EDUCATION/TRAINING PROGRAM

## 2021-07-10 PROCEDURE — 6360000002 HC RX W HCPCS: Performed by: STUDENT IN AN ORGANIZED HEALTH CARE EDUCATION/TRAINING PROGRAM

## 2021-07-10 PROCEDURE — 2060000000 HC ICU INTERMEDIATE R&B

## 2021-07-10 PROCEDURE — 97110 THERAPEUTIC EXERCISES: CPT

## 2021-07-10 PROCEDURE — 82947 ASSAY GLUCOSE BLOOD QUANT: CPT

## 2021-07-10 PROCEDURE — 97530 THERAPEUTIC ACTIVITIES: CPT

## 2021-07-10 RX ORDER — NITROFURANTOIN 25; 75 MG/1; MG/1
100 CAPSULE ORAL EVERY 12 HOURS SCHEDULED
Qty: 8 CAPSULE | Refills: 0 | Status: SHIPPED | OUTPATIENT
Start: 2021-07-10 | End: 2021-07-14

## 2021-07-10 RX ORDER — LEVETIRACETAM 500 MG/1
500 TABLET ORAL 2 TIMES DAILY
Qty: 12 TABLET | Refills: 0 | Status: SHIPPED | OUTPATIENT
Start: 2021-07-10 | End: 2021-07-16

## 2021-07-10 RX ADMIN — NYSTATIN: 100000 CREAM TOPICAL at 10:15

## 2021-07-10 RX ADMIN — ACETAMINOPHEN 1000 MG: 500 TABLET ORAL at 04:06

## 2021-07-10 RX ADMIN — ENOXAPARIN SODIUM 30 MG: 30 INJECTION SUBCUTANEOUS at 10:03

## 2021-07-10 RX ADMIN — DONEPEZIL HYDROCHLORIDE 10 MG: 10 TABLET, FILM COATED ORAL at 19:53

## 2021-07-10 RX ADMIN — ATORVASTATIN CALCIUM 20 MG: 20 TABLET, FILM COATED ORAL at 10:02

## 2021-07-10 RX ADMIN — INSULIN LISPRO 6 UNITS: 100 INJECTION, SOLUTION INTRAVENOUS; SUBCUTANEOUS at 14:07

## 2021-07-10 RX ADMIN — INSULIN LISPRO 6 UNITS: 100 INJECTION, SOLUTION INTRAVENOUS; SUBCUTANEOUS at 22:05

## 2021-07-10 RX ADMIN — NITROFURANTOIN MONOHYDRATE/MACROCRYSTALLINE 100 MG: 25; 75 CAPSULE ORAL at 22:05

## 2021-07-10 RX ADMIN — LEVETIRACETAM 500 MG: 500 TABLET, FILM COATED ORAL at 19:53

## 2021-07-10 RX ADMIN — SODIUM CHLORIDE, PRESERVATIVE FREE 10 ML: 5 INJECTION INTRAVENOUS at 22:26

## 2021-07-10 RX ADMIN — ENOXAPARIN SODIUM 30 MG: 30 INJECTION SUBCUTANEOUS at 19:54

## 2021-07-10 RX ADMIN — SODIUM CHLORIDE, PRESERVATIVE FREE 10 ML: 5 INJECTION INTRAVENOUS at 10:16

## 2021-07-10 RX ADMIN — LEVETIRACETAM 500 MG: 500 TABLET, FILM COATED ORAL at 10:02

## 2021-07-10 RX ADMIN — INSULIN LISPRO 3 UNITS: 100 INJECTION, SOLUTION INTRAVENOUS; SUBCUTANEOUS at 05:21

## 2021-07-10 RX ADMIN — ACETAMINOPHEN 1000 MG: 500 TABLET ORAL at 12:31

## 2021-07-10 RX ADMIN — POLYETHYLENE GLYCOL 3350 17 G: 17 POWDER, FOR SOLUTION ORAL at 10:02

## 2021-07-10 RX ADMIN — NITROFURANTOIN MONOHYDRATE/MACROCRYSTALLINE 100 MG: 25; 75 CAPSULE ORAL at 10:02

## 2021-07-10 RX ADMIN — ACETAMINOPHEN 1000 MG: 500 TABLET ORAL at 19:53

## 2021-07-10 ASSESSMENT — PAIN SCALES - GENERAL
PAINLEVEL_OUTOF10: 2
PAINLEVEL_OUTOF10: 3
PAINLEVEL_OUTOF10: 4

## 2021-07-10 NOTE — PLAN OF CARE
Problem: Falls - Risk of:  Goal: Will remain free from falls  Description: Will remain free from falls  7/10/2021 0509 by Magali Howe RN  Outcome: Ongoing  Goal: Absence of physical injury  Description: Absence of physical injury  7/10/2021 0509 by Magali Howe RN  Outcome: Ongoing    Pt assessed as a fall risk this shift. Remains free from falls and accidental injury at this time. Fall precautions in place, including falling star sign. Floor free from obstacles, and bed is locked and in lowest position. Adequate lighting provided. Pt encouraged to call before getting Out Of Bed for any need. Will continue to monitor needs during hourly rounding, and reinforce education on use of call light. Problem: Injury - Risk of, Physical Injury:  Goal: Will remain free from falls  Description: Will remain free from falls  7/10/2021 0509 by Magali Howe RN  Outcome: Ongoing  Goal: Absence of physical injury  Description: Absence of physical injury  7/10/2021 0509 by Magali Howe RN  Outcome: Ongoing    Problem: Skin Integrity:  Goal: Will show no infection signs and symptoms  Description: Will show no infection signs and symptoms  7/10/2021 0509 by Magali Howe RN  Outcome: Ongoing  Goal: Absence of new skin breakdown  Description: Absence of new skin breakdown  7/10/2021 0509 by Magali Howe RN  Outcome: Ongoing    Problem: Pain:  Goal: Pain level will decrease  Description: Pain level will decrease  Outcome: Ongoing  Goal: Control of acute pain  Description: Control of acute pain  Outcome: Ongoing    Pain level assessment complete. Pt rated pain at 2/10. Pt educated on pain scale and control interventions. PRN pain medication given per pt request. Pt instructed to call out with new onset of pain or unrelieved pain. Will continue to monitor.

## 2021-07-10 NOTE — CARE COORDINATION
Discharge 1 [facility] Case Management Department  Written by: Ryland Fleischer, RN    Patient Name: Delmi Knox  Attending Provider: Shawn Jones MD  Admit Date: 2021  4:16 PM  MRN: [id number]  Account: [de-identified]                     : 1940  Discharge Date:  07/10/21        Disposition: home with son and [facility]; confirmed with Neo Ott that patient is discharging today. Has transportation. PRACHI and HC order in.      Ryland Fleischer, RN

## 2021-07-10 NOTE — PROGRESS NOTES
Physical Therapy  Facility/Department: [facility] NEURO  Daily Treatment Note  NAME: Harman Cutler  : 1940  MRN: [id number]    Date of Service: 7/10/2021    Discharge Recommendations:  Patient would benefit from continued therapy after discharge   PT Equipment Recommendations  Equipment Needed: No    Assessment   Body structures, Functions, Activity limitations: Decreased functional mobility ; Decreased ADL status; Decreased ROM; Decreased strength;Decreased endurance;Decreased balance;Decreased posture  Assessment: Pt The is overall mod-Min A for funtional mobility, requires constant Tactile/Viusal and VCs for all activities. . Pt could beneift from continued PT in order to improve functional mobility. Pt currently requires 24 hour care due to increased risk of falls due to significant confusion and level of assistance required. Prognosis: Fair  PT Education: Goals; General Safety;PT Role;Plan of Care;Precautions;Transfer Training;Gait Training;Functional Mobility Training  Patient Education: limited by decrease cognition  Barriers to Learning: pt current cognitive status  REQUIRES PT FOLLOW UP: Yes  Activity Tolerance  Activity Tolerance: Patient limited by cognitive status     Patient Diagnosis(es): The primary encounter diagnosis was Subdural hematoma ([address] A diagnosis of SDH (subdural hematoma) (HCC) was also pertinent to this visit. has a past medical history of Dementia ([facility] [address] Diabetes mellitus ([address] Fracture, Hyperlipidemia, and Hypertension. has no past surgical history on file. Restrictions  Restrictions/Precautions  Restrictions/Precautions: Fall Risk  Required Braces or Orthoses?: Yes  Required Braces or Orthoses  Cervical: c-collar  Position Activity Restriction  Other position/activity restrictions: TLS clear, C-collar in place per trauma  Subjective   General  Response To Previous Treatment: Patient with no complaints from previous session.   Family / Caregiver Present: No  Subjective  Subjective: Pt and RN agreeable to PT. Pt in bed upon arrival and exit. Pt pleasantly confuse t/o  Pain Screening  Patient Currently in Pain: Denies  Vital Signs  Patient Currently in Pain: Denies       Orientation  Orientation  Overall Orientation Status: Impaired  Orientation Level: Disoriented to situation;Disoriented to time;Disoriented to place; Disoriented to person (But responds whne called Barbra Gurrola.)  Cognition      Objective   Bed mobility  Supine to Sit: Moderate assistance  Sit to Supine: Minimal assistance  Scooting: Maximal assistance  Comment: Assist with BLE ad traunk progression; Tactile and constant VCs for hand placement and sequencing . Transfers  Sit to Stand: Contact guard assistance  Stand to sit: Contact guard assistance  Comment: max verbal and tactile cues with poor return for initiation and sequencing  Ambulation  Ambulation?: Yes  Ambulation 1  Surface: level tile  Device: Rolling Walker  Assistance: Minimal assistance  Gait Deviations: Slow Mirela;Decreased step length;Decreased step height  Distance: 3ft to HOB; Comments: max verbal and tactile cues for Pt to advance BLE and  RW management. unsafe to attempt any mobility witout assist.  Stairs/Curb  Stairs?: No     Balance  Posture: Fair  Sitting - Static: Good  Sitting - Dynamic: Fair  Standing - Static: Fair  Standing - Dynamic: Fair;-  Comments: standing balance assessed with RW  Exercises  Comments: Seated LE exercise program: Long Arc Quads, hip abduction/adduction, heel/toe raises, and marches. Reps: x10.  Max Tactile/ visual  and VC required t/o Poor return         Goals  Short term goals  Time Frame for Short term goals: 14 visits  Short term goal 1: Ambulate 100ft CGA with RW  Short term goal 2: Perform transfers with supervision  Short term goal 3: Perform bed mobility with SBA  Short term goal 4: Demo Good- dynamic standing balance    Plan    Plan  Times per week: 5-6x/wk  Current Treatment Recommendations: Strengthening, ROM, Balance Training, Functional Mobility Training, Transfer Training, ADL/Self-care Training, Gait Training, Endurance Training, Home Exercise Program, Safety Education & Training, Patient/Caregiver Education & Training, Positioning  Safety Devices  Type of devices:  All fall risk precautions in place, Bed alarm in place, Call light within reach, Gait belt, Patient at risk for falls, Left in bed, Nurse notified  Restraints  Initially in place: No     Therapy Time   Individual Concurrent Group Co-treatment   Time In 1253         Time Out 1321         Minutes 28         Timed Code Treatment Minutes: Minh Mares, PTA

## 2021-07-10 NOTE — DISCHARGE SUMMARY
DISCHARGE SUMMARY:    PATIENT NAME:  Wendy Tovar  YOB: 1940  MEDICAL RECORD NO. [id number]  DATE: 07/10/21  PRIMARY CARE PHYSICIAN: Pb Castillo MD  ADMIT DATE:  7/8/2021    DISCHARGE DATE:  7/11/2021  DISPOSITION: Home with home care  ADMITTING DIAGNOSIS:   Patient Active Problem List   Diagnosis    Alzheimer's disease ([facility] [address]    Hypokalemia    Hypoglycemia    Community acquired pneumonia of left lower lobe of lung    Sepsis due to pneumonia ([facility] [address]    Hypertension    Hyperlipidemia    Diabetes mellitus ([facility] [address]    Hyperglycemia    Hypercalcemia    Pneumonia due to organism    Fall from standing    Closed fracture of superior pubic ramus ([facility] [address]    Head injury    [facility] hemat[facility]) [facility]       CONSULTANTS: Neurosurgery and palliative care    PROCEDURES:   None    HOSPITAL COURSE:   Wendy Tovar is a 68 y.o. female who was admitted on 7/8/2021 after she was found to have an acute subdural hematoma. Patient does take aspirin at home. Patient has dementia and is A&O x0 at her baseline. Patient was admitted to the ICU and neurosurgery was consulted. Neurosurgery evaluated and determined that no surgical interventions were planned for now and will follow up with the patient in the outpatient setting. A repeat head CT determined that the bleed was stable. The patient was transferred out of the ICU and continues to well, had no neurological deficits. The patient lives at home with her son, her POA. Palliative care was consulted to evaluate CODE STATUS for the patient and spoke with the son who determined to have the patient remain a full code for now. The son request to have the patient come home with him so she will be discharged with home care. Labs and imaging were followed daily. On day of discharge Wendy Tovar  was tolerating a regular diet  had adequate analgeia on oral medications  had no signs of complication.   She was deemed medically stable for discharged to Home with [address] [place] [address] [facility]        PHYSICAL EXAMINATION:        Discharge Vitals:  height is 5' 4\" (1.626 m) and weight is 113 lb 8.6 oz (51.5 kg). Her oral temperature is 97.6 °F (36.4 °C). Her blood pressure is 118/65 and her pulse is 72. Her respiration is 15 and oxygen saturation is 98%. Exam on day of discharge:  GENERAL: alert, cooperative, no distress  NEURO: GCS 14, A&O x0 which is baseline  HEENT: nares patent, no discharge, sclera anicteric  LUNGS: equal chest rise and fall, no increased WOB  HEART: normal rate and regular rhythm  ABDOMEN: soft, non-tender, non-distended and no guarding or peritoneal signs present  EXTREMITY: no cyanosis, clubbing or edema  SKIN: skin tears covered in protective dressing, warm, dry    LABS:     Recent Labs     07/08/21  1227 07/08/21  1630 07/09/21  0331   WBC 7.7 7.7 5.0   HGB 13.9 13.7 12.1   HCT 44.0 43.9 40.0    238 238    141 140   K 4.2 4.1 3.4*    106 108*   CO2 20 24 21   BUN 22 18 12   CREATININE 0.90 0.57 0.52       DIAGNOSTIC TESTS:    XR SHOULDER RIGHT (MIN 2 VIEWS)    Result Date: 7/8/2021  EXAMINATION: ONE XRAY VIEW OF THE CHEST; THREE XRAY VIEWS OF THE RIGHT SHOULDER 7/8/2021 4:53 pm COMPARISON: Chest of 12 May 2021 HISTORY: ORDERING SYSTEM PROVIDED HISTORY: trauma TECHNOLOGIST PROVIDED HISTORY: trauma Reason for Exam: supine Acuity: Acute Type of Exam: Initial FINDINGS: Chest: The patient has a comminuted fracture of the proximal left humerus involving the greater tuberosity. AP portable view of the chest time stamped at 1649 hours demonstrates overlying cardiac monitoring electrodes. Heart is stable. Aorta is calcified and ectatic. Stable granuloma is present the left lower lung field. No consolidation, vascular congestion, effusion or pneumothorax is noted. Right shoulder: Osteopenia is noted. The humeral head is well circumscribed and situated within the glenoid fossa. Moderate glenohumeral degenerative changes are present.   Increased acromiohumeral distance is noted suggesting effusion. No acute fracture or dislocation is noted. AC relationship is preserved. Chest: No acute cardiopulmonary process. However, there is a lucency through the greater tuberosity of the humerus suspicious for nondisplaced fracture. Correlate clinically. Right shoulder: Osteopenia. Advanced arthritic change. No acute fracture or dislocation. Suspected effusion. XR HIP RIGHT (1 VIEW)    Result Date: 7/8/2021  EXAMINATION: ONE XRAY VIEW OF THE RIGHT HIP 7/8/2021 10:53 am COMPARISON: May 12, 2021 HISTORY: ORDERING SYSTEM PROVIDED HISTORY: trauma TECHNOLOGIST PROVIDED HISTORY: trauma Reason for Exam: fall, difficulty following commands, dementia Acuity: Acute Type of Exam: Initial FINDINGS: Femoral head appears normally articulated at the hip joint on this single AP view. Femoral neck and intertrochanteric region are intact. Pubic rami are intact. Soft tissues appear normal.  Degenerative changes present involving the symphysis pubis. No evidence of an acute fracture or traumatic malalignment involving the right hip. CT HEAD WO CONTRAST    Result Date: 7/8/2021  EXAMINATION: CT OF THE HEAD WITHOUT CONTRAST  7/8/2021 8:02 pm TECHNIQUE: CT of the head was performed without the administration of intravenous contrast. Dose modulation, iterative reconstruction, and/or weight based adjustment of the mA/kV was utilized to reduce the radiation dose to as low as reasonably achievable. COMPARISON: Same-day head CT HISTORY: ORDERING SYSTEM PROVIDED HISTORY: f/u SDH TECHNOLOGIST PROVIDED HISTORY: f/u SDH Reason for Exam: f/u SDH FINDINGS: There has been no interval change in 8 mm  right frontal and right temporal acute subdural hematoma. There is mild mass effect on adjacent brain sulci. No significant midline shift. The unchanged encephalomalacia changes of left hemisphere including left frontal and parietal lobes. No acute infarction or intracranial mass.  There are mild nonspecific foci of periventricular and subcortical cerebral white matter hypodensity, most likely representing chronic microangiopathic disease in this age group. The brain parenchyma is otherwise normal. The cerebellar tonsils are in normal position. The ventricles, sulci, and cisterns are mildly prominent suggestive of mild generalized volume loss. The globes and orbits are within normal limits. The visualized extracranial structures including paranasal sinuses and mastoid air cells are unremarkable. No fracture is identified. Stable study. Unchanged acute right frontotemporal subdural hematoma with no midline shift. Unchanged encephalomalacia of left hemisphere. .     CT HEAD WO CONTRAST    Result Date: 7/8/2021  EXAMINATION: CT OF THE HEAD WITHOUT CONTRAST  7/8/2021 12:59 pm TECHNIQUE: CT of the head was performed without the administration of intravenous contrast. Dose modulation, iterative reconstruction, and/or weight based adjustment of the mA/kV was utilized to reduce the radiation dose to as low as reasonably achievable. COMPARISON: None. HISTORY: ORDERING SYSTEM PROVIDED HISTORY: Syncope, weakness TECHNOLOGIST PROVIDED HISTORY: Syncope, weakness Decision Support Exception - unselect if not a suspected or confirmed emergency medical condition->Emergency Medical Condition (MA) Reason for Exam: Syncope, weakness. Hx of dementia Acuity: Acute Type of Exam: Initial FINDINGS: BRAIN/VENTRICLES: There is no evidence for mass effect or midline shift. There is an acute subdural hematoma in the right frontal region which measures 8 mm in thickness. There is also subdural hematoma just anterior to the right temporal lobe in the middle cranial fossa and laterally. This measures 8 mm in thickness. Laterally, this measures 5 mm in thickness. The previously seen bilateral low-density extra-axial fluid collections have resolved. The gray-white differentiation is maintained without evidence of an acute infarct. There is prominence of the ventricles and sulci due to global parenchymal volume loss. There are nonspecific areas of hypoattenuation within the periventricular and subcortical white matter, which likely represent chronic microvascular ischemic change. ORBITS: The visualized portion of the orbits demonstrate no acute abnormality. SINUSES: The visualized paranasal sinuses and mastoid air cells demonstrate no acute abnormality. SOFT TISSUES/SKULL: No acute abnormality of the visualized skull or soft tissues. New acute subdural hematomas in the right frontal lobe and anterior to the right temporal lobe. The findings were discussed with Dr. Yuniel Barreto on 07/08/2021 at 1:27 p.m. [facility] CT CERVICAL SPINE WO CONTRAST    Result Date: 7/8/2021  EXAMINATION: CT OF THE CERVICAL SPINE WITHOUT CONTRAST 7/8/2021 10:54 am TECHNIQUE: CT of the cervical spine was performed without the administration of intravenous contrast. Multiplanar reformatted images are provided for review. Dose modulation, iterative reconstruction, and/or weight based adjustment of the mA/kV was utilized to reduce the radiation dose to as low as reasonably achievable. COMPARISON: May 12, 2021 HISTORY: ORDERING SYSTEM PROVIDED HISTORY: trauma TECHNOLOGIST PROVIDED HISTORY: trauma Decision Support Exception - unselect if not a suspected or confirmed emergency medical condition->Emergency Medical Condition (MA) Reason for Exam: FALL Acuity: Unknown Type of Exam: Unknown FINDINGS: BONES/ALIGNMENT: There is no acute fracture or traumatic malalignment. DEGENERATIVE CHANGES: Multilevel degenerative changes are again visualized, most prominent at the C5-6 disc level. SOFT TISSUES: There is no prevertebral soft tissue swelling. No acute abnormality of the cervical spine.      XR CHEST PORTABLE    Result Date: 7/8/2021  EXAMINATION: ONE XRAY VIEW OF THE CHEST; THREE XRAY VIEWS OF THE RIGHT SHOULDER 7/8/2021 4:53 pm COMPARISON: Chest of 12 May 2021 HISTORY: ORDERING SYSTEM PROVIDED HISTORY: trauma TECHNOLOGIST PROVIDED HISTORY: trauma Reason for Exam: supine Acuity: Acute Type of Exam: Initial FINDINGS: Chest: The patient has a comminuted fracture of the proximal left humerus involving the greater tuberosity. AP portable view of the chest time stamped at 1649 hours demonstrates overlying cardiac monitoring electrodes. Heart is stable. Aorta is calcified and ectatic. Stable granuloma is present the left lower lung field. No consolidation, vascular congestion, effusion or pneumothorax is noted. Right shoulder: Osteopenia is noted. The humeral head is well circumscribed and situated within the glenoid fossa. Moderate glenohumeral degenerative changes are present. Increased acromiohumeral distance is noted suggesting effusion. No acute fracture or dislocation is noted. AC relationship is preserved. Chest: No acute cardiopulmonary process. However, there is a lucency through the greater tuberosity of the humerus suspicious for nondisplaced fracture. Correlate clinically. Right shoulder: Osteopenia. Advanced arthritic change. No acute fracture or dislocation. Suspected effusion. CT CHEST ABDOMEN PELVIS W CONTRAST    Result Date: 7/8/2021  EXAMINATION: CT OF THE CHEST, ABDOMEN, AND PELVIS WITH CONTRAST; CT OF THE THORACIC SPINE WITHOUT CONTRAST; CT OF THE LUMBAR SPINE WITHOUT CONTRAST 7/8/2021 3:55 pm TECHNIQUE: CT of the chest, abdomen and pelvis was performed with the administration of intravenous contrast. Multiplanar reformatted images are provided for review. Dose modulation, iterative reconstruction, and/or weight based adjustment of the mA/kV was utilized to reduce the radiation dose to as low as reasonably achievable.; CT of the thoracic spine was performed without the administration of intravenous contrast. Multiplanar reformatted images are provided for review.  Dose modulation, iterative reconstruction, and/or weight based adjustment of the mA/kV was utilized to reduce the radiation dose to as low as reasonably achievable.; CT of the lumbar spine was performed without the administration of intravenous contrast. Multiplanar reformatted images are provided for review. Dose modulation, iterative reconstruction, and/or weight based adjustment of the mA/kV was utilized to reduce the radiation dose to as low as reasonably achievable. COMPARISON: CT study from May 12, 2021. HISTORY: ORDERING SYSTEM PROVIDED HISTORY: trauma TECHNOLOGIST PROVIDED HISTORY: trauma Decision Support Exception - unselect if not a suspected or confirmed emergency medical condition->Emergency Medical Condition (MA) Reason for Exam: trauma Acuity: Unknown Type of Exam: Unknown; ORDERING SYSTEM PROVIDED HISTORY: trauma TECHNOLOGIST PROVIDED HISTORY: trauma Reason for Exam: trauma Acuity: Unknown Type of Exam: Unknown FINDINGS: Chest: Mediastinum: The heart size is normal. No pericardial effusion. The thoracic aorta and proximal great vessels are patent and normal in caliber. No acute aortic abnormality, intramural hematoma, or mediastinal hematoma. The central pulmonary arteries are patent and free of embolism. No enlarged or suspicious axillary, hilar, or mediastinal lymphadenopathy. Lungs/pleura: The trachea and mainstem bronchi are widely patent. There is mild centrilobular emphysema. The lungs are grossly clear. No discrete noncalcified pulmonary nodule or mass. No pleural effusion or pneumothorax. Soft Tissues/Bones: No chest wall hematoma. No acute fracture. Thoracic Spine: BONES/ALIGNMENT:   Bone mineralization is normal.  There is a healing nondisplaced fracture of the T12 vertebral body superior endplate without retropulsion or loss of height. The vertebral bodies and posterior elements appear intact and appropriately aligned without acute fracture or subluxation. Vertebral body stature is maintained throughout as is the normal thoracic kyphosis.  DEGENERATIVE CHANGES: ala noted. Degenerative changes of the thoracic and lumbar spine. No acute fracture. CT LUMBAR SPINE TRAUMA RECONSTRUCTION    Result Date: 7/8/2021  EXAMINATION: CT OF THE CHEST, ABDOMEN, AND PELVIS WITH CONTRAST; CT OF THE THORACIC SPINE WITHOUT CONTRAST; CT OF THE LUMBAR SPINE WITHOUT CONTRAST 7/8/2021 3:55 pm TECHNIQUE: CT of the chest, abdomen and pelvis was performed with the administration of intravenous contrast. Multiplanar reformatted images are provided for review. Dose modulation, iterative reconstruction, and/or weight based adjustment of the mA/kV was utilized to reduce the radiation dose to as low as reasonably achievable.; CT of the thoracic spine was performed without the administration of intravenous contrast. Multiplanar reformatted images are provided for review. Dose modulation, iterative reconstruction, and/or weight based adjustment of the mA/kV was utilized to reduce the radiation dose to as low as reasonably achievable.; CT of the lumbar spine was performed without the administration of intravenous contrast. Multiplanar reformatted images are provided for review. Dose modulation, iterative reconstruction, and/or weight based adjustment of the mA/kV was utilized to reduce the radiation dose to as low as reasonably achievable. COMPARISON: CT study from May 12, 2021. HISTORY: ORDERING SYSTEM PROVIDED HISTORY: trauma TECHNOLOGIST PROVIDED HISTORY: trauma Decision Support Exception - unselect if not a suspected or confirmed emergency medical condition->Emergency Medical Condition (MA) Reason for Exam: trauma Acuity: Unknown Type of Exam: Unknown; ORDERING SYSTEM PROVIDED HISTORY: trauma TECHNOLOGIST PROVIDED HISTORY: trauma Reason for Exam: trauma Acuity: Unknown Type of Exam: Unknown FINDINGS: Chest: Mediastinum: The heart size is normal. No pericardial effusion. The thoracic aorta and proximal great vessels are patent and normal in caliber.   No acute aortic abnormality, intramural hematoma, or mediastinal hematoma. The central pulmonary arteries are patent and free of embolism. No enlarged or suspicious axillary, hilar, or mediastinal lymphadenopathy. Lungs/pleura: The trachea and mainstem bronchi are widely patent. There is mild centrilobular emphysema. The lungs are grossly clear. No discrete noncalcified pulmonary nodule or mass. No pleural effusion or pneumothorax. Soft Tissues/Bones: No chest wall hematoma. No acute fracture. Thoracic Spine: BONES/ALIGNMENT:   Bone mineralization is normal.  There is a healing nondisplaced fracture of the T12 vertebral body superior endplate without retropulsion or loss of height. The vertebral bodies and posterior elements appear intact and appropriately aligned without acute fracture or subluxation. Vertebral body stature is maintained throughout as is the normal thoracic kyphosis. DEGENERATIVE CHANGES: There is mild multilevel thoracic degenerative disc disease. No significant bony neural foraminal narrowing. SOFT TISSUES: No paraspinal soft tissue abnormality. Abdomen/Pelvis: Organs: The liver, spleen, pancreas, kidneys, gallbladder, and adrenal glands appear normal. GI/Bowel: The stomach and the small and large bowel loops are normal in caliber, contour, and morphology, without acute or significant abnormality. No dilated loops or areas of bowel wall thickening. Peritoneum/Retroperitoneum: There is no free fluid or extraluminal gas. No mesenteric or retroperitoneal hematoma. No enlarged or suspicious mesenteric or retroperitoneal lymphadenopathy. The abdominal aorta and iliac arteries are patent and of normal caliber. Pelvis: No pelvic free fluid or enlarged or suspicious pelvic or inguinal lymphadenopathy. No appreciable uterine or adnexal abnormality. The urinary bladder is relatively decompressed with a Patton catheter in place. The pelvic bowel loops are unremarkable. Bones/Soft Tissues: No body wall hematoma.   Healed nondisplaced subacute fractures of the left pubic rami and of the left sacral ala. No acute fracture. Lumbar Spine: BONES/ALIGNMENT:   Bone mineralization is normal.  The vertebral bodies and posterior elements appear intact and appropriately aligned without acute fracture or subluxation. Vertebral body stature is maintained throughout as is the normal lumbar lordosis. DEGENERATIVE CHANGES: There is mild multilevel lumbar degenerative disc disease and facet hypertrophic change. A moderate degree of spinal stenosis and left-sided bony neural foraminal narrowing is present at L5-S1. No additional significant bony neural foraminal narrowing. SOFT TISSUES: No paraspinal soft tissue abnormality. There are no acute traumatic findings within of the chest, abdomen, or pelvis. Healing nondisplaced subacute fractures of the T12 superior endplate, of the left pubic rami, and of the left sacral ala noted. Degenerative changes of the thoracic and lumbar spine. No acute fracture. CT THORACIC SPINE TRAUMA RECONSTRUCTION    Result Date: 7/8/2021  EXAMINATION: CT OF THE CHEST, ABDOMEN, AND PELVIS WITH CONTRAST; CT OF THE THORACIC SPINE WITHOUT CONTRAST; CT OF THE LUMBAR SPINE WITHOUT CONTRAST 7/8/2021 3:55 pm TECHNIQUE: CT of the chest, abdomen and pelvis was performed with the administration of intravenous contrast. Multiplanar reformatted images are provided for review. Dose modulation, iterative reconstruction, and/or weight based adjustment of the mA/kV was utilized to reduce the radiation dose to as low as reasonably achievable.; CT of the thoracic spine was performed without the administration of intravenous contrast. Multiplanar reformatted images are provided for review.  Dose modulation, iterative reconstruction, and/or weight based adjustment of the mA/kV was utilized to reduce the radiation dose to as low as reasonably achievable.; CT of the lumbar spine was performed without the administration of intravenous contrast. Multiplanar reformatted images are provided for review. Dose modulation, iterative reconstruction, and/or weight based adjustment of the mA/kV was utilized to reduce the radiation dose to as low as reasonably achievable. COMPARISON: CT study from May 12, 2021. HISTORY: ORDERING SYSTEM PROVIDED HISTORY: trauma TECHNOLOGIST PROVIDED HISTORY: trauma Decision Support Exception - unselect if not a suspected or confirmed emergency medical condition->Emergency Medical Condition (MA) Reason for Exam: trauma Acuity: Unknown Type of Exam: Unknown; ORDERING SYSTEM PROVIDED HISTORY: trauma TECHNOLOGIST PROVIDED HISTORY: trauma Reason for Exam: trauma Acuity: Unknown Type of Exam: Unknown FINDINGS: Chest: Mediastinum: The heart size is normal. No pericardial effusion. The thoracic aorta and proximal great vessels are patent and normal in caliber. No acute aortic abnormality, intramural hematoma, or mediastinal hematoma. The central pulmonary arteries are patent and free of embolism. No enlarged or suspicious axillary, hilar, or mediastinal lymphadenopathy. Lungs/pleura: The trachea and mainstem bronchi are widely patent. There is mild centrilobular emphysema. The lungs are grossly clear. No discrete noncalcified pulmonary nodule or mass. No pleural effusion or pneumothorax. Soft Tissues/Bones: No chest wall hematoma. No acute fracture. Thoracic Spine: BONES/ALIGNMENT:   Bone mineralization is normal.  There is a healing nondisplaced fracture of the T12 vertebral body superior endplate without retropulsion or loss of height. The vertebral bodies and posterior elements appear intact and appropriately aligned without acute fracture or subluxation. Vertebral body stature is maintained throughout as is the normal thoracic kyphosis. DEGENERATIVE CHANGES: There is mild multilevel thoracic degenerative disc disease. No significant bony neural foraminal narrowing. SOFT TISSUES: No paraspinal soft tissue abnormality. Abdomen/Pelvis: Organs: The liver, spleen, pancreas, kidneys, gallbladder, and adrenal glands appear normal. GI/Bowel: The stomach and the small and large bowel loops are normal in caliber, contour, and morphology, without acute or significant abnormality. No dilated loops or areas of bowel wall thickening. Peritoneum/Retroperitoneum: There is no free fluid or extraluminal gas. No mesenteric or retroperitoneal hematoma. No enlarged or suspicious mesenteric or retroperitoneal lymphadenopathy. The abdominal aorta and iliac arteries are patent and of normal caliber. Pelvis: No pelvic free fluid or enlarged or suspicious pelvic or inguinal lymphadenopathy. No appreciable uterine or adnexal abnormality. The urinary bladder is relatively decompressed with a Patton catheter in place. The pelvic bowel loops are unremarkable. Bones/Soft Tissues: No body wall hematoma. Healed nondisplaced subacute fractures of the left pubic rami and of the left sacral ala. No acute fracture. Lumbar Spine: BONES/ALIGNMENT:   Bone mineralization is normal.  The vertebral bodies and posterior elements appear intact and appropriately aligned without acute fracture or subluxation. Vertebral body stature is maintained throughout as is the normal lumbar lordosis. DEGENERATIVE CHANGES: There is mild multilevel lumbar degenerative disc disease and facet hypertrophic change. A moderate degree of spinal stenosis and left-sided bony neural foraminal narrowing is present at L5-S1. No additional significant bony neural foraminal narrowing. SOFT TISSUES: No paraspinal soft tissue abnormality. There are no acute traumatic findings within of the chest, abdomen, or pelvis. Healing nondisplaced subacute fractures of the T12 superior endplate, of the left pubic rami, and of the left sacral ala noted. Degenerative changes of the thoracic and lumbar spine. No acute fracture.        DISCHARGE INSTRUCTIONS     Discharge Medications:        Medication repeat CT head        SIGNED:  Stephane Garzon DO   7/10/2021, 12:16 PM  Time Spent for discharge: 35 minutes        Attending Note    Son plans to tke mom home  I have reviewed the above TECSS note(s) and I either performed the key elements of the medical history and physical exam or was present with the resident when the key elements of the medical history and physical exam were performed. I have discussed the findings, established the care plan and recommendations with Resident, TECSS RN, bedside nurse.     [place] Matamoros MD  7/10/2021  1:40 PM

## 2021-07-10 NOTE — PROGRESS NOTES
Trauma Tertiary Survey    Admit Date: 7/8/2021  Hospital day 2    [facility]       Past Medical History:   Diagnosis Date    Dementia ([address]     Diabetes mellitus ([address]     Fracture     proximal lt humerus. no surgery. 10/2018    Hyperlipidemia     Hypertension        Scheduled Meds:   enoxaparin  30 mg Subcutaneous BID    nitrofurantoin (macrocrystal-monohydrate)  100 mg Oral 2 times per day    levETIRAcetam  500 mg Oral BID    acetaminophen  1,000 mg Oral Q8H    atorvastatin  20 mg Oral Daily    donepezil  10 mg Oral Nightly    insulin lispro  0-18 Units Subcutaneous Q4H    sodium chloride flush  5-40 mL Intravenous 2 times per day    polyethylene glycol  17 g Oral Daily    nystatin   Topical BID     Continuous Infusions:  PRN Meds:sodium chloride flush    Subjective:     Patient has no complaints. .      Objective:     Patient Vitals for the past 8 hrs:   BP Temp Temp src Pulse Resp   07/10/21 1149 118/65 97.6 °F (36.4 °C) Oral  15   07/10/21 0727 (!) 145/60 97.9 °F (36.6 °C) Oral 72 16       I/O last 3 completed shifts: In: 1120 [P.O.:240; I.V.:880]  Out: 1055 [Urine:1055]  I/O this shift:  In: 25 [P.O.:25]  Out: -     Radiology:  CT HEAD WO CONTRAST   Final Result      Stable study. Unchanged acute right frontotemporal subdural hematoma with no midline shift. Unchanged encephalomalacia of left hemisphere. .         CT THORACIC SPINE TRAUMA RECONSTRUCTION   Final Result   There are no acute traumatic findings within of the chest, abdomen, or pelvis. Healing nondisplaced subacute fractures of the T12 superior endplate, of the   left pubic rami, and of the left sacral ala noted. Degenerative changes of   the thoracic and lumbar spine. No acute fracture. CT LUMBAR SPINE TRAUMA RECONSTRUCTION   Final Result   There are no acute traumatic findings within of the chest, abdomen, or pelvis.       Healing nondisplaced subacute fractures of the T12 superior endplate, of the   left pubic rami, and of the left sacral ala noted. Degenerative changes of   the thoracic and lumbar spine. No acute fracture. CT CHEST ABDOMEN PELVIS W CONTRAST   Final Result   There are no acute traumatic findings within of the chest, abdomen, or pelvis. Healing nondisplaced subacute fractures of the T12 superior endplate, of the   left pubic rami, and of the left sacral ala noted. Degenerative changes of   the thoracic and lumbar spine. No acute fracture. CT CERVICAL SPINE WO CONTRAST   Final Result   No acute abnormality of the cervical spine. XR CHEST PORTABLE   Final Result   Chest: No acute cardiopulmonary process. However, there is a lucency through   the greater tuberosity of the humerus suspicious for nondisplaced fracture. Correlate clinically. Right shoulder: Osteopenia. Advanced arthritic change. No acute fracture or   dislocation. Suspected effusion. XR SHOULDER RIGHT (MIN 2 VIEWS)   Final Result   Chest: No acute cardiopulmonary process. However, there is a lucency through   the greater tuberosity of the humerus suspicious for nondisplaced fracture. Correlate clinically. Right shoulder: Osteopenia. Advanced arthritic change. No acute fracture or   dislocation. Suspected effusion. XR HIP RIGHT (1 VIEW)   Final Result   No evidence of an acute fracture or traumatic malalignment involving the   right hip.          CT HEAD WO CONTRAST    (Results Pending)         PHYSICAL EXAM:   GCS:  4 - Opens eyes on own   6 - Follows simple motor commands  5 - Alert and oriented    Pupil size:  Left 3 mm Right 3 mm  Pupil reaction: Yes  Wiggles fingers: Left Yes Right Yes  Hand grasp:   Left normal   Right normal  Wiggles toes: Left Yes    Right Yes  Plantar flexion: Left normal  Right normal    /65   Pulse 72   Temp 97.6 °F (36.4 °C) (Oral)   Resp 15   Ht 5' 4\" (1.626 m)   Wt 113 lb 8.6 oz (51.5 kg)   SpO2 98%   BMI 19.49 kg/m²   General appearance: alert, appears stated age and cooperative  Head: Normocephalic, without obvious abnormality, atraumatic  Eyes: conjunctivae/corneas clear. PERRL, EOM's intact. Fundi benign. Nose: Nares normal. Septum midline. Mucosa normal. No drainage or sinus tenderness.   Throat: lips, mucosa, and tongue normal; teeth and gums normal  Neck: no JVD and supple, symmetrical, trachea midline  Lungs: No respiratory distress, no accessory muscle use  Heart: regular rate and rhythm  Abdomen: soft, non-tender; bowel sounds normal; no masses,  no organomegaly  Extremities: extremities normal, atraumatic, no cyanosis or edema  Pulses: 2+ and symmetric  Skin: Multiple skin tears along all 4 extremities  Neurologic: A & O x0, at baseline    Spine:     Spine Tenderness ROM   Cervical 0 /10 Normal   Thoracic 0 /10 Normal   Lumbar 0 /10 Normal     Musculoskeletal    Joint Tenderness Swelling ROM   Right shoulder absent absent normal   Left shoulder absent absent normal   Right elbow absent absent normal   Left elbow absent absent normal   Right wrist absent absent normal   Left wrist absent absent normal   Right hand grasp absent absent normal   Left hand grasp absent absent normal   Right hip absent absent normal   Left hip absent absent normal   Right knee absent absent normal   Left knee absent absent normal   Right ankle absent absent normal   Left ankle absent absent normal   Right foot absent absent normal   Left foot absent absent normal           CONSULTS: Neurosurgery, palliative care    PROCEDURES: None    INJURIES:        Patient Active Problem List   Diagnosis    Alzheimer's disease ([address]    Hypokalemia    Hypoglycemia    Community acquired pneumonia of left lower lobe of lung    Sepsis due to pneumonia ([address]    Hypertension    Hyperlipidemia    Diabetes mellitus ([address]    Hyperglycemia    Hypercalcemia    Pneumonia due to organism    Fall from standing    Closed fracture of superior pubic ramus ([address]    Head injury    SDH [facility]subdural hematoma) ([facility])         Assessment/Plan:     NEUROLOGIC:  PROBLEMS:  1. Right frontotemporal subdural hematoma on aspirin  2. GCS 14baseline dementia  PLAN:  1. Keppra 500 mg twice daily for 7 days, CT had stable, follow-up neurosurgery outpatient    CARDIOVASCULAR:  PLAN:  1. Monitor    GI/NUTRITION:  PLAN:  1. Regular diet    ID:  PROBLEMS:  3. UTI, Patton discontinued  PLAN:  2. Macrobid for 5 days    ENDOCRINE:  PROBLEMS:  1. Diabetes  PLAN:  1. monitor blood glucose  2.  insulin therapy -  continue    OTHER:  Multiple skin tearswound care    PROPHYLAXIS:   Stress ulcer: None   VTE: lovenox    DISPOSITION:   discharge home with home care

## 2021-07-10 NOTE — PROGRESS NOTES
CLINICAL PHARMACY NOTE: MEDS TO BEDS    Total # of Prescriptions Filled: 2   The following medications were delivered to the patient:  · keppra  · macrobid    Additional Documentation:

## 2021-07-10 NOTE — PROGRESS NOTES
PROGRESS NOTE          PATIENT NAME: Whitney Gao RECORD NO. [id number]  DATE: 7/10/2021  SURGEON: Kanwal Gay  PRIMARY CARE PHYSICIAN: Aayush Villareal MD    HD: # 2    ASSESSMENT    Patient Active Problem List   Diagnosis    Alzheimer's disease [facility]    Hypokalemia    Hypoglycemia    Community acquired pneumonia of left lower lobe of lung    Sepsis due to pneumonia ([facility] [address]    Hypertension    Hyperlipidemia    Diabetes mellitus ([facility] [address]    Hyperglycemia    Hypercalcemia    Pneumonia due to organism    Fall from standing    Closed fracture of superior pubic ramus ([facility] [address]    Head injury    SDH (subdural hematoma) [facility])       MEDICAL DECISION MAKING AND PLAN    Neuro: Fall w/ R frontotemporal SDH 9mm on ASA 324mg for hx CVA  GCS-14 baseline dementia, A+OX0  CTLS- Clear  MMPT  Keppra: 500mg BID x7d  NS recs: CT head stable, hold ASA and can f/u in 2wks  CV  Continue to monitor, no acute needs  Pulm  O2 to maintain >88%, poor IS coordination  GI/Nutrition  Check bedside swallow  Diet: regular soft as tolerated  Bowel regimen: hima, colace  Renal/lytes/fluids  D/c bolanos  UTI on arrival, start 5d macrobid  Heme  DVT prophylaxis-Lovenox twice daily, okay per neurosurgery  Hgb: 12.1  Coags: 0.9  Endocrine  Gluc: 130s  SSI: HISS  Musculoskeletal  Old fxs in T spine and pelvis, no acute fractures  PT/OT: f/u recs  Skin  Multiple skin tears and early pressure wounds, wound/ostomy consulted for recommendations  ID/Micro  Tmax: 37.4  WBC: 5  Abx: Macrobid 5d  Family/dispo  Lives with son, per palliative care patient would like to go home with home care  Discuss code status, palliative consulted will remain full code as of now  Lines  PIV  Bolanos -removed yesterday    Chief Complaint: \" Fall at home\"    SUBJECTIVE    Mara Hyde is seen and examined at the bedside this morning. Patient has dementia and is at her baseline. She has no complaints today. Tolerating a diet.   Working on a safe discharge plan, patient and family prefer to go home with home care. Palliative care consulted and discussed CODE STATUS with family. As of now patient is full code. OBJECTIVE  VITALS: Temp: Temp: 97.9 °F (36.6 °C)Temp  Av.9 °F (36.6 °C)  Min: 97.7 °F (36.5 °C)  Max: 98.1 °F (51.1 °C) BP Systolic (00LAF), UJB:088 , Min:125 , BGZ:915   Diastolic (21ACW), JKI:14, Min:60, Max:110   Pulse Pulse  Av.8  Min: 60  Max: 88 Resp Resp  Av.1  Min: 13  Max: 22 Pulse ox SpO2  Av.6 %  Min: 95 %  Max: 100 %  GENERAL: alert, cooperative, no distress  NEURO: GCS 14, A&O x0 which is baseline  HEENT: nares patent, no discharge, sclera anicteric  LUNGS: equal chest rise and fall, no increased WOB  HEART: normal rate and regular rhythm  ABDOMEN: soft, non-tender, non-distended and no guarding or peritoneal signs present  EXTREMITY: no cyanosis, clubbing or edema  SKIN: skin tears covered in protective dressing, warm, dry  I/O last 3 completed shifts: In: 1120 [P.O.:240; I.V.:880]  Out: 1055 [Urine:1055]    Drain/tube output: In: -   Out: 400 [Urine:400]    LAB:  CBC:   Recent Labs     21  1227 21  1630 21  0331   WBC 7.7 7.7 5.0   HGB 13.9 13.7 12.1   HCT 44.0 43.9 40.0   MCV 90.2 90.0 94.1    238 238     BMP:   Recent Labs     21  1227 21  1630 21  0331    141 140   K 4.2 4.1 3.4*    106 108*   CO2 20 24 21   BUN 22 18 12   CREATININE 0.90 0.57 0.52   GLUCOSE 326* 209* 124*     COAGS:   Recent Labs     21  1630   APTT 21.9   INR 0.9       RADIOLOGY:  CT head from 2021:      Impression       Stable study. Unchanged acute right frontotemporal subdural hematoma with no midline shift. Unchanged encephalomalacia of left hemisphere. Eric Amador,   7/10/21, 8:26 AM         Attending Note      I have reviewed the above TECSS note(s) and I either performed the key elements of the medical history and physical exam or was present with the resident when the key elements of the medical history and physical exam were performed. I have discussed the findings, established the care plan and recommendations with Resident, MAGI RN, bedside nurse.     Leslie Leger MD  7/10/2021  1:41 PM

## 2021-07-11 VITALS
RESPIRATION RATE: 18 BRPM | HEIGHT: 64 IN | OXYGEN SATURATION: 97 % | BODY MASS INDEX: 19.38 KG/M2 | TEMPERATURE: 97.2 F | SYSTOLIC BLOOD PRESSURE: 142 MMHG | WEIGHT: 113.54 LBS | HEART RATE: 66 BPM | DIASTOLIC BLOOD PRESSURE: 82 MMHG

## 2021-07-11 LAB
GLUCOSE BLD-MCNC: 107 MG/DL (ref 65–105)
GLUCOSE BLD-MCNC: 123 MG/DL (ref 65–105)
GLUCOSE BLD-MCNC: 142 MG/DL (ref 65–105)

## 2021-07-11 PROCEDURE — 82947 ASSAY GLUCOSE BLOOD QUANT: CPT

## 2021-07-11 PROCEDURE — 2580000003 HC RX 258: Performed by: STUDENT IN AN ORGANIZED HEALTH CARE EDUCATION/TRAINING PROGRAM

## 2021-07-11 PROCEDURE — 6370000000 HC RX 637 (ALT 250 FOR IP): Performed by: HEALTH CARE PROVIDER

## 2021-07-11 PROCEDURE — 6360000002 HC RX W HCPCS: Performed by: STUDENT IN AN ORGANIZED HEALTH CARE EDUCATION/TRAINING PROGRAM

## 2021-07-11 PROCEDURE — 6370000000 HC RX 637 (ALT 250 FOR IP): Performed by: STUDENT IN AN ORGANIZED HEALTH CARE EDUCATION/TRAINING PROGRAM

## 2021-07-11 RX ADMIN — NYSTATIN: 100000 CREAM TOPICAL at 09:09

## 2021-07-11 RX ADMIN — LEVETIRACETAM 500 MG: 500 TABLET, FILM COATED ORAL at 09:09

## 2021-07-11 RX ADMIN — INSULIN LISPRO 3 UNITS: 100 INJECTION, SOLUTION INTRAVENOUS; SUBCUTANEOUS at 09:20

## 2021-07-11 RX ADMIN — ENOXAPARIN SODIUM 30 MG: 30 INJECTION SUBCUTANEOUS at 09:09

## 2021-07-11 RX ADMIN — ATORVASTATIN CALCIUM 20 MG: 20 TABLET, FILM COATED ORAL at 09:09

## 2021-07-11 RX ADMIN — ACETAMINOPHEN 1000 MG: 500 TABLET ORAL at 11:53

## 2021-07-11 RX ADMIN — NITROFURANTOIN MONOHYDRATE/MACROCRYSTALLINE 100 MG: 25; 75 CAPSULE ORAL at 09:09

## 2021-07-11 RX ADMIN — SODIUM CHLORIDE, PRESERVATIVE FREE 10 ML: 5 INJECTION INTRAVENOUS at 09:10

## 2021-07-11 RX ADMIN — POLYETHYLENE GLYCOL 3350 17 G: 17 POWDER, FOR SOLUTION ORAL at 09:09

## 2021-07-11 ASSESSMENT — PAIN SCALES - GENERAL: PAINLEVEL_OUTOF10: 3

## 2021-07-11 NOTE — CARE COORDINATION
Patricio Martin Quality Flow/Interdisciplinary Rounds Progress Note    Quality Flow Rounds held on July 11, 2021 at [address] Attending:  Bedside Nurse, ,  and Nursing Unit Leadership    Barriers to Discharge: Pt waited for son to transport her home    Anticipated Discharge Date:  Expected Discharge Date: 07/13/21    Anticipated Discharge Disposition:    Readmission Risk              Risk of Unplanned Readmission:  18           Discussed patient goal for the day, patient clinical progression, and barriers to discharge. The following Goal(s) of the Day/Commitment(s) have been identified:  Discharge - Obtain Order     Pt d/c 07/10, family had already left for the day so returned this morning for the d/c.  CM spoke with patient's son/MOUNA Rogers to determine if he has everything he needs to take patient home, he confirms that he wants to take patient home with [facility], declines SNF stating previous experience there was bad and he is committed to taking patient home. He has a hospital bed the patient uses, along with a shower chair and a walker (patient refuses the walker) and Masoud Rogers states he walks with her approx 1/2 mile two times weekly. CM will notify Miller Smiley with [facility] that patient is going home today.         Carina Dia RN  July 11, 2021

## 2021-07-11 NOTE — PLAN OF CARE
Problem: Falls - Risk of:  Goal: Will remain free from falls  Description: Will remain free from falls  Outcome: Not Met This Shift  Goal: Absence of physical injury  Description: Absence of physical injury  Outcome: Not Met This Shift     Problem: Confusion - Acute:  Goal: Absence of continued neurological deterioration signs and symptoms  Description: Absence of continued neurological deterioration signs and symptoms  Outcome: Not Met This Shift  Goal: Mental status will be restored to baseline  Description: Mental status will be restored to baseline  Outcome: Not Met This Shift     Problem: Discharge Planning:  Goal: Ability to perform activities of daily living will improve  Description: Ability to perform activities of daily living will improve  Outcome: Not Met This Shift  Goal: Participates in care planning  Description: Participates in care planning  Outcome: Not Met This Shift     Problem: Injury - Risk of, Physical Injury:  Goal: Will remain free from falls  Description: Will remain free from falls  Outcome: Not Met This Shift  Goal: Absence of physical injury  Description: Absence of physical injury  Outcome: Not Met This Shift

## 2021-07-11 NOTE — PROGRESS NOTES
PROGRESS NOTE          PATIENT NAME: Lisa Roblero RECORD NO. [id number]  DATE: 2021  SURGEON: Kemi Harvey  PRIMARY CARE PHYSICIAN: Emilee Pandey MD    HD: # 3    ASSESSMENT    Patient Active Problem List   Diagnosis    [facility] [facility]    Hypokalemia    Hypoglycemia    Community acquired pneumonia of left lower lobe of lung    Sepsis due to pneumonia ([facility]    Hypertension    Hyperlipidemia    Diabetes mellitus ([facility]    Hyperglycemia    Hypercalcemia    Pneumonia due to organism    Fall from standing    Closed fracture of superior pubic ramus ([facility])    Head injury    SDH (subdural hematoma) ([facility])       MEDICAL DECISION MAKING AND PLAN     80F, Fall w/ R frontotemporal SDH 9mm on ASA 324mg for hx CVA, baseline dementia    · Diet: Tolerating diet, carb control  · Pain: MMT, well controlled, denies pain  · UOP: Some unmeasured output  · DVT PPx: Lovenox 30 twice daily  · On Keppra 500 twice daily  · Macrobid for UTI  · Dispo: Home today with son      Chief Complaint: \" I feel pretty good\"    SUBJECTIVE    Selestine Hidden is seen and examined at the bedside this morning. No overnight events. Patient with baseline dementia, pleasantly confused. Denies any pain. Tolerating a diet. Urinating into briefs. VSS, afebrile, T-max 36.6.       OBJECTIVE  VITALS: Temp: Temp: 97 °F (36.1 °C)Temp  Av.4 °F (36.3 °C)  Min: 96.9 °F (36.1 °C)  Max: 97.8 °F (41.3 °C) BP Systolic (50MLR), RFP:673 , Min:108 , MZZ:588   Diastolic (83EFX), FQH:06, Min:65, Max:93   Pulse Pulse  Av.3  Min: 68  Max: 95 Resp Resp  Av.7  Min: 15  Max: 18 Pulse ox SpO2  Av.5 %  Min: 97 %  Max: 98 %  GENERAL: alert, cooperative, no distress  NEURO: GCS 14, A&O x0 which is baseline  HEENT: nares patent, no discharge, sclera anicteric  LUNGS:  Unlabored breathing on RA  HEART: normal rate and regular rhythm  ABDOMEN: soft, non-tender, non-distended and no guarding or peritoneal signs present  EXTREMITY: no cyanosis, clubbing or edema  SKIN: skin tears covered in protective dressing, warm, dry  I/O last 3 completed shifts: In: 25 [P.O.:25]  Out: 250 [Urine:250]    Drain/tube output: In: -   Out: 250 [Urine:250]    LAB:  CBC:   Recent Labs     07/08/21  1227 07/08/21  1630 07/09/21  0331   WBC 7.7 7.7 5.0   HGB 13.9 13.7 12.1   HCT 44.0 43.9 40.0   MCV 90.2 90.0 94.1    238 238     BMP:   Recent Labs     07/08/21  1227 07/08/21  1630 07/09/21  0331    141 140   K 4.2 4.1 3.4*    106 108*   CO2 20 24 21   BUN 22 18 12   CREATININE 0.90 0.57 0.52   GLUCOSE 326* 209* 124*     COAGS:   Recent Labs     07/08/21  1630   APTT 21.9   INR 0.9       RADIOLOGY:  No results found.         Harman Ureña DO  7/10/21, 9:57 AM

## 2021-07-12 LAB — GLUCOSE BLD-MCNC: 282 MG/DL (ref 65–105)
